# Patient Record
Sex: FEMALE | Race: WHITE | NOT HISPANIC OR LATINO | Employment: OTHER | URBAN - METROPOLITAN AREA
[De-identification: names, ages, dates, MRNs, and addresses within clinical notes are randomized per-mention and may not be internally consistent; named-entity substitution may affect disease eponyms.]

---

## 2017-08-01 ENCOUNTER — ALLSCRIPTS OFFICE VISIT (OUTPATIENT)
Dept: OTHER | Facility: OTHER | Age: 77
End: 2017-08-01

## 2017-08-01 DIAGNOSIS — I48.0 PAROXYSMAL ATRIAL FIBRILLATION (HCC): ICD-10-CM

## 2017-08-03 ENCOUNTER — HOSPITAL ENCOUNTER (OUTPATIENT)
Dept: NON INVASIVE DIAGNOSTICS | Facility: HOSPITAL | Age: 77
Discharge: HOME | End: 2017-08-03
Payer: COMMERCIAL

## 2017-08-03 DIAGNOSIS — I48.0 PAROXYSMAL ATRIAL FIBRILLATION (HCC): ICD-10-CM

## 2017-08-03 PROCEDURE — 93225 XTRNL ECG REC<48 HRS REC: CPT

## 2017-08-03 PROCEDURE — 93226 XTRNL ECG REC<48 HR SCAN A/R: CPT

## 2017-09-08 ENCOUNTER — HOSPITAL ENCOUNTER (OUTPATIENT)
Dept: RADIOLOGY | Age: 77
Discharge: HOME | End: 2017-09-08
Payer: COMMERCIAL

## 2017-09-08 DIAGNOSIS — Z12.31 ENCOUNTER FOR SCREENING MAMMOGRAM FOR MALIGNANT NEOPLASM OF BREAST: ICD-10-CM

## 2017-09-08 PROCEDURE — G0202 SCR MAMMO BI INCL CAD: HCPCS

## 2017-09-10 ENCOUNTER — GENERIC CONVERSION - ENCOUNTER (OUTPATIENT)
Dept: OTHER | Facility: OTHER | Age: 77
End: 2017-09-10

## 2017-10-24 ENCOUNTER — GENERIC CONVERSION - ENCOUNTER (OUTPATIENT)
Dept: OTHER | Facility: OTHER | Age: 77
End: 2017-10-24

## 2017-10-24 DIAGNOSIS — E03.9 HYPOTHYROIDISM: ICD-10-CM

## 2017-10-24 DIAGNOSIS — E87.6 HYPOKALEMIA: ICD-10-CM

## 2017-10-24 DIAGNOSIS — I10 ESSENTIAL (PRIMARY) HYPERTENSION: ICD-10-CM

## 2017-11-01 ENCOUNTER — LAB CONVERSION - ENCOUNTER (OUTPATIENT)
Dept: OTHER | Facility: OTHER | Age: 77
End: 2017-11-01

## 2017-11-01 LAB
A/G RATIO (HISTORICAL): 1.4 (CALC) (ref 1–2.5)
ALBUMIN SERPL BCP-MCNC: 4 G/DL (ref 3.6–5.1)
ALP SERPL-CCNC: 79 U/L (ref 33–130)
ALT SERPL W P-5'-P-CCNC: 13 U/L (ref 6–29)
AST SERPL W P-5'-P-CCNC: 19 U/L (ref 10–35)
BILIRUB SERPL-MCNC: 0.6 MG/DL (ref 0.2–1.2)
BUN SERPL-MCNC: 13 MG/DL (ref 7–25)
BUN/CREA RATIO (HISTORICAL): NORMAL (CALC) (ref 6–22)
CALCIUM SERPL-MCNC: 9.2 MG/DL (ref 8.6–10.4)
CHLORIDE SERPL-SCNC: 104 MMOL/L (ref 98–110)
CHOLEST SERPL-MCNC: 173 MG/DL
CHOLEST/HDLC SERPL: 2.7 (CALC)
CO2 SERPL-SCNC: 29 MMOL/L (ref 20–31)
CREAT SERPL-MCNC: 0.83 MG/DL (ref 0.6–0.93)
EGFR AFRICAN AMERICAN (HISTORICAL): 78 ML/MIN/1.73M2
EGFR-AMERICAN CALC (HISTORICAL): 67 ML/MIN/1.73M2
GAMMA GLOBULIN (HISTORICAL): 2.8 G/DL (CALC) (ref 1.9–3.7)
GLUCOSE (HISTORICAL): 97 MG/DL (ref 65–99)
HDLC SERPL-MCNC: 65 MG/DL
LDL CHOLESTEROL (HISTORICAL): 91 MG/DL (CALC)
NON-HDL-CHOL (CHOL-HDL) (HISTORICAL): 108 MG/DL (CALC)
POTASSIUM SERPL-SCNC: 4 MMOL/L (ref 3.5–5.3)
SODIUM SERPL-SCNC: 139 MMOL/L (ref 135–146)
TOTAL PROTEIN (HISTORICAL): 6.8 G/DL (ref 6.1–8.1)
TRIGL SERPL-MCNC: 80 MG/DL
TSH SERPL DL<=0.05 MIU/L-ACNC: 1.86 MIU/L (ref 0.4–4.5)

## 2017-11-02 ENCOUNTER — ALLSCRIPTS OFFICE VISIT (OUTPATIENT)
Dept: OTHER | Facility: OTHER | Age: 77
End: 2017-11-02

## 2017-12-08 ENCOUNTER — ALLSCRIPTS OFFICE VISIT (OUTPATIENT)
Dept: OTHER | Facility: OTHER | Age: 77
End: 2017-12-08

## 2017-12-15 NOTE — PROGRESS NOTES
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  It was explained that this will get better with time, but there are some options to try to speed up recovery  These include medrol dose pack vs neurontin  Side effects of both medications discussed  At this time, patient will just watch and wait as her symptoms have already improved  If she is interested in either of her medications, she can call our office and we'll send it to her pharmacy  Chief Complaint  1  Arm Pain    History of Present Illness  HPI: Patient is a pleasant 68-year-old female who presents here today with complaints of left forearm pain that radiates down into her hand  She states she was getting blood taken back in the beginning of November and during that time felt a sharp severe pain  Since then she continues to have discomfort primarily on the volar aspect of her forearm that radiates down into the palm and into the thumb  Patient denies numbness or tingling  She denies any weakness  The past medical history, surgical history, family history, social history, medications, allergies, and review of systems have been read and reviewed on the chart and have been updated  Review of Systems was recorded in the office today on a separate evaluation sheet and is listed below  Review of Systems   Constitutional: No fever, no chills, feels well, no tiredness, no recent weight gain or loss  Eyes: No complaints of eyesight problems, no red eyes  ENT: no loss of hearing, no nosebleeds, no sore throat  Cardiovascular: No complaints of chest pain, no palpitations, no leg claudication or lower extremity edema  Respiratory: no compliants of shortness of breath, no wheezing, no cough  Gastrointestinal: no complaints of abdominal pain, no constipation, no nausea or diarrhea, no vomiting, no bloody stools  Genitourinary: no complaints of dysuria, no incontinence  Musculoskeletal: as noted in HPI  Integumentary: no complaints of skin rash or lesion, no itching or dry skin, no skin wounds  Neurological: no complaints of headache, no confusion, no numbness or tingling, no dizziness  Endocrine: No complaints of muscle weakness, no feelings of weakness, no frequent urination, no excessive thirst   Psychiatric: No suicidal thoughts, no anxiety, no feelings of depression  Active Problems  1  Acute bronchitis (466 0) (J20 9)   2  Advance directive discussed with patient (V65 49) (Z71 89)   3  Ankle pain, unspecified laterality   4  Atrial fibrillation (427 31) (I48 91)   5  Benign essential hypertension (401 1) (I10)   6  Dizziness (780 4) (R42)   7  Dyspnea on exertion (786 09) (R06 09)   8  Encounter for routine pelvic examination (V72 31) (Z01 419)   9  Encounter for screening mammogram for malignant neoplasm of breast (V76 12) (Z12 31)   10  Ganglion (727 43) (M67 40)   11  Group B streptococcal infection (041 02) (A49 1)   12  History of food allergy (V15 05) (Z91 018)   13  Hypokalemia (276 8) (E87 6)   14  Hypothyroidism (244 9) (E03 9)   15  Jaw pain (784 92) (R68 84)   16  Medicare annual wellness visit, subsequent (V70 0) (Z00 00)   17  Meniere's disease (386 00) (H81 09)   18  Meralgia paresthetica (355 1) (G57 10)   19  Need for chickenpox vaccination (V05 4) (Z23)   20  Need for prophylactic vaccination and inoculation against influenza (V04 81) (Z23)   21  Need for vaccination with 13-polyvalent pneumococcal conjugate vaccine (V03 82) (Z23)   22  Paroxysmal atrial fibrillation (427 31) (I48 0)   23  Piriformis syndrome, unspecified laterality (355 0) (G57 00)   24   Preventive Medicine Estab Patient Checkup Adult Over 64 (V70 0)   25  Racing heart beat (785 0) (R00 0)   26  Screening for genitourinary condition (V81 6) (Z13 89)   27  Screening for neurological condition (V80 09) (Z13 89)   28  Screening for osteoporosis (V82 81) (Z13 820)   29  Thickened nails (703 8) (L60 2)   30  Vaginal Pap smear (V76 47) (Z12 72)   31  Vaginitis (616 10) (N76 0)    Past Medical History   · History of hypokalemia (V12 29) (Z86 39)   · Need for chickenpox vaccination (V05 4) (Z23)   · Need for prophylactic vaccination and inoculation against influenza (V04 81) (Z23)    Surgical History   · History of Hysterectomy   · Preventive Medicine Estab Patient Checkup Adult Over 59 (V70 0)    Family History   · Family history of Dementia   · Family history of Colon Cancer (V16 0)   · Family history of Family Health Status Of Father -    · Family history of Family Health Status Of Mother -     Social History   · Denied: History of Drug Use   · Former smoker (V15 82) (Q71 704)   · Never Drank Alcohol    Current Meds   1  Aspirin Low Dose 81 MG TABS; TAKE 1 TABLET DAILY; Therapy: 19XBK2528 to (Evaluate:05Arv4949)  Requested for: 50Qby2784 Recorded   2  Caltrate 600+D TABS; Take 1 tablet 3 x wk; Therapy: (Recorded:43Zud5224) to Recorded   3  Centrum Silver Ultra Womens Oral Tablet; TAKE 1/2 TABLET IN AM AND 1/2 IN PM; Therapy: (Recorded:85Ggs1307) to Recorded   4  Claritin 10 MG Oral Capsule; TAKE CAPSULE Daily; Therapy: (Recorded:01Nbp4602) to Recorded   5  Clobetasol Propionate 0 05 % External Cream; PRN; Therapy: 38JCD1742 to (Last Rx:2017)  Requested for: 2017 Ordered   6  Fish Oil CAPS; Take 1 cap 3 x wk; Therapy: (Recorded:16Ufe1532) to Recorded   7  Glucosamine TABS; Take 1 tablet 3 x wk; Therapy: (Recorded:57Aru2749) to Recorded   8  Levothyroxine Sodium 88 MCG Oral Tablet; take 1 tablet by mouth every day; Therapy: 97Zbc3071 to (Evaluate:72Ocw6669)  Requested for: 10Qms7200; Last Rx:53Zvi5544 Ordered   9  Metoprolol Tartrate 25 MG Oral Tablet; take 1 tablet once daily; Therapy: 03CAH4101 to (Evaluate:31Mar2018)  Requested for: 05Apr2017; Last Rx:05Apr2017 Ordered   10  Triamterene-HCTZ 37 5-25 MG Oral Capsule; TAKE CAPSULE  PRN; Therapy: (Ti Presley) to  Requested for: 78Udw8531 Recorded   11  Vitamin C 500 MG Oral Capsule; TAKE 1 CAPSULE 3 x wk; Therapy: (Recorded:52Lck0792) to Recorded   12  Vitamin D3 1000 UNIT Oral Capsule; TAKE CAPSULE 3 x wk; Therapy: (Recorded:33Tgy2879) to Recorded    Allergies  1  Advair Diskus AEPB   2  Penicillins   3  Latex Gloves MISC  4  House Dust   5  Mold   6  No Known Food Allergies    Vitals  Signs   Heart Rate: 69  Systolic: 822  Diastolic: 71  Height: 5 ft 5 in  Weight: 192 lb   BMI Calculated: 31 95  BSA Calculated: 1 94    Physical Exam     General: No acute distress, age-appropriate  Neck: Supple, trachea midline  HEENT: Normocephalic atraumatic, mucous membranes are moist, sclera are nonicteric  Cardiovascular: No discernable arrhythmia  Respiratory: Breathing is even and unlabored, no stridor or audible wheezing  Psychiatric: Awake alert and oriented x3, normal mood and affect  Abdomen: Without rebound or guarding  Left Basic: (Pt's FPL, APB and extensors intact, full strength  )  Left Carpal Tunnel: FDS Flexion Test and Normal APB Strength, but No AIN Weakness, Negative Pronator Stress Test, Negative Tinelâs, No Durkinâs Compression, Negative Phalenâs and No Thenar Atrophy  Carpal Tunnel Examination-  Left Cubital Tunnel: No Tinelâs at Elbow and No Tinelâs at wrist  Carpal Tunnel Examination-   Skin: was evaluated and demonstrated no masses, no abrasions, no erythema, no effusion, no edema, no fluctuance, no induration, no laceration, no ulceration, no lymphadenopathy  Left Upper Neurovascular: Median nerve has abnormal motor strength  Ulnar nerve has normal motor strength  Radial nerve has normal motor strength   Median nerve has abnormal sensation  Ulnar nerve had normal sensation  Radial nerve has normal sensation  Ulnar artery has a normal pulse  Radial artery has a normal pulse  Future Appointments    Date/Time Provider Specialty Site   11/09/2018 01:00 PM Jennifer Mazariegos DO Internal Medicine [facility]   03/14/2018 09:30 AM SHREE Lopez   Orthopedic Surgery [facility]     Signatures   Electronically signed by : Dionna Cuevas [facility]; Dec  8 2017 12:27PM EST                       (Author)    Electronically signed by : SHREE Calderón ; Dec 13 2017  2:05PM EST                       (Author)

## 2018-01-10 NOTE — RESULT NOTES
Message   Notify the patient normal TSH follow up as scheduled        Verified Results  (1) TSH [id number] 07:26AM Linda Number     Test Name Result Flag Reference   TSH 2 060 uIU/mL  0 358-3 740   - Patient Instructions: This is a fasting blood test  Water,black tea or black  coffee only after 9:00pm the night before test Drink 2 glasses of water the morning of test   Patients undergoing fluorescein dye angiography may retain small amounts of fluorescein in the body for 48-72 hours post procedure  Samples containing fluorescein can produce falsely depressed TSH values  If the patient had this procedure,a specimen should be resubmitted post fluorescein clearance            The recommended reference ranges for TSH during pregnancy are as follows:  First trimester 0 1 to 2 5 uIU/mL  Second trimester  0 2 to 3 0 uIU/mL  Third trimester 0 3 to 3 0 uIU/m       Signatures   Electronically signed by : Westley Jones DO; Dec 21 2016  9:42PM EST                       (Author)

## 2018-01-11 NOTE — RESULT NOTES
Message   Notify the patient the mammogram is negative, see OB/GYN for routine examination and f/u as  scheduled        Verified Results  * [facility] SCREENING BILATERAL W CAD [id number] 06:52AM Emmy Sosa Order Number: [id number]    - Patient Instructions: To schedule this appointment, please contact Central Scheduling at [phone]  Do not wear any perfume, powder, lotion or deodorant on breast or underarm area  Please bring your doctors order, referral (if needed) and insurance information with you on the day of the test  Failure to bring this information may result in this test being rescheduled  Arrive 15 minutes prior to your appointment time to register  On the day of your test, please bring any prior mammogram or breast studies with you that were not performed at a [facility]  Failure to bring prior exams may result in your test needing to be rescheduled   Order Number: [id number]    - Patient Instructions: To schedule this appointment, please contact Central Scheduling at [phone]  Do not wear any perfume, powder, lotion or deodorant on breast or underarm area  Please bring your doctors order, referral (if needed) and insurance information with you on the day of the test  Failure to bring this information may result in this test being rescheduled  Arrive 15 minutes prior to your appointment time to register  On the day of your test, please bring any prior mammogram or breast studies with you that were not performed at a [facility]  Failure to bring prior exams may result in your test needing to be rescheduled   Order Number: [id number]    - Patient Instructions: To schedule this appointment, please contact Central Scheduling at [phone]  Do not wear any perfume, powder, lotion or deodorant on breast or underarm area         Please bring your doctors order, referral (if needed) and insurance information with you on the day of the test  Failure to bring this information may result in this test being rescheduled  Arrive 15 minutes prior to your appointment time to register  On the day of your test, please bring any prior mammogram or breast studies with you that were not performed at a [facility]  Failure to bring prior exams may result in your test needing to be rescheduled  Test Name Result Flag Reference   MAMMO SCREENING BILATERAL W CAD (Report)     Patient History:   Family history of colorectal cancer in father at age 80  Patient is a former smoker, and smoked for 15 years  Patient's    BMI is 30 8  Reason for exam: screening (asymptomatic)  Mammo Screening Bilateral W CAD: September 2, 2016 - Check In #:    [de-identified]   Bilateral CC and MLO view(s) were taken  Technologist: Alinda Hamman, RT(R)(M)   Prior study comparison: August 28, 2015, digital bilateral    screening mammogram performed at [facility]  August 22, 2014, digital bilateral screening mammogram performed    at [address] [place] [address]  August 16, 2013, digital    bilateral screening mammogram performed at /[facility]  August 14, 2012, digital bilateral screening mammogram    performed at [address] [place] [address]  June 30, 2011, digital   bilateral screening mammogram performed at /[facility]  The breast tissue is almost entirely fat  No new dominant soft    tissue mass, architectural distortion or suspicious    calcifications are noted  The skin and nipple structures are    within normal limits  Benign appearing calcifications are noted  No mammographic evidence of malignancy  No    significant changes when compared with prior studies  ASSESSMENT: BiRad:2 - Benign     Recommendation:   Routine screening mammogram of both breasts in 1 year  A    reminder letter will be scheduled  Analyzed by CAD     8-10% of cancers will be missed on mammography   Management of a palpable abnormality must be based on clinical grounds  Patients   will be notified of their results via letter from our facility  Accredited by [employer] of [employer] and FDA       Transcription Location: TERRIE [address]: [id number]     Risk Value(s):   Tyrer-Cuzick 10 Year: 2 306%, Tyrer-Cuzick Lifetime: 2 306%,    Myriad Table: 1 5%, HANNAH 5 Year: 2 1%, NCI Lifetime: 3 8%   Signed by:   Patricia Davis MD   9/2/16       Signatures   Electronically signed by : Jamel Dill DO; Sep  4 2016 10:41AM EST                       (Author)

## 2018-01-12 NOTE — PROGRESS NOTES
Assessment    1  Encounter for preventive health examination (V70 0) (Z00 00)   2  Hypothyroidism (244 9) (E03 9)    Plan  Advance directive discussed with patient    · We recommend that you create an advance directive ; Status:Complete - Retrospective  By Protocol Authorization;   Done: [id number]  Health Maintenance    · (1) COMPREHENSIVE METABOLIC PANEL; Status:Active; Requested for:02Oct2018;    · (1) HEMOGLOBIN A1C; Status:Active; Requested WOO:[id number]; Health Maintenance, Hypothyroidism    · (1) LIPID PANEL, FASTING; Status:Active; Requested for:02Oct2018;   Hypothyroidism    · (Q) TSH, 3RD GENERATION; Status:Active; Requested FRT:[id number]; Assessment plan 1  Medicare annual wellness examination subsequent completed for patient overall the patient is clinically stable doing very well I have reviewed her laboratories, patient to get the flu shot in the near future patient to call me if she were to get a puncture wound for her tetanus booster  She will see colon rectal for a screening colonoscopy this year  I will be ordering the patient comprehensive metabolic panel, hemoglobin A1c, lipid panel in 1 year  Return to office 12   months  call if any problems     Chief Complaint  The patient presents today for annual Medicare wellness exam      Advance Directives  Advance Directive [facility]:   The patient is not in agreement to receive the Advance Care Planning service    NO - Patient does not have an advance health care directive  History of Present Illness  Welcome to Medicare and Wellness Visits: The patient is being seen for the subsequent annual wellness visit  Medicare Screening and Risk Factors   Hospitalizations: no previous hospitalizations  Once per lifetime medicare screening tests: ECG has not been done and AAA screening US has not yet been done  Medicare Screening Tests Risk Questions   Drug and Alcohol Use: Alcohol concern:   The patient has no concerns about alcohol abuse   She has never used illicit drugs  Diet and Physical Activity: Current diet includes well balanced meals, 2 servings of fruit per day, 3+ servings of vegetables per day, 1 servings of meat per day, 2 servings of whole grains per day, 2 servings of dairy products per day and 2 cups of coffee per day  She exercises 3 times per week  Exercise: strength training 45 minutes per week  Mood Disorder and Cognitive Impairment Screening:   Depression screening  no significant symptoms  She denies feeling down, depressed, or hopeless over the past two weeks  She denies feeling little interest or pleasure in doing things over the past two weeks  Cognitive impairment screening: denies difficulty learning/retaining new information, denies difficulty handling complex tasks, denies difficulty with reasoning, denies difficulty with spatial ability and orientation, denies difficulty with language and denies difficulty with behavior  Functional Ability/Level of Safety: Hearing is slightly decreased  She reports hearing difficulties  She uses a hearing aid  The patient is currently able to do activities of daily living without limitations, able to do instrumental activities of daily living without limitations, able to participate in social activities without limitations and able to drive without limitations  Injury History: no polypharmacy, no alcohol use, no mobility impairment, no antidepressant use, no deconditioning, no postural hypotension, no sedative use, visual impairment, urinary incontinence, no antihypertensive use, no cognitive impairment, up and go test was normal and no previous fall  Home safety risk factors:  no unfamiliar surroundings, no loose rugs, no poor household lighting, no uneven floors, no household clutter, grab bars in the bathroom and handrails on the stairs  Advance Directives: Advance directives: no living will, no durable power of  for health care directives and no advance directives  Co-Managers and Medical Equipment/Suppliers: See Patient Care Team      Patient Care Team    Care Team Member Role Specialty Office Number   Lexus Keenan HOEWLL  Cardiology , 1970 [facility]  Internal Medicine [phone]   Dr Kennedy Mayank optometrist      Christin Arroyoippo  Colon and Rectal Surgery [phone]     Review of Systems    Constitutional: negative  Cardiovascular: negative  Respiratory: negative  Gastrointestinal: negative  Genitourinary: negative, no dysuria, no urinary frequency, no urinary urgency and no flank pain  Active Problems    1  Acute bronchitis (466 0) (J20 9)   2  Ankle pain, unspecified laterality   3  Atrial fibrillation (427 31) (I48 91)   4  Benign essential hypertension (401 1) (I10)   5  Dizziness (780 4) (R42)   6  Dyspnea on exertion (786 09) (R06 09)   7  Encounter for routine pelvic examination (V72 31) (Z01 419)   8  Encounter for screening mammogram for malignant neoplasm of breast (V76 12)   (Z12 31)   9  Ganglion (727 43) (M67 40)   10  Group B streptococcal infection (041 02) (A49 1)   11  History of food allergy (V15 05) (Z91 018)   12  Hypokalemia (276 8) (E87 6)   13  Hypothyroidism (244 9) (E03 9)   14  Jaw pain (784 92) (R68 84)   15  Medicare annual wellness visit, subsequent (V70 0) (Z00 00)   16  Meniere's disease (386 00) (H81 09)   17  Meralgia paresthetica (355 1) (G57 10)   18  Need for chickenpox vaccination (V05 4) (Z23)   19  Need for prophylactic vaccination and inoculation against influenza (V04 81) (Z23)   20  Need for vaccination with 13-polyvalent pneumococcal conjugate vaccine (V03 82) (Z23)   21  Paroxysmal atrial fibrillation (427 31) (I48 0)   22  Piriformis syndrome, unspecified laterality (355 0) (G57 00)   23  Preventive Medicine Estab Patient Checkup Adult Over 64 (V70 0)   24  Racing heart beat (785 0) (R00 0)   25  Screening for genitourinary condition (V81 6) (Z13 89)   26  Screening for neurological condition (V80 09) (Z13 89)   27  Screening for osteoporosis (V82 81) (Z13 820)   28  Thickened nails (703 8) (L60 2)   29  Vaginal Pap smear (V76 47) (Z12 72)   30  Vaginitis (616 10) (N76 0)    Past Medical History    · History of hypokalemia (V12 29) (Z86 39)   · Need for chickenpox vaccination (V05 4) (Z23)   · Need for prophylactic vaccination and inoculation against influenza (V04 81) (Z23)    The active problems and past medical history were reviewed and updated today  Surgical History    · History of Hysterectomy   · Preventive Medicine Estab Patient Checkup Adult Over 59 (V70 0)    The surgical history was reviewed and updated today  Family History  Mother    · Family history of Dementia  Father    · Family history of Colon Cancer (V16 0)  Family History    · Family history of Family Health Status Of Father -    · Family history of Family Health Status Of Mother -     The family history was reviewed and updated today  Social History    · Denied: History of Drug Use   · Former smoker ( 80) (O01 317)   · Never Drank Alcohol  The social history was reviewed and updated today  The social history was reviewed and is unchanged  Current Meds   1  Aspirin Low Dose 81 MG TABS; TAKE 1 TABLET DAILY; Therapy: 93FOF8474 to (Evaluate:98Gqg0599)  Requested for: 2015 Recorded   2  Caltrate 600+D TABS; Take 1 tablet 3 x wk; Therapy: (Recorded:46Csx3346) to Recorded   3  Centrum Silver Ultra Womens Oral Tablet; TAKE 1/2 TABLET IN AM AND 1/2 IN PM;   Therapy: (Recorded:93Tfa3585) to Recorded   4  Claritin 10 MG Oral Capsule; TAKE CAPSULE Daily; Therapy: (Recorded:59Exo8276) to Recorded   5  Clobetasol Propionate 0 05 % External Cream; PRN; Therapy: 79CQG0439 to (Last Rx:2017)  Requested for: 2017 Ordered   6  Fish Oil CAPS; Take 1 cap 3 x wk; Therapy: (Recorded:26Nis5303) to Recorded   7   Glucosamine TABS; Take 1 tablet 3 x wk; Therapy: (Recorded:18May2015) to Recorded   8  Levothyroxine Sodium 88 MCG Oral Tablet; take 1 tablet by mouth every day; Therapy: 98Eyq5734 to (Evaluate:03Dfb0094)  Requested for: 02Aug2017; Last   Rx:02Aug2017 Ordered   9  Metoprolol Tartrate 25 MG Oral Tablet; take 1 tablet once daily; Therapy: 90CNZ4613 to (Evaluate:31Mar2018)  Requested for: 05Apr2017; Last   Rx:05Apr2017 Ordered   10  Triamterene-HCTZ 37 5-25 MG Oral Capsule; TAKE CAPSULE  PRN; Therapy: (Theo Money) to  Requested for: 02Aug2016 Recorded   11  Vitamin C 500 MG Oral Capsule; TAKE 1 CAPSULE 3 x wk; Therapy: (Recorded:18May2015) to Recorded   12  Vitamin D3 1000 UNIT Oral Capsule; TAKE CAPSULE 3 x wk; Therapy: (Recorded:18May2015) to Recorded    The medication list was reviewed and updated today  Allergies    1  Advair Diskus AEPB   2  Penicillins    3  House Dust   4  Mold   5  No Known Food Allergies    Immunizations   1 2 3    Influenza  26-Nov-2012 23-Sep-2015 23-Sep-2016    PCV  18-Aug-2015      PPSV  22-Jul-2003      Td/DT  22-Jul-2005      Zoster  25-Feb-2013       Vitals  Signs    Temperature: 98 F  Heart Rate: 63  Respiration: 16  Systolic: 235  Diastolic: 72  Height: 5 ft 5 in  O2 Saturation: 94    Physical Exam    Constitutional   General appearance: No acute distress, well appearing and well nourished  Head and Face   Head and face: Normal     Eyes   Conjunctiva and lids: No swelling, erythema or discharge  Pupils and irises: Equal, round, reactive to light  Ears, Nose, Mouth, and Throat   External inspection of ears and nose: Normal     Otoscopic examination: Tympanic membranes translucent with normal light reflex  Canals patent without erythema  Hearing: Normal     Nasal mucosa, septum, and turbinates: Normal without edema or erythema  Lips, teeth, and gums: Normal, good dentition  Oropharynx: Normal with no erythema, edema, exudate or lesions      Neck   Neck: Supple, symmetric, trachea midline, no masses  Pulmonary   Respiratory effort: No increased work of breathing or signs of respiratory distress  Auscultation of lungs: Clear to auscultation  Cardiovascular   Auscultation of heart: Normal rate and rhythm, normal S1 and S2, no murmurs  Pedal pulses: 2+ bilaterally  Examination of extremities for edema and/or varicosities: Normal     Abdomen   Abdomen: Non-tender, no masses  Liver and spleen: No hepatomegaly or splenomegaly  Lymphatic   Palpation of lymph nodes in neck: No lymphadenopathy  Psychiatric   Mood and affect: Normal        Results/Data  PHQ-9 Adult Depression Screening 81JLE4455 02:10PM User, MiTÃº     Test Name Result Flag Reference   PHQ-9 Adult Depression Score 3     Over the last two weeks, how often have you been bothered by any of the following problems? Little interest or pleasure in doing things: Not at all - 0  Feeling down, depressed, or hopeless: Not at all - 0  Trouble falling or staying asleep, or sleeping too much: Not at all - 0  Feeling tired or having little energy: Several days - 1  Poor appetite or over eating: More than half the days - 2  Feeling bad about yourself - or that you are a failure or have let yourself or your family down: Not at all - 0  Trouble concentrating on things, such as reading the newspaper or watching television: Not at all - 0  Moving or speaking so slowly that other people could have noticed  Or the opposite -  being so fidgety or restless that you have been moving around a lot more than usual: Not at all - 0  Thoughts that you would be better off dead, or of hurting yourself in some way: Not at all - 0   PHQ-9 Adult Depression Screening Negative     PHQ-9 Difficulty Level Not difficult at all     PHQ-9 Severity Minimal Depression         Health Management  Health Maintenance   Medicare Annual Wellness Visit; every 1 year; Last 29Sep2015; Next Due: 14BTS2926;   Overdue    Signatures   Electronically signed by : Belle Thornton DO; Nov 2 2017  3:05PM EST                       (Author)

## 2018-01-13 NOTE — RESULT NOTES
Message   notify the patient normal labs except a mild elevation of the blood sugar please have the pt  reduce carbohydrates and sweets in the diet follow up as scheduled        Verified Results  (1) COMPREHENSIVE METABOLIC PANEL [id number] 72:49CC [facility] [employer] Order Number: [id number]     Test Name Result Flag Reference   GLUCOSE,RANDM 101 mg/dL     If the patient is fasting, the ADA then defines impaired fasting glucose as > 100 mg/dL and diabetes as > or equal to 123 mg/dL  SODIUM 142 mmol/L  136-145   POTASSIUM 3 9 mmol/L  3 5-5 3   CHLORIDE 107 mmol/L  100-108   CARBON DIOXIDE 27 mmol/L  21-32   ANION GAP (CALC) 8 mmol/L  4-13   BLOOD UREA NITROGEN 13 mg/dL  5-25   CREATININE 0 70 mg/dL  0 60-1 30   Standardized to IDMS reference method   CALCIUM 9 0 mg/dL  8 3-10 1   BILI, TOTAL 0 53 mg/dL  0 20-1 00   ALK PHOSPHATAS 88 U/L     ALT (SGPT) 22 U/L  12-78   AST(SGOT) 18 U/L  5-45   ALBUMIN 3 3 g/dL L 3 5-5 0   TOTAL PROTEIN 7 1 g/dL  6 4-8 2   eGFR Non-African American      >60 0 ml/min/1 73sq m   - Patient Instructions: This is a fasting blood test  Water,black tea or black  coffee only after 9:00pm the night before test Drink 2 glasses of water the morning of test   National Kidney Disease Education Program recommendations are as follows:  GFR calculation is accurate only with a steady state creatinine  Chronic Kidney disease less than 60 ml/min/1 73 sq  meters  Kidney failure less than 15 ml/min/1 73 sq  meters  (1) LIPID PANEL, FASTING 20Lan5091 07:26AM [facility] Order Number: [id number]     Test Name Result Flag Reference   CHOLESTEROL 167 mg/dL     HDL,DIRECT 64 mg/dL H 40-60   Specimen collection should occur prior to Metamizole administration due to the potential for falsely depressed results  LDL CHOLESTEROL CALCULATED 84 mg/dL  0-100   - Patient Instructions:  This is a fasting blood test  Water,black tea or black  coffee only after 9:00pm the night before test   Drink 2 glasses of water the morning of test     - Patient Instructions: This is a fasting blood test  Water,black tea or black  coffee only after 9:00pm the night before test Drink 2 glasses of water the morning of test   Triglyceride:         Normal              <150 mg/dl       Borderline High    150-199 mg/dl       High               200-499 mg/dl       Very High          >499 mg/dl  Cholesterol:         Desirable        <200 mg/dl      Borderline High  200-239 mg/dl      High             >239 mg/dl  HDL Cholesterol:        High    >59 mg/dL      Low     <41 mg/dL  LDL CALCULATED:    This screening LDL is a calculated result  It does not have the accuracy of the Direct Measured LDL in the monitoring of patients with hyperlipidemia and/or statin therapy  Direct Measure LDL (JBN608) must be ordered separately in these patients  TRIGLYCERIDES 97 mg/dL  <=150   Specimen collection should occur prior to N-Acetylcysteine or Metamizole administration due to the potential for falsely depressed results         Signatures   Electronically signed by : Rajinder Ramires DO; Dec 20 2016  8:12PM EST                       (Author)

## 2018-01-14 VITALS
TEMPERATURE: 98 F | SYSTOLIC BLOOD PRESSURE: 108 MMHG | HEART RATE: 63 BPM | HEIGHT: 65 IN | OXYGEN SATURATION: 94 % | DIASTOLIC BLOOD PRESSURE: 72 MMHG | RESPIRATION RATE: 16 BRPM

## 2018-01-14 NOTE — RESULT NOTES
Message   Notify the patient mammogram no evidence of malignancy, see OB/GYN for routine examination and follow up as scheduled        Verified Results  * [facility] SCREENING BILATERAL W CAD [id number] 06:50AM Fabiana Rodriguez Order Number: [id number]    - Patient Instructions: To schedule this appointment, please contact Central Scheduling at [phone]  Do not wear any perfume, powder, lotion or deodorant on breast or underarm area  Please bring your doctors order, referral (if needed) and insurance information with you on the day of the test  Failure to bring this information may result in this test being rescheduled  Arrive 15 minutes prior to your appointment time to register  On the day of your test, please bring any prior mammogram or breast studies with you that were not performed at a [facility]  Failure to bring prior exams may result in your test needing to be rescheduled   Order Number: [id number]    - Patient Instructions: To schedule this appointment, please contact Central Scheduling at [phone]  Do not wear any perfume, powder, lotion or deodorant on breast or underarm area  Please bring your doctors order, referral (if needed) and insurance information with you on the day of the test  Failure to bring this information may result in this test being rescheduled  Arrive 15 minutes prior to your appointment time to register  On the day of your test, please bring any prior mammogram or breast studies with you that were not performed at a [facility]  Failure to bring prior exams may result in your test needing to be rescheduled   Order Number: [id number]    - Patient Instructions: To schedule this appointment, please contact Central Scheduling at [phone]  Do not wear any perfume, powder, lotion or deodorant on breast or underarm area         Please bring your doctors order, referral (if needed) and insurance information with you on the day of the test  Failure to bring this information may result in this test being rescheduled  Arrive 15 minutes prior to your appointment time to register  On the day of your test, please bring any prior mammogram or breast studies with you that were not performed at a [facility]  Failure to bring prior exams may result in your test needing to be rescheduled  Test Name Result Flag Reference   MAMMO SCREENING BILATERAL W CAD (Report)     Patient History:   Family history of colorectal cancer at age 80 in father  Took hormonal contraceptives for 1 month  Patient is a former smoker, and smoked for 15 years  Patient's    BMI is 30 8  Reason for exam: screening, asymptomatic  Mammo Screening Bilateral W CAD: September 8, 2017 - Check In #:    [de-identified]   Bilateral CC and MLO view(s) were taken  Technologist: RT Lisa(R)(M)   Prior study comparison: September 2, 2016, mammo screening    bilateral W CAD performed at [facility]  August 28, 2015, [facility]  August 22, 2014, digital bilateral    screening mammogram performed at [facility]  August 16, 2013, digital bilateral screening mammogram performed    at [facility]  August 14, 2012, digital    bilateral screening mammogram performed at [facility]  There are scattered fibroglandular densities  No dominant soft tissue mass, architectural distortion or    suspicious calcifications are noted in either breast   The skin    and nipple structures are within normal limits  Scattered benign   appearing calcifications are noted  No evidence of malignancy  No significant changes when compared with prior studies  ACR BI-RADSï¾® Assessments: BiRad:2 - Benign     Recommendation:   Routine screening mammogram of both breasts in 1 year     Analyzed by CAD     The patient is scheduled in a reminder system for screening    mammography  8-10% of cancers will be missed on mammography  Management of a    palpable abnormality must be based on clinical grounds  Patients   will be notified of their results via letter from our facility  Accredited by [employer] of Radiology and FDA       Transcription Location: TERRIE [address]: [id number]     Risk Value(s):   Tyrer-Cuzick 10 Year: 2 000%, Tyrer-Cuzick Lifetime: 2 000%,    Myriad Table: 1 5%, HANNAH 5 Year: 2 1%, NCI Lifetime: 3 4%   Signed by:   Ronny Adorno MD   9/8/17       Signatures   Electronically signed by : Yamilex Ascencio DO; Sep 10 2017  8:43AM EST                       (Author)

## 2018-01-15 VITALS
SYSTOLIC BLOOD PRESSURE: 122 MMHG | BODY MASS INDEX: 31.49 KG/M2 | WEIGHT: 189 LBS | DIASTOLIC BLOOD PRESSURE: 76 MMHG | HEART RATE: 66 BPM | HEIGHT: 65 IN

## 2018-01-18 ENCOUNTER — HOSPITAL ENCOUNTER (EMERGENCY)
Facility: HOSPITAL | Age: 78
Discharge: HOME | End: 2018-01-18
Attending: EMERGENCY MEDICINE | Admitting: EMERGENCY MEDICINE
Payer: COMMERCIAL

## 2018-01-18 ENCOUNTER — APPOINTMENT (EMERGENCY)
Dept: CT IMAGING | Facility: HOSPITAL | Age: 78
End: 2018-01-18
Payer: COMMERCIAL

## 2018-01-18 VITALS
OXYGEN SATURATION: 98 % | HEART RATE: 59 BPM | SYSTOLIC BLOOD PRESSURE: 150 MMHG | DIASTOLIC BLOOD PRESSURE: 69 MMHG | RESPIRATION RATE: 16 BRPM | TEMPERATURE: 98.2 F

## 2018-01-18 DIAGNOSIS — M51.36 DEGENERATIVE DISC DISEASE, LUMBAR: ICD-10-CM

## 2018-01-18 DIAGNOSIS — M54.50 LOW BACK PAIN: Primary | ICD-10-CM

## 2018-01-18 LAB
ALBUMIN SERPL BCP-MCNC: 4.1 G/DL (ref 3.5–5)
ALP SERPL-CCNC: 79 U/L (ref 46–116)
ALT SERPL W P-5'-P-CCNC: 30 U/L (ref 12–78)
ANION GAP SERPL CALCULATED.3IONS-SCNC: 8 MMOL/L (ref 4–13)
AST SERPL W P-5'-P-CCNC: 20 U/L (ref 5–45)
BACTERIA UR QL AUTO: ABNORMAL /HPF
BASOPHILS # BLD AUTO: 0.02 THOUSANDS/ΜL (ref 0–0.1)
BASOPHILS NFR BLD AUTO: 0 % (ref 0–1)
BILIRUB SERPL-MCNC: 0.4 MG/DL (ref 0.2–1)
BILIRUB UR QL STRIP: NEGATIVE
BUN SERPL-MCNC: 17 MG/DL (ref 5–25)
CALCIUM SERPL-MCNC: 9.6 MG/DL (ref 8.3–10.1)
CHLORIDE SERPL-SCNC: 104 MMOL/L (ref 100–108)
CLARITY UR: CLEAR
CO2 SERPL-SCNC: 29 MMOL/L (ref 21–32)
COLOR UR: YELLOW
CREAT SERPL-MCNC: 0.81 MG/DL (ref 0.6–1.3)
EOSINOPHIL # BLD AUTO: 0.02 THOUSAND/ΜL (ref 0–0.61)
EOSINOPHIL NFR BLD AUTO: 0 % (ref 0–6)
ERYTHROCYTE [DISTWIDTH] IN BLOOD BY AUTOMATED COUNT: 13.5 % (ref 11.6–15.1)
GFR SERPL CREATININE-BSD FRML MDRD: 69 ML/MIN/1.73SQ M
GLUCOSE SERPL-MCNC: 98 MG/DL (ref 65–140)
GLUCOSE UR STRIP-MCNC: NEGATIVE MG/DL
HCT VFR BLD AUTO: 40.6 % (ref 34.8–46.1)
HGB BLD-MCNC: 14.4 G/DL (ref 11.5–15.4)
HGB UR QL STRIP.AUTO: NEGATIVE
KETONES UR STRIP-MCNC: NEGATIVE MG/DL
LEUKOCYTE ESTERASE UR QL STRIP: ABNORMAL
LYMPHOCYTES # BLD AUTO: 2.15 THOUSANDS/ΜL (ref 0.6–4.47)
LYMPHOCYTES NFR BLD AUTO: 30 % (ref 14–44)
MCH RBC QN AUTO: 32.6 PG (ref 26.8–34.3)
MCHC RBC AUTO-ENTMCNC: 35.5 G/DL (ref 31.4–37.4)
MCV RBC AUTO: 92 FL (ref 82–98)
MONOCYTES # BLD AUTO: 0.58 THOUSAND/ΜL (ref 0.17–1.22)
MONOCYTES NFR BLD AUTO: 8 % (ref 4–12)
NEUTROPHILS # BLD AUTO: 4.42 THOUSANDS/ΜL (ref 1.85–7.62)
NEUTS SEG NFR BLD AUTO: 62 % (ref 43–75)
NITRITE UR QL STRIP: NEGATIVE
NON-SQ EPI CELLS URNS QL MICRO: ABNORMAL /HPF
PH UR STRIP.AUTO: 6.5 [PH] (ref 4.5–8)
PLATELET # BLD AUTO: 236 THOUSANDS/UL (ref 149–390)
PMV BLD AUTO: 9.9 FL (ref 8.9–12.7)
POTASSIUM SERPL-SCNC: 4.1 MMOL/L (ref 3.5–5.3)
PROT SERPL-MCNC: 7.6 G/DL (ref 6.4–8.2)
PROT UR STRIP-MCNC: NEGATIVE MG/DL
RBC # BLD AUTO: 4.42 MILLION/UL (ref 3.81–5.12)
RBC #/AREA URNS AUTO: ABNORMAL /HPF
SODIUM SERPL-SCNC: 141 MMOL/L (ref 136–145)
SP GR UR STRIP.AUTO: <=1.005 (ref 1–1.03)
UROBILINOGEN UR QL STRIP.AUTO: 0.2 E.U./DL
WBC # BLD AUTO: 7.19 THOUSAND/UL (ref 4.31–10.16)
WBC #/AREA URNS AUTO: ABNORMAL /HPF

## 2018-01-18 PROCEDURE — 96374 THER/PROPH/DIAG INJ IV PUSH: CPT

## 2018-01-18 PROCEDURE — 96376 TX/PRO/DX INJ SAME DRUG ADON: CPT

## 2018-01-18 PROCEDURE — 96375 TX/PRO/DX INJ NEW DRUG ADDON: CPT

## 2018-01-18 PROCEDURE — 36415 COLL VENOUS BLD VENIPUNCTURE: CPT | Performed by: PHYSICIAN ASSISTANT

## 2018-01-18 PROCEDURE — 99284 EMERGENCY DEPT VISIT MOD MDM: CPT

## 2018-01-18 PROCEDURE — 74177 CT ABD & PELVIS W/CONTRAST: CPT

## 2018-01-18 PROCEDURE — 85025 COMPLETE CBC W/AUTO DIFF WBC: CPT | Performed by: PHYSICIAN ASSISTANT

## 2018-01-18 PROCEDURE — 80053 COMPREHEN METABOLIC PANEL: CPT | Performed by: PHYSICIAN ASSISTANT

## 2018-01-18 PROCEDURE — 81001 URINALYSIS AUTO W/SCOPE: CPT | Performed by: PHYSICIAN ASSISTANT

## 2018-01-18 RX ORDER — ACETAMINOPHEN 325 MG/1
975 TABLET ORAL ONCE
Status: COMPLETED | OUTPATIENT
Start: 2018-01-18 | End: 2018-01-18

## 2018-01-18 RX ORDER — MORPHINE SULFATE 15 MG/1
15 TABLET ORAL EVERY 6 HOURS PRN
Qty: 15 TABLET | Refills: 0 | Status: SHIPPED | OUTPATIENT
Start: 2018-01-18 | End: 2018-08-08 | Stop reason: ALTCHOICE

## 2018-01-18 RX ORDER — MORPHINE SULFATE 2 MG/ML
2 INJECTION, SOLUTION INTRAMUSCULAR; INTRAVENOUS ONCE
Status: COMPLETED | OUTPATIENT
Start: 2018-01-18 | End: 2018-01-18

## 2018-01-18 RX ORDER — LIDOCAINE 50 MG/G
1 PATCH TOPICAL ONCE
Status: DISCONTINUED | OUTPATIENT
Start: 2018-01-18 | End: 2018-01-18 | Stop reason: HOSPADM

## 2018-01-18 RX ORDER — DIAZEPAM 5 MG/ML
2.5 INJECTION, SOLUTION INTRAMUSCULAR; INTRAVENOUS ONCE
Status: COMPLETED | OUTPATIENT
Start: 2018-01-18 | End: 2018-01-18

## 2018-01-18 RX ORDER — ONDANSETRON 2 MG/ML
4 INJECTION INTRAMUSCULAR; INTRAVENOUS ONCE
Status: COMPLETED | OUTPATIENT
Start: 2018-01-18 | End: 2018-01-18

## 2018-01-18 RX ADMIN — IOHEXOL 100 ML: 350 INJECTION, SOLUTION INTRAVENOUS at 13:30

## 2018-01-18 RX ADMIN — MORPHINE SULFATE 2 MG: 2 INJECTION, SOLUTION INTRAMUSCULAR; INTRAVENOUS at 14:09

## 2018-01-18 RX ADMIN — LIDOCAINE 1 PATCH: 50 PATCH TOPICAL at 14:41

## 2018-01-18 RX ADMIN — MORPHINE SULFATE 2 MG: 2 INJECTION, SOLUTION INTRAMUSCULAR; INTRAVENOUS at 12:34

## 2018-01-18 RX ADMIN — ACETAMINOPHEN 975 MG: 325 TABLET, FILM COATED ORAL at 14:47

## 2018-01-18 RX ADMIN — DIAZEPAM 2.5 MG: 5 INJECTION, SOLUTION INTRAMUSCULAR; INTRAVENOUS at 12:37

## 2018-01-18 RX ADMIN — ONDANSETRON 4 MG: 2 INJECTION INTRAMUSCULAR; INTRAVENOUS at 12:31

## 2018-01-18 NOTE — DISCHARGE INSTRUCTIONS
Back Pain   WHAT YOU NEED TO KNOW:   Back pain is common  It can be caused by many conditions, such as arthritis or the breakdown of spinal discs  Your risk for back pain is increased by injuries, lack of activity, or repeated bending and twisting  You may feel sore or stiff on one or both sides of your back  The pain may spread to your buttocks or thighs  DISCHARGE INSTRUCTIONS:   Medicines:   · NSAIDs  help decrease swelling and pain  This medicine is available with or without a doctor's order  NSAIDs can cause stomach bleeding or kidney problems in certain people  If you take blood thinner medicine, always ask your healthcare provider if NSAIDs are safe for you  Always read the medicine label and follow directions  · Acetaminophen  decreases pain  It is available without a doctor's order  Ask how much to take and how often to take it  Follow directions  Acetaminophen can cause liver damage if not taken correctly  · Prescription pain medicine  may be given  Ask your healthcare provider how to take this medicine safely  · Take your medicine as directed  Contact your healthcare provider if you think your medicine is not helping or if you have side effects  Tell him or her if you are allergic to any medicine  Keep a list of the medicines, vitamins, and herbs you take  Include the amounts, and when and why you take them  Bring the list or the pill bottles to follow-up visits  Carry your medicine list with you in case of an emergency  Follow up with your healthcare provider in 2 weeks, or as directed:  Write down your questions so you remember to ask them during your visits  How to manage your back pain:   · Apply ice  on your back or affected area for 15 to 20 minutes every hour or as directed  Use an ice pack, or put crushed ice in a plastic bag  Cover it with a towel  Ice helps prevent tissue damage and decreases pain      · Apply heat  on your back or affected area for 20 to 30 minutes every 2 hours for as many days as directed  Heat helps decrease pain and muscle spasms  · Stay active  as much as you can without causing more pain  Bed rest could make your back pain worse  Avoid heavy lifting until your pain is gone  Return to the emergency department if:   · You have pain, numbness, or weakness in one or both legs  · Your pain becomes so severe that you cannot walk  · You cannot control your urine or bowel movements  · You have severe back pain with chest pain  · You have severe back pain, nausea, and vomiting  · You have severe back pain that spreads to your side or genital area  Contact your healthcare provider if:   · You have back pain that does not get better with rest and pain medicine  · You have a fever  · You have pain that worsens when you are on your back or when you rest     · You have pain that worsens when you cough or sneeze  · You lose weight without trying  · You have questions or concerns about your condition or care  © 2017 [facility] Information is for End User's use only and may not be sold, redistributed or otherwise used for commercial purposes  All illustrations and images included in CareNotes® are the copyrighted property of [employer]  or Dinesh Meneses  The above information is an  only  It is not intended as medical advice for individual conditions or treatments  Talk to your doctor, nurse or pharmacist before following any medical regimen to see if it is safe and effective for you

## 2018-01-18 NOTE — ED PROVIDER NOTES
History  Chief Complaint   Patient presents with    Back Pain     c/o bilateral lower back pain/spasms x 1 5 weeks  Pt denies recent trauma, urinary symptoms, or N/V/D at present time  69yo female who presents to ER for evaluation of lower back pain  States over the past week she has had mild pain come and go however last night it significantly worsened  She describes pain as a muscle spasm  Located more on the left side of her back  Denies urinary symptoms  Denies bowel or bladder dysfunction or saddle anesthesia  Pain does not radiate down the legs  She denies chest pain or shortness of breath  Sometimes the spasm will radiate into her abdomen  Patient having great difficulty sleeping last night secondary to pain  Able to walk with difficulty  Pain is worse with any movement at all  Improved with lying still  Patient has heating pas applied with minimal relief  This is the first time she is having this evaluated at the doctor  History provided by:  Patient  Back Pain   Associated symptoms: no chest pain and no fever        None       History reviewed  No pertinent past medical history  Past Surgical History:   Procedure Laterality Date    HYSTERECTOMY         History reviewed  No pertinent family history  I have reviewed and agree with the history as documented  Social History   Substance Use Topics    Smoking status: Never Smoker    Smokeless tobacco: Never Used    Alcohol use No        Review of Systems   Constitutional: Negative for chills and fever  HENT: Negative for congestion  Respiratory: Negative for cough and shortness of breath  Cardiovascular: Negative for chest pain  Gastrointestinal: Negative for diarrhea and vomiting  Musculoskeletal: Positive for back pain  Negative for joint swelling  Skin: Negative for rash and wound  Neurological: Negative for syncope         Physical Exam  ED Triage Vitals [01/18/18 1125]   Temperature Pulse Respirations Blood Pressure SpO2   98 2 °F (36 8 °C) 81 18 (!) 174/80 97 %      Temp Source Heart Rate Source Patient Position - Orthostatic VS BP Location FiO2 (%)   Oral Monitor Standing Left arm --      Pain Score       Worst Possible Pain           Orthostatic Vital Signs  Vitals:    01/18/18 1125 01/18/18 1245 01/18/18 1345   BP: (!) 174/80  146/62   Pulse: 81 (!) 52 (!) 54   Patient Position - Orthostatic VS: Standing  Lying       Physical Exam   Constitutional: She appears well-developed and well-nourished  Eyes: Conjunctivae are normal    Neck: Neck supple  Cardiovascular: Normal rate, regular rhythm and normal heart sounds  Pulmonary/Chest: Effort normal and breath sounds normal    Abdominal: Soft  Bowel sounds are normal  She exhibits no distension  There is no tenderness  There is no CVA tenderness  Musculoskeletal: She exhibits no edema  Cervical back: Normal         Thoracic back: Normal         Lumbar back: She exhibits pain  She exhibits no bony tenderness, no swelling and no deformity  Back:    Negative straight leg raise  No foot drop  Neurological: She is alert  Skin: Skin is warm and dry  No rash noted  Psychiatric: She has a normal mood and affect  Nursing note and vitals reviewed        ED Medications  Medications   lidocaine (LIDODERM) 5 % patch 1 patch (1 patch Transdermal Medication Applied 1/18/18 1441)   morphine injection 2 mg (2 mg Intravenous Given 1/18/18 1234)   ondansetron (ZOFRAN) injection 4 mg (4 mg Intravenous Given 1/18/18 1231)   diazepam (VALIUM) injection 2 5 mg (2 5 mg Intravenous Given 1/18/18 1237)   iohexol (OMNIPAQUE) 350 MG/ML injection (MULTI-DOSE) 100 mL (100 mL Intravenous Given 1/18/18 1330)   morphine injection 2 mg (2 mg Intravenous Given 1/18/18 1409)   acetaminophen (TYLENOL) tablet 975 mg (975 mg Oral Given 1/18/18 1447)       Diagnostic Studies  Results Reviewed     Procedure Component Value Units Date/Time    Urine Microscopic [[id number]] (Abnormal) Collected:  01/18/18 1446    Lab Status:  Final result Specimen:  Urine from Urine, Clean Catch Updated:  01/18/18 1541     RBC, UA None Seen /hpf      WBC, UA 0-1 (A) /hpf      Epithelial Cells None Seen /hpf      Bacteria, UA None Seen /hpf     UA w Reflex to Microscopic w Reflex to Culture [[id number]]  (Abnormal) Collected:  01/18/18 1446    Lab Status:  Final result Specimen:  Urine from Urine, Clean Catch Updated:  01/18/18 1455     Color, UA Yellow     Clarity, UA Clear     Specific Gravity, UA <=1 005     pH, UA 6 5     Leukocytes, UA Trace (A)     Nitrite, UA Negative     Protein, UA Negative mg/dl      Glucose, UA Negative mg/dl      Ketones, UA Negative mg/dl      Urobilinogen, UA 0 2 E U /dl      Bilirubin, UA Negative     Blood, UA Negative    Comprehensive metabolic panel [[id number]] Collected:  01/18/18 1230    Lab Status:  Final result Specimen:  Blood from Arm, Right Updated:  01/18/18 1303     Sodium 141 mmol/L      Potassium 4 1 mmol/L      Chloride 104 mmol/L      CO2 29 mmol/L      Anion Gap 8 mmol/L      BUN 17 mg/dL      Creatinine 0 81 mg/dL      Glucose 98 mg/dL      Calcium 9 6 mg/dL      AST 20 U/L      ALT 30 U/L      Alkaline Phosphatase 79 U/L      Total Protein 7 6 g/dL      Albumin 4 1 g/dL      Total Bilirubin 0 40 mg/dL      eGFR 69 ml/min/1 73sq m     Narrative:         National Kidney Disease Education Program recommendations are as follows:  GFR calculation is accurate only with a steady state creatinine  Chronic Kidney disease less than 60 ml/min/1 73 sq  meters  Kidney failure less than 15 ml/min/1 73 sq  meters      CBC and differential [[id number]]  (Normal) Collected:  01/18/18 1230    Lab Status:  Final result Specimen:  Blood from Arm, Right Updated:  01/18/18 1245     WBC 7 19 Thousand/uL      RBC 4 42 Million/uL      Hemoglobin 14 4 g/dL      Hematocrit 40 6 %      MCV 92 fL      MCH 32 6 pg      MCHC 35 5 g/dL      RDW 13 5 %      MPV 9 9 fL      Platelets 313 Thousands/uL      Neutrophils Relative 62 %      Lymphocytes Relative 30 %      Monocytes Relative 8 %      Eosinophils Relative 0 %      Basophils Relative 0 %      Neutrophils Absolute 4 42 Thousands/µL      Lymphocytes Absolute 2 15 Thousands/µL      Monocytes Absolute 0 58 Thousand/µL      Eosinophils Absolute 0 02 Thousand/µL      Basophils Absolute 0 02 Thousands/µL                  CT recon only lumbar spine   Final Result by Bill Flynn MD (01/18 1411)      Degenerative changes as described  Workstation performed: [id number]         CT abdomen pelvis with contrast   Final Result by Bill Flynn MD (01/18 1405)      No acute intra-abdominal abnormality  Workstation performed: [id number]                    Procedures  Procedures       Phone Contacts  ED Phone Contact    ED Course  ED Course as of Jan 18 1602   Thu Jan 18, 2018   1351 Pain not improved much  MDM  Number of Diagnoses or Management Options  Degenerative disc disease, lumbar:   Low back pain: new and requires workup     Amount and/or Complexity of Data Reviewed  Clinical lab tests: ordered and reviewed  Tests in the radiology section of CPT®: ordered and reviewed    Risk of Complications, Morbidity, and/or Mortality  General comments: Differential diagnosis includes but is not limited to: muslce spasm, DDD, compression fracture, AAA, pyleo, uti, shingles    Patient Progress  Patient progress: improved (Patient able to stand without assistance,  Ambulates slowly and steady      Discussed continuation of supportive therapy at home with tylenol, heating pad and or salonpas patches)    CritCare Time    Disposition  Final diagnoses:   Low back pain   Degenerative disc disease, lumbar     Time reflects when diagnosis was documented in both MDM as applicable and the Disposition within this note     Time User Action Codes Description Comment    1/18/2018  3:59 PM Britany ROSADO Add [M54 5] Low back pain 1/18/2018  4:02 PM Zuhair Liesarah Add [M51 36] Degenerative disc disease, lumbar       ED Disposition     ED Disposition Condition Comment    Discharge  José Roman discharge to home/self care  Condition at discharge: Good        Follow-up Information     Follow up With Specialties Details Why Contact Info Additional [address] Emergency Department Emergency Medicine  If symptoms worsen [address]  [phone] [facility] ED, [address], [place], [place], [address] [facility] and [address] [facility] Radiology In 3 days  [place] [address] [facility]  [phone]  AN [address] [facility], [facility] [address], [place], [place], [address] Please report to Outpatient registration on the 1st floor of the Medical Office building (middle building) to register  Patient's Medications   Discharge Prescriptions    MORPHINE (MSIR) 15 MG TABLET    Take 1 tablet by mouth every 6 (six) hours as needed for severe pain for up to 15 doses Max Daily Amount: 60 mg       Start Date: 1/18/2018 End Date: --       Order Dose: 15 mg       Quantity: 15 tablet    Refills: 0     No discharge procedures on file      ED Provider  Electronically Signed by           Faraz Wilson PA-C  01/18/18 6119

## 2018-01-22 VITALS
WEIGHT: 192 LBS | HEART RATE: 69 BPM | DIASTOLIC BLOOD PRESSURE: 71 MMHG | HEIGHT: 65 IN | BODY MASS INDEX: 31.99 KG/M2 | SYSTOLIC BLOOD PRESSURE: 131 MMHG

## 2018-01-23 NOTE — CONSULTS
Chief Complaint    1  Arm Pain    History of Present Illness  HPI: Patient is a pleasant 66-year-old female who presents here today with complaints of left forearm pain that radiates down into her hand  She states she was getting blood taken back in the beginning of November and during that time felt a sharp severe pain  Since then she continues to have discomfort primarily on the volar aspect of her forearm that radiates down into the palm and into the thumb  Patient denies numbness or tingling  She denies any weakness  The past medical history, surgical history, family history, social history, medications, allergies, and review of systems have been read and reviewed on the chart and have been updated  Review of Systems was recorded in the office today on a separate evaluation sheet and is listed below  Review of Systems  Focused-Female Orthopedics:   Constitutional: No fever, no chills, feels well, no tiredness, no recent weight gain or loss  Eyes: No complaints of eyesight problems, no red eyes  ENT: no loss of hearing, no nosebleeds, no sore throat  Cardiovascular: No complaints of chest pain, no palpitations, no leg claudication or lower extremity edema  Respiratory: no compliants of shortness of breath, no wheezing, no cough  Gastrointestinal: no complaints of abdominal pain, no constipation, no nausea or diarrhea, no vomiting, no bloody stools  Genitourinary: no complaints of dysuria, no incontinence  Musculoskeletal: as noted in HPI  Integumentary: no complaints of skin rash or lesion, no itching or dry skin, no skin wounds  Neurological: no complaints of headache, no confusion, no numbness or tingling, no dizziness  Endocrine: No complaints of muscle weakness, no feelings of weakness, no frequent urination, no excessive thirst    Psychiatric: No suicidal thoughts, no anxiety, no feelings of depression  Active Problems    1  Acute bronchitis (466 0) (J20 9)   2   Advance directive discussed with patient (V65 49) (Z71 89)   3  Ankle pain, unspecified laterality   4  Atrial fibrillation (427 31) (I48 91)   5  Benign essential hypertension (401 1) (I10)   6  Dizziness (780 4) (R42)   7  Dyspnea on exertion (786 09) (R06 09)   8  Encounter for routine pelvic examination (V72 31) (Z01 419)   9  Encounter for screening mammogram for malignant neoplasm of breast (V76 12)   (Z12 31)   10  Ganglion (727 43) (M67 40)   11  Group B streptococcal infection (041 02) (A49 1)   12  History of food allergy (V15 05) (Z91 018)   13  Hypokalemia (276 8) (E87 6)   14  Hypothyroidism (244 9) (E03 9)   15  Jaw pain (784 92) (R68 84)   16  Medicare annual wellness visit, subsequent (V70 0) (Z00 00)   17  Meniere's disease (386 00) (H81 09)   18  Meralgia paresthetica (355 1) (G57 10)   19  Need for chickenpox vaccination (V05 4) (Z23)   20  Need for prophylactic vaccination and inoculation against influenza (V04 81) (Z23)   21  Need for vaccination with 13-polyvalent pneumococcal conjugate vaccine (V03 82) (Z23)   22  Paroxysmal atrial fibrillation (427 31) (I48 0)   23  Piriformis syndrome, unspecified laterality (355 0) (G57 00)   24  Preventive Medicine Estab Patient Checkup Adult Over 64 (V70 0)   25  Racing heart beat (785 0) (R00 0)   26  Screening for genitourinary condition (V81 6) (Z13 89)   27  Screening for neurological condition (V80 09) (Z13 89)   28  Screening for osteoporosis (V82 81) (Z13 820)   29  Thickened nails (703 8) (L60 2)   30  Vaginal Pap smear (V76 47) (Z12 72)   31  Vaginitis (616 10) (N76 0)    Past Medical History    1  History of hypokalemia (V12 29) (Z86 39)   2  Need for chickenpox vaccination (V05 4) (Z23)   3  Need for prophylactic vaccination and inoculation against influenza (V04 81) (Z23)    Surgical History    1  History of Hysterectomy   2  Preventive Medicine Estab Patient Checkup Adult Over 59 (V70 0)    Family History    1  Family history of Dementia    2   Family history of Colon Cancer (V16 0)    3  Family history of Family Health Status Of Father -    4  Family history of Family Health Status Of Mother -     Social History    · Denied: History of Drug Use   · Former smoker (F89 35) (Z54 847)   · Never Drank Alcohol    Current Meds   1  Aspirin Low Dose 81 MG TABS; TAKE 1 TABLET DAILY; Therapy: 02NWG5862 to (Evaluate:60Ury3031)  Requested for: 61Zhw6425 Recorded   2  Caltrate 600+D TABS; Take 1 tablet 3 x wk; Therapy: (Recorded:57Dho4361) to Recorded   3  Centrum Silver Ultra Womens Oral Tablet; TAKE 1/2 TABLET IN AM AND 1/2 IN PM;   Therapy: (Recorded:59Qsh8767) to Recorded   4  Claritin 10 MG Oral Capsule; TAKE CAPSULE Daily; Therapy: (Recorded:86Jsr5697) to Recorded   5  Clobetasol Propionate 0 05 % External Cream; PRN; Therapy: 83ZQO3288 to (Last Rx:2017)  Requested for: 19Rjo3940 Ordered   6  Fish Oil CAPS; Take 1 cap 3 x wk; Therapy: (Recorded:02Afs4776) to Recorded   7  Glucosamine TABS; Take 1 tablet 3 x wk; Therapy: (Recorded:43Bxq0465) to Recorded   8  Levothyroxine Sodium 88 MCG Oral Tablet; take 1 tablet by mouth every day; Therapy: 75Csh1022 to (Evaluate:34Frx1878)  Requested for: 05Smc0732; Last   Rx:10Nvb6251 Ordered   9  Metoprolol Tartrate 25 MG Oral Tablet; take 1 tablet once daily; Therapy: 78QEG3160 to (Evaluate:2018)  Requested for: 2017; Last   Rx:2017 Ordered   10  Triamterene-HCTZ 37 5-25 MG Oral Capsule; TAKE CAPSULE  PRN; Therapy: (Mikkiita Mustlobo) to  Requested for: 17Fkt6445 Recorded   11  Vitamin C 500 MG Oral Capsule; TAKE 1 CAPSULE 3 x wk; Therapy: (Recorded:34Ded6005) to Recorded   12  Vitamin D3 1000 UNIT Oral Capsule; TAKE CAPSULE 3 x wk; Therapy: (Recorded:06Jsf7566) to Recorded    Allergies    1  Advair Diskus AEPB   2  Penicillins   3  Latex Gloves MISC    4  House Dust   5  Mold   6   No Known Food Allergies    Vitals  Signs   Recorded: 07APU0770 11:57AM   Heart Rate: 69  Systolic: 707  Diastolic: 71  Height: 5 ft 5 in  Weight: 192 lb   BMI Calculated: 31 95  BSA Calculated: 1 94    Physical Exam      General: No acute distress, age-appropriate  Neck: Supple, trachea midline  HEENT: Normocephalic atraumatic, mucous membranes are moist, sclera are nonicteric  Cardiovascular: No discernable arrhythmia  Respiratory: Breathing is even and unlabored, no stridor or audible wheezing  Psychiatric: Awake alert and oriented x3, normal mood and affect  Abdomen: Without rebound or guarding  Left Basic: (Pt's FPL, APB and extensors intact, full strength  )   Left Carpal Tunnel: FDS Flexion Test and Normal APB Strength, but No AIN Weakness, Negative Pronator Stress Test, Negative Tinel's, No Chantelle's Compression, Negative Phalen's and No Thenar Atrophy   Carpal Tunnel Examination-   Left Cubital Tunnel: No Tinel's at Elbow and No Tinel's at wrist   Carpal Tunnel Examination-    Skin: was evaluated and demonstrated no masses, no abrasions, no erythema, no effusion, no edema, no fluctuance, no induration, no laceration, no ulceration, no lymphadenopathy  Left Upper Neurovascular: Median nerve has abnormal motor strength  Ulnar nerve has normal motor strength  Radial nerve has normal motor strength  Median nerve has abnormal sensation  Ulnar nerve had normal sensation  Radial nerve has normal sensation  Ulnar artery has a normal pulse  Radial artery has a normal pulse  Assessment    1  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)    Plan  Left median nerve neuropathy    1  Continue with our present treatment plan ; Status:Complete;   Done: 30YTL4788   2  Follow-up visit in 3 months Evaluation and Treatment  Follow-up  Status: Hold For -   Scheduling  Requested for: 91HPM7258    Discussion/Summary  Discussion Summary:   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   It was explained that this will get better with time, but there are some options to try to speed up recovery  These include medrol dose pack vs neurontin  Side effects of both medications discussed  At this time, patient will just watch and wait as her symptoms have already improved  If she is interested in either of her medications, she can call our office and we'll send it to her pharmacy        Future Appointments    Signatures   Electronically signed by : Ambar Suarez, [facility]; Dec  8 2017 12:27PM EST                       (Author)    Electronically signed by : SHREE Quigley ; Dec 13 2017  2:05PM EST                       (Author)

## 2018-01-23 NOTE — CONSULTS
Chief Complaint    1  Arm Pain    History of Present Illness  HPI: Patient is a pleasant 26-year-old female who presents here today with complaints of left forearm pain that radiates down into her hand  She states she was getting blood taken back in the beginning of November and during that time felt a sharp severe pain  Since then she continues to have discomfort primarily on the volar aspect of her forearm that radiates down into the palm and into the thumb  Patient denies numbness or tingling  She denies any weakness  The past medical history, surgical history, family history, social history, medications, allergies, and review of systems have been read and reviewed on the chart and have been updated  Review of Systems was recorded in the office today on a separate evaluation sheet and is listed below  Review of Systems    Constitutional: No fever, no chills, feels well, no tiredness, no recent weight gain or loss  Eyes: No complaints of eyesight problems, no red eyes  ENT: no loss of hearing, no nosebleeds, no sore throat  Cardiovascular: No complaints of chest pain, no palpitations, no leg claudication or lower extremity edema  Respiratory: no compliants of shortness of breath, no wheezing, no cough  Gastrointestinal: no complaints of abdominal pain, no constipation, no nausea or diarrhea, no vomiting, no bloody stools  Genitourinary: no complaints of dysuria, no incontinence  Musculoskeletal: as noted in HPI  Integumentary: no complaints of skin rash or lesion, no itching or dry skin, no skin wounds  Neurological: no complaints of headache, no confusion, no numbness or tingling, no dizziness  Endocrine: No complaints of muscle weakness, no feelings of weakness, no frequent urination, no excessive thirst    Psychiatric: No suicidal thoughts, no anxiety, no feelings of depression  Active Problems    1  Acute bronchitis (466 0) (J20 9)   2   Advance directive discussed with patient (V65 49) (Z71 89)   3  Ankle pain, unspecified laterality   4  Atrial fibrillation (427 31) (I48 91)   5  Benign essential hypertension (401 1) (I10)   6  Dizziness (780 4) (R42)   7  Dyspnea on exertion (786 09) (R06 09)   8  Encounter for routine pelvic examination (V72 31) (Z01 419)   9  Encounter for screening mammogram for malignant neoplasm of breast (V76 12)   (Z12 31)   10  Ganglion (727 43) (M67 40)   11  Group B streptococcal infection (041 02) (A49 1)   12  History of food allergy (V15 05) (Z91 018)   13  Hypokalemia (276 8) (E87 6)   14  Hypothyroidism (244 9) (E03 9)   15  Jaw pain (784 92) (R68 84)   16  Medicare annual wellness visit, subsequent (V70 0) (Z00 00)   17  Meniere's disease (386 00) (H81 09)   18  Meralgia paresthetica (355 1) (G57 10)   19  Need for chickenpox vaccination (V05 4) (Z23)   20  Need for prophylactic vaccination and inoculation against influenza (V04 81) (Z23)   21  Need for vaccination with 13-polyvalent pneumococcal conjugate vaccine (V03 82) (Z23)   22  Paroxysmal atrial fibrillation (427 31) (I48 0)   23  Piriformis syndrome, unspecified laterality (355 0) (G57 00)   24  Preventive Medicine Estab Patient Checkup Adult Over 64 (V70 0)   25  Racing heart beat (785 0) (R00 0)   26  Screening for genitourinary condition (V81 6) (Z13 89)   27  Screening for neurological condition (V80 09) (Z13 89)   28  Screening for osteoporosis (V82 81) (Z13 820)   29  Thickened nails (703 8) (L60 2)   30  Vaginal Pap smear (V76 47) (Z12 72)   31   Vaginitis (616 10) (N76 0)    Past Medical History    · History of hypokalemia (V12 29) (Z86 39)   · Need for chickenpox vaccination (V05 4) (Z23)   · Need for prophylactic vaccination and inoculation against influenza (V04 81) (Z23)    Surgical History    · History of Hysterectomy   · Preventive Medicine Estab Patient Checkup Adult Over 59 (V70 0)    Family History    · Family history of Dementia    · Family history of Colon Cancer (V16 0)    · Family history of Family Health Status Of Father -    · Family history of Family Health Status Of Mother -     Social History    · Denied: History of Drug Use   · Former smoker (V08 10) (L70 372)   · Never Drank Alcohol    Current Meds   1  Aspirin Low Dose 81 MG TABS; TAKE 1 TABLET DAILY; Therapy: 39CDB8452 to (Evaluate:94Ysh6273)  Requested for: 42Tws6668 Recorded   2  Caltrate 600+D TABS; Take 1 tablet 3 x wk; Therapy: (Recorded:31Ijq7675) to Recorded   3  Centrum Silver Ultra Womens Oral Tablet; TAKE 1/2 TABLET IN AM AND 1/2 IN PM;   Therapy: (Recorded:93Xwn7711) to Recorded   4  Claritin 10 MG Oral Capsule; TAKE CAPSULE Daily; Therapy: (Recorded:90Uhn0942) to Recorded   5  Clobetasol Propionate 0 05 % External Cream; PRN; Therapy: 60EYX8499 to (Last Rx:2017)  Requested for: 07Dav7415 Ordered   6  Fish Oil CAPS; Take 1 cap 3 x wk; Therapy: (Recorded:68Ejf8528) to Recorded   7  Glucosamine TABS; Take 1 tablet 3 x wk; Therapy: (Recorded:15Ccz2042) to Recorded   8  Levothyroxine Sodium 88 MCG Oral Tablet; take 1 tablet by mouth every day; Therapy: 68Mzu7854 to (Evaluate:16Mep6931)  Requested for: 97Xdu5613; Last   Rx:75Pbe2837 Ordered   9  Metoprolol Tartrate 25 MG Oral Tablet; take 1 tablet once daily; Therapy: 56QSF5370 to (Evaluate:2018)  Requested for: 2017; Last   Rx:47Ilf0569 Ordered   10  Triamterene-HCTZ 37 5-25 MG Oral Capsule; TAKE CAPSULE  PRN; Therapy: (Jani Rojas) to  Requested for: 95Wxw9565 Recorded   11  Vitamin C 500 MG Oral Capsule; TAKE 1 CAPSULE 3 x wk; Therapy: (Recorded:25Gjm6940) to Recorded   12  Vitamin D3 1000 UNIT Oral Capsule; TAKE CAPSULE 3 x wk; Therapy: (Recorded:79Faw4220) to Recorded    Allergies    1  Advair Diskus AEPB   2  Penicillins   3  Latex Gloves MISC    4  House Dust   5  Mold   6   No Known Food Allergies    Vitals  Signs    Heart Rate: 48KTBYKNGE: 101YLGGQEMGC: 60SXEEYA: 5 ft 5 inWeight: 192 lb BMI Calculated: 31 95BSA Calculated: 1 94    Physical Exam      General: No acute distress, age-appropriate  Neck: Supple, trachea midline  HEENT: Normocephalic atraumatic, mucous membranes are moist, sclera are nonicteric  Cardiovascular: No discernable arrhythmia  Respiratory: Breathing is even and unlabored, no stridor or audible wheezing  Psychiatric: Awake alert and oriented x3, normal mood and affect  Abdomen: Without rebound or guarding  Left Basic: (Pt's FPL, APB and extensors intact, full strength  )   Left Carpal Tunnel: FDS Flexion Test and Normal APB Strength, but No AIN Weakness, Negative Pronator Stress Test, Negative Tinel's, No Chantelle's Compression, Negative Phalen's and No Thenar Atrophy   Carpal Tunnel Examination-   Left Cubital Tunnel: No Tinel's at Elbow and No Tinel's at wrist   Carpal Tunnel Examination-    Skin: was evaluated and demonstrated no masses, no abrasions, no erythema, no effusion, no edema, no fluctuance, no induration, no laceration, no ulceration, no lymphadenopathy  Left Upper Neurovascular: Median nerve has abnormal motor strength  Ulnar nerve has normal motor strength  Radial nerve has normal motor strength  Median nerve has abnormal sensation  Ulnar nerve had normal sensation  Radial nerve has normal sensation  Ulnar artery has a normal pulse  Radial artery has a normal pulse  Assessment    1  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)    Plan     Left median nerve neuropathy    · Continue with our present treatment plan ; Status:Complete;   Done: 99XQL2565   · Follow-up visit in 3 months Evaluation and Treatment  Follow-up  Status: Hold For -  Scheduling  Requested for: 36MKD5239    Discussion/Summary    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  It was explained that this will get better with time, but there are some options to try to speed up recovery  These include medrol dose pack vs neurontin   Side effects of both medications discussed  At this time, patient will just watch and wait as her symptoms have already improved  If she is interested in either of her medications, she can call our office and we'll send it to her pharmacy        Signatures   Electronically signed by : Angeles Goodman, [facility]; Dec  8 2017 12:27PM EST                       (Author)    Electronically signed by : SHREE Anne ; Dec 13 2017  2:05PM EST                       (Author)

## 2018-01-30 ENCOUNTER — OFFICE VISIT (OUTPATIENT)
Dept: INTERNAL MEDICINE CLINIC | Facility: CLINIC | Age: 78
End: 2018-01-30
Payer: COMMERCIAL

## 2018-01-30 VITALS
HEIGHT: 65 IN | HEART RATE: 62 BPM | RESPIRATION RATE: 16 BRPM | OXYGEN SATURATION: 98 % | SYSTOLIC BLOOD PRESSURE: 124 MMHG | BODY MASS INDEX: 31.39 KG/M2 | WEIGHT: 188.4 LBS | DIASTOLIC BLOOD PRESSURE: 74 MMHG

## 2018-01-30 DIAGNOSIS — M54.50 LOW BACK PAIN WITHOUT SCIATICA, UNSPECIFIED BACK PAIN LATERALITY, UNSPECIFIED CHRONICITY: Primary | ICD-10-CM

## 2018-01-30 PROCEDURE — 99213 OFFICE O/P EST LOW 20 MIN: CPT | Performed by: INTERNAL MEDICINE

## 2018-01-30 RX ORDER — CYCLOBENZAPRINE HCL 5 MG
10 TABLET ORAL 3 TIMES DAILY PRN
Qty: 30 TABLET | Refills: 0 | Status: SHIPPED | OUTPATIENT
Start: 2018-01-30 | End: 2018-01-30 | Stop reason: SDUPTHER

## 2018-01-30 RX ORDER — MULTIVIT-MIN/IRON/FOLIC ACID/K 18-600-40
CAPSULE ORAL
COMMUNITY

## 2018-01-30 RX ORDER — LEVOTHYROXINE SODIUM 88 UG/1
1 TABLET ORAL DAILY
COMMUNITY
Start: 2015-04-25 | End: 2018-08-08 | Stop reason: SDUPTHER

## 2018-01-30 RX ORDER — METHYLPREDNISOLONE 4 MG/1
TABLET ORAL
COMMUNITY
Start: 2018-01-15 | End: 2018-08-08 | Stop reason: ALTCHOICE

## 2018-01-30 RX ORDER — CYCLOBENZAPRINE HCL 5 MG
5 TABLET ORAL
Qty: 5 TABLET | Refills: 0 | Status: SHIPPED | OUTPATIENT
Start: 2018-01-30 | End: 2020-10-07

## 2018-01-30 RX ORDER — UBIQUINOL 100 MG
CAPSULE ORAL
COMMUNITY

## 2018-01-30 RX ORDER — LORATADINE 10 MG/1
CAPSULE, LIQUID FILLED ORAL DAILY
COMMUNITY

## 2018-01-30 RX ORDER — CHOLECALCIFEROL (VITAMIN D3) 25 MCG
CAPSULE ORAL
COMMUNITY

## 2018-01-30 RX ORDER — MULTIVIT,IRON,MINERALS/LUTEIN
TABLET ORAL
COMMUNITY

## 2018-01-30 RX ORDER — CLOBETASOL PROPIONATE 0.5 MG/G
CREAM TOPICAL
COMMUNITY
Start: 2014-08-19 | End: 2019-04-03 | Stop reason: ALTCHOICE

## 2018-01-30 RX ORDER — TRIAMTERENE AND HYDROCHLOROTHIAZIDE 37.5; 25 MG/1; MG/1
CAPSULE ORAL AS NEEDED
COMMUNITY
End: 2018-08-02 | Stop reason: SDUPTHER

## 2018-01-30 NOTE — PROGRESS NOTES
Assessment/Plan:    No problem-specific Assessment & Plan notes found for this encounter  Problem List Items Addressed This Visit     None      Visit Diagnoses     Low back pain without sciatica, unspecified back pain laterality, unspecified chronicity    -  Primary    Relevant Medications    cyclobenzaprine (FLEXERIL) 5 mg tablet    Other Relevant Orders    Ambulatory referral to Pain Management    Ambulatory referral to Physical Therapy          Assessment and plan 1  Acute lower back pain I have reviewed the CT scan completed in the ER and the ER report; it does show multiple levels of degenerative disease, her symptoms are compatible muscle spasm I will start physical therapy, I have referred the patient to pain management the patient did request a muscle relaxant Flexeril 5 mg 1 p o  q h s  p r n  we have reviewed the risks benefits and side effects of the medication no on no driving after taking medication patient should check with her cardiologist prior to starting this medication (flexeril) (the patient will check with Cardiology if she can try Aleve as directed p r n  if she is not allowed to try the leave then she will try the Flexeril if okay with Cardiology) I did explain the warning with regards to geriatric patients to the patient the patient would like to proceed with the medication the risk outweigh the benefits  , warm compress, continue with Tylenol as directed p r n  return to office 1  month  call if any problems  Subjective:      Patient ID: Dashawn Bates is a 76 y o  female  3 weeks ago the lower back went into spasm , went to the er no injury      Back Pain   This is a new problem  The current episode started 1 to 4 weeks ago  The problem occurs constantly  The problem has been gradually improving since onset  The pain is present in the lumbar spine and thoracic spine  The quality of the pain is described as burning and cramping  The pain does not radiate   The pain is at a severity of 5/10  The pain is moderate  The pain is worse during the day  The symptoms are aggravated by sitting  Stiffness is present in the morning  Pertinent negatives include no abdominal pain, bladder incontinence, bowel incontinence, chest pain, dysuria, fever, headaches, leg pain, numbness, paresis, paresthesias, pelvic pain, perianal numbness, tingling, weakness or weight loss  Risk factors include obesity  She has tried analgesics for the symptoms  The treatment provided mild relief  The following portions of the patient's history were reviewed and updated as appropriate: allergies, past family history, past medical history, past social history, past surgical history and problem list     Review of Systems   Constitutional: Negative for fever and weight loss  Cardiovascular: Negative for chest pain  Gastrointestinal: Negative for abdominal pain and bowel incontinence  Genitourinary: Negative for bladder incontinence, dysuria and pelvic pain  Musculoskeletal: Positive for back pain  Neurological: Negative for tingling, weakness, numbness, headaches and paresthesias  Objective:                  No Follow-up on file  Allergies   Allergen Reactions    Dust Mite Extract     Fluticasone-Salmeterol Hives     Category: Allergy;     Latex     Molds & Smuts     Penicillins Hives     Category: Allergy; Past Medical History:   Diagnosis Date    Hypokalemia     last assessed: 4/25/2015     Past Surgical History:   Procedure Laterality Date    HYSTERECTOMY       Current Outpatient Prescriptions on File Prior to Visit   Medication Sig Dispense Refill    morphine (MSIR) 15 mg tablet Take 1 tablet by mouth every 6 (six) hours as needed for severe pain for up to 15 doses Max Daily Amount: 60 mg 15 tablet 0     No current facility-administered medications on file prior to visit        Family History   Problem Relation Age of Onset    Dementia Mother     Colon cancer Father      Social History     Social History    Marital status: /Civil Union     Spouse name: N/A    Number of children: N/A    Years of education: N/A     Occupational History    Not on file       Social History Main Topics    Smoking status: Never Smoker    Smokeless tobacco: Never Used      Comment: former smoker    Alcohol use No      Comment: never drank alcohol    Drug use: No    Sexual activity: Not on file     Other Topics Concern    Not on file     Social History Narrative    No narrative on file     Vitals:    01/30/18 1549   BP: 124/74   Pulse: 62   Resp: 16   SpO2: 98%     Results for orders placed or performed during the hospital encounter of 01/18/18   CBC and differential   Result Value Ref Range    WBC 7 19 4 31 - 10 16 Thousand/uL    RBC 4 42 3 81 - 5 12 Million/uL    Hemoglobin 14 4 11 5 - 15 4 g/dL    Hematocrit 40 6 34 8 - 46 1 %    MCV 92 82 - 98 fL    MCH 32 6 26 8 - 34 3 pg    MCHC 35 5 31 4 - 37 4 g/dL    RDW 13 5 11 6 - 15 1 %    MPV 9 9 8 9 - 12 7 fL    Platelets 783 420 - 083 Thousands/uL    Neutrophils Relative 62 43 - 75 %    Lymphocytes Relative 30 14 - 44 %    Monocytes Relative 8 4 - 12 %    Eosinophils Relative 0 0 - 6 %    Basophils Relative 0 0 - 1 %    Neutrophils Absolute 4 42 1 85 - 7 62 Thousands/µL    Lymphocytes Absolute 2 15 0 60 - 4 47 Thousands/µL    Monocytes Absolute 0 58 0 17 - 1 22 Thousand/µL    Eosinophils Absolute 0 02 0 00 - 0 61 Thousand/µL    Basophils Absolute 0 02 0 00 - 0 10 Thousands/µL   Comprehensive metabolic panel   Result Value Ref Range    Sodium 141 136 - 145 mmol/L    Potassium 4 1 3 5 - 5 3 mmol/L    Chloride 104 100 - 108 mmol/L    CO2 29 21 - 32 mmol/L    Anion Gap 8 4 - 13 mmol/L    BUN 17 5 - 25 mg/dL    Creatinine 0 81 0 60 - 1 30 mg/dL    Glucose 98 65 - 140 mg/dL    Calcium 9 6 8 3 - 10 1 mg/dL    AST 20 5 - 45 U/L    ALT 30 12 - 78 U/L    Alkaline Phosphatase 79 46 - 116 U/L    Total Protein 7 6 6 4 - 8 2 g/dL    Albumin 4 1 3 5 - 5 0 g/dL Total Bilirubin 0 40 0 20 - 1 00 mg/dL    eGFR 69 ml/min/1 73sq m   UA w Reflex to Microscopic w Reflex to Culture   Result Value Ref Range    Color, UA Yellow     Clarity, UA Clear     Specific Gravity, UA <=1 005 1 003 - 1 030    pH, UA 6 5 4 5 - 8 0    Leukocytes, UA Trace (A) Negative    Nitrite, UA Negative Negative    Protein, UA Negative Negative mg/dl    Glucose, UA Negative Negative mg/dl    Ketones, UA Negative Negative mg/dl    Urobilinogen, UA 0 2 0 2, 1 0 E U /dl E U /dl    Bilirubin, UA Negative Negative    Blood, UA Negative Negative   Urine Microscopic   Result Value Ref Range    RBC, UA None Seen None Seen, 0-5 /hpf    WBC, UA 0-1 (A) None Seen, 0-5, 5-55, 5-65 /hpf    Epithelial Cells None Seen None Seen, Occasional /hpf    Bacteria, UA None Seen None Seen, Occasional /hpf     Weight (last 2 days)     Date/Time   Weight    01/30/18 1549  85 5 (188 4)           Back examination there is hypertonicity of the paravertebral musculature of the lumbar spine L2 through L3 region there is worsening of her pain with rotation and side bending, muscle strength 5/5 bilateral lower extremity, DTR 2/4 bilateral lower extremity, sensation intact, straight leg raising approximately 70° bilateral lower extremity  Body mass index is 31 35 kg/m²  Physical Exam   Constitutional: She appears well-developed and well-nourished  HENT:   Head: Normocephalic  Mouth/Throat: Oropharynx is clear and moist    Eyes: Conjunctivae are normal  Pupils are equal, round, and reactive to light  Neck: Neck supple  Cardiovascular: Normal rate, regular rhythm, normal heart sounds and intact distal pulses  Exam reveals no gallop and no friction rub  No murmur heard  Pulmonary/Chest: Breath sounds normal  No respiratory distress  She has no wheezes  She has no rales  Musculoskeletal: She exhibits no edema  Neurological: She is alert

## 2018-01-31 ENCOUNTER — TELEPHONE (OUTPATIENT)
Dept: CARDIOLOGY CLINIC | Facility: CLINIC | Age: 78
End: 2018-01-31

## 2018-03-02 ENCOUNTER — CONSULT (OUTPATIENT)
Dept: PAIN MEDICINE | Facility: CLINIC | Age: 78
End: 2018-03-02
Payer: COMMERCIAL

## 2018-03-02 VITALS
WEIGHT: 192 LBS | DIASTOLIC BLOOD PRESSURE: 80 MMHG | SYSTOLIC BLOOD PRESSURE: 132 MMHG | BODY MASS INDEX: 31.99 KG/M2 | TEMPERATURE: 97.7 F | HEIGHT: 65 IN | HEART RATE: 66 BPM

## 2018-03-02 DIAGNOSIS — M47.816 FACET DEGENERATION OF LUMBAR REGION: ICD-10-CM

## 2018-03-02 DIAGNOSIS — M48.062 LUMBAR STENOSIS WITH NEUROGENIC CLAUDICATION: Primary | ICD-10-CM

## 2018-03-02 PROCEDURE — 99204 OFFICE O/P NEW MOD 45 MIN: CPT | Performed by: ANESTHESIOLOGY

## 2018-03-02 NOTE — PATIENT INSTRUCTIONS

## 2018-03-02 NOTE — PROGRESS NOTES
Assessment:  1  Lumbar stenosis with neurogenic claudication    2  Facet degeneration of lumbar region        Plan:  The patient's symptoms, history/physical are consistent with pain that is multifactorial in origin  She has evidence of spinal stenosis as well as lumbar spondylosis  Likely she experienced severe spasm in mid January that was causing her severe pain symptoms that are much better now and at this time I recommended a course of physical therapy for back and core strengthening which she was amenable to  She will follow back up with me in 2 months for re-evaluation but was advised to contact our office should symptoms worsen  My impressions and treatment recommendations were discussed in detail with the patient who verbalized understanding and had no further questions  Discharge instructions were provided  I personally saw and examined the patient and I agree with the above discussed plan of care  Orders Placed This Encounter   Procedures    Ambulatory referral to Physical Therapy     Standing Status:   Future     Standing Expiration Date:   9/2/2018     Referral Priority:   Routine     Referral Type:   Physical Therapy     Referral Reason:   Specialty Services Required     Requested Specialty:   Physical Therapy     Number of Visits Requested:   1     Expiration Date:   3/2/2019     No orders of the defined types were placed in this encounter  History of Present Illness: Edgar Pradhan is a 76 y o  female who presents for consultation in regards to mid to lower back pain and spasms  Symptoms have been present for approximately 2 months without any precipitating injury or trauma  She had a severe episode in mid January when she was sitting on the toilet and had difficulty walking to the bed so went to the emergency room  CT scan of the lumbar spine and CT scan of the abdomen pelvis were performed  The CT scan lumbar spine showed spinal stenosis    She subsequently followed back up with Dr Corine Gaming who was referred here for further treatment  Symptoms are moderate to severe rated 4-7/10 on a numeric rating scale and felt nearly constantly  Pain is located in the mid to lower back described to be dull/aching, throbbing  She denies any weakness of the legs, but does get some numbness going down the left leg     Symptoms are aggravated with bending, standing, walking  Symptoms are improved with lying down and sitting  There is no change with coughing, sneezing or bowel movements  Treatment history has included remote history of physical therapy which had provided moderate relief  Exercise on her own provides moderate relief  She takes Tylenol which provides mild relief  She was given a prescription for Flexeril 5 mg but has not taken it  I have personally reviewed and/or updated the patient's past medical history, past surgical history, family history, social history, current medications, allergies, and vital signs today  Review of Systems:    Review of Systems   Constitutional: Negative for fever and unexpected weight change  HENT: Negative for trouble swallowing  Eyes: Negative for visual disturbance  Respiratory: Negative for shortness of breath and wheezing  Cardiovascular: Negative for chest pain and palpitations  Gastrointestinal: Negative for constipation, diarrhea, nausea and vomiting  Endocrine: Negative for cold intolerance, heat intolerance and polydipsia  Genitourinary: Negative for difficulty urinating and frequency  Musculoskeletal: Positive for joint swelling  Negative for arthralgias, gait problem and myalgias  Skin: Negative for rash  Neurological: Positive for weakness  Negative for seizures, syncope and headaches  Hematological: Does not bruise/bleed easily  Psychiatric/Behavioral: Negative for dysphoric mood  All other systems reviewed and are negative        Patient Active Problem List   Diagnosis    Atrial fibrillation ([facility] [address] )    Benign essential hypertension    Hypothyroidism       Past Medical History:   Diagnosis Date    Arthritis     Atrial fibrillation (HCC)     Hypokalemia     last assessed: 4/25/2015       Past Surgical History:   Procedure Laterality Date    HYSTERECTOMY         Family History   Problem Relation Age of Onset    Dementia Mother     Colon cancer Father        Social History     Occupational History    Not on file  Social History Main Topics    Smoking status: Former Smoker     Quit date: 1973    Smokeless tobacco: Never Used      Comment: former smoker    Alcohol use No      Comment: never drank alcohol    Drug use: No    Sexual activity: Not on file       Current Outpatient Prescriptions on File Prior to Visit   Medication Sig    Ascorbic Acid (VITAMIN C) 500 MG CAPS Take by mouth    aspirin 81 MG tablet Take 1 tablet by mouth daily    Calcium Carb-Cholecalciferol (CALTRATE 600+D) 600-800 MG-UNIT TABS Take by mouth    Cholecalciferol (VITAMIN D-3) 1000 units CAPS Take by mouth    clobetasol (TEMOVATE) 0 05 % cream Apply topically    Glucosamine 750 MG TABS Take by mouth    levothyroxine 88 mcg tablet Take 1 tablet by mouth daily    Loratadine (CLARITIN) 10 MG CAPS Take by mouth daily    metoprolol tartrate (LOPRESSOR) 25 mg tablet Take 1 tablet by mouth daily    Multiple Vitamins-Minerals (CENTRUM SILVER ULTRA WOMENS) TABS Take by mouth    Omega-3 Fatty Acids (FISH OIL) 645 MG CAPS Take by mouth    cyclobenzaprine (FLEXERIL) 5 mg tablet Take 1 tablet (5 mg total) by mouth daily at bedtime for 5 days    Methylprednisolone 4 MG TBPK Take by mouth    morphine (MSIR) 15 mg tablet Take 1 tablet by mouth every 6 (six) hours as needed for severe pain for up to 15 doses Max Daily Amount: 60 mg    triamterene-hydrochlorothiazide (DYAZIDE) 37 5-25 mg per capsule Take by mouth     No current facility-administered medications on file prior to visit          Allergies   Allergen Reactions    Dust Mite Extract     Fluticasone-Salmeterol Hives     Category: Allergy;     Latex     Molds & Smuts     Penicillins Hives     Category: Allergy; Physical Exam:    /80   Pulse 66   Temp 97 7 °F (36 5 °C) (Oral)   Ht 5' 5" (1 651 m)   Wt 87 1 kg (192 lb)   BMI 31 95 kg/m²     Constitutional: normal, well developed, well nourished, alert, in no distress and non-toxic and no overt pain behavior  and obese  Eyes: anicteric  HEENT: grossly intact  Neck: supple, symmetric, trachea midline and no masses   Pulmonary:even and unlabored  Cardiovascular:No edema or pitting edema present  Skin:Normal without rashes or lesions and well hydrated  Psychiatric:Mood and affect appropriate  Neurologic:Cranial Nerves II-XII grossly intact  Musculoskeletal:antalgic     Lumbar Spine Exam  Appearance:  Normal lordosis  Palpation/Tenderness:  Bilateral lumbar facet tenderness at L4-5, L5-S1 with positive facet loading  Sensory:  no sensory deficits noted  Range of Motion:  Full range of motion with no pain or limitations in flexion, extension, lateral flexion and rotation  Motor Strength:  Left hip flexion:  5/5  Left hip extension:  5/5  Right hip flexion:  5/5  Right hip extension:  5/5  Left knee flexion:  5/5  Left knee extension:  5/5  Right knee flexion:  5/5  Right knee extension:  5/5  Left foot dorsiflexion:  5/5  Left foot plantar flexion:  5/5  Right foot dorsiflexion:  5/5  Right foot plantar flexion:  5/5  Reflexes:  Left Patellar:  2+   Right Patellar:  2+   Left Achilles:  2+   Right Achilles:  2+     Imaging    CT LUMBAR SPINE (1/18/18)     INDICATION:  Back pain      COMPARISON:  5/21/2007  4     TECHNIQUE: Axial CT examination of the lumbar spine was obtained utilizing reconstructed images from CT of the chest, abdomen and pelvis performed the same day    Images were reformatted in the sagittal and coronal planes      This examination, like all CT scans performed in the [facility], was performed utilizing techniques to minimize radiation dose exposure, including the use of iterative reconstruction and automated exposure control        FINDINGS:     ALIGNMENT: Minimal grade 1 anterolisthesis of L4 and L5 is noted      VERTEBRAL BODIES: No fracture  No acute osseous abnormality      DEGENERATIVE CHANGES:      L1-2: Degenerative disc disease with vacuum disc phenomenon is noted  Posterior disc osteophyte complex results in mild bilateral foraminal stenosis      L2-3: Degenerative disc disease is present with vacuum disc, not present  Posterior disc osteophyte complex and bilateral facet arthropathy results in mild bilateral foraminal stenosis      L3-4: Degenerative disc disease and vacuum disc phenomenon present  Posterior disc osteophyte complex and right greater than left facet arthropathy results in mild to moderate right foraminal stenosis and mild left foraminal stenosis      L4-5: Vacuum disc dominant mass present  Diffuse disc bulge and advanced bilateral facet arthropathy results in moderate to severe bilateral foraminal stenosis  Moderate central canal stenosis is present      L5-S1: Diffuse disc bulge and bilateral facet arthropathy results in moderate right and mild left foraminal stenosis      PREVERTEBRAL AND PARASPINAL SOFT TISSUES: Normal   No hematoma      IMPRESSION:     Degenerative changes as described

## 2018-03-22 ENCOUNTER — EVALUATION (OUTPATIENT)
Dept: PHYSICAL THERAPY | Facility: CLINIC | Age: 78
End: 2018-03-22
Payer: COMMERCIAL

## 2018-03-22 DIAGNOSIS — M47.816 FACET DEGENERATION OF LUMBAR REGION: ICD-10-CM

## 2018-03-22 DIAGNOSIS — M48.062 LUMBAR STENOSIS WITH NEUROGENIC CLAUDICATION: ICD-10-CM

## 2018-03-22 PROCEDURE — G8978 MOBILITY CURRENT STATUS: HCPCS | Performed by: PHYSICAL THERAPIST

## 2018-03-22 PROCEDURE — 97110 THERAPEUTIC EXERCISES: CPT | Performed by: PHYSICAL THERAPIST

## 2018-03-22 PROCEDURE — G8979 MOBILITY GOAL STATUS: HCPCS | Performed by: PHYSICAL THERAPIST

## 2018-03-22 PROCEDURE — 97162 PT EVAL MOD COMPLEX 30 MIN: CPT | Performed by: PHYSICAL THERAPIST

## 2018-03-22 RX ORDER — ACETAMINOPHEN 160 MG/5ML
650 SOLUTION ORAL EVERY 4 HOURS PRN
COMMUNITY

## 2018-03-22 NOTE — PROGRESS NOTES
PT Evaluation     Today's date: 3/22/2018  Patient name: Stan Valentin  : 1940  MRN: [id number]  Referring provider: Neal Shipley MD  Dx:   Encounter Diagnosis     ICD-10-CM    1  Lumbar stenosis with neurogenic claudication M48 062 Ambulatory referral to Physical Therapy   2  Facet degeneration of lumbar region M47 816 Ambulatory referral to Physical Therapy                  Assessment    Assessment details: Stan Valentin is a pleasant 76 y o  presenting to physical therapy with MD referral for Lumbar stenosis with neurogenic claudication  Facet degeneration of lumbar region  Problem list:  Limited lumbar ROM, decreased hip/core strength, limited lower extremity flexibility, poor balance, and abnormal gait  Treatment to include: Manual therapy techniques, lower extremity/core strengthening, neuromuscular control exercises, balance/proprioception training, instruction in a comprehensive HEP, and modalities as needed  This pt would benefit from skilled PT services to address their impairments and functional limitations to maximize functional outcome  Barriers to therapy: BMI > 30, age, stenosis, meniere's disease  Understanding of Dx/Px/POC: good   Prognosis: good    Goals  ST  Pt will improve hip flexor flexibility to no more than mild restriction in 3 weeks  2  Pt will improve SLS to at least 5 seconds bilaterally in 3 weeks  LT  Pt will be able to negotiate stairs with a reciprocal pattern with no more than mild pain in 6 weeks  2  Pt will be independent in a comprehensive HEP in 6 weeks  Subjective Evaluation    History of Present Illness  Mechanism of injury: Patient reports upper back pain since she was a teenager; however, onset of lower back pain since age 34  Pt does not recall one specific event which caused her lower back problem  Pt reports lower back pain has waxed and wanted over the past 39 yrs   Pt states since 2018, she began to notice an increase in her lower back pain  Pt attributed this to lack of exercise  Pt states she was hospitalized on 18, due to severe pain in her lower back  Pt states the night before, she experienced severe pain inher lower back while sitting on the toilet and was unable to get off the toilet  Pt states she crawled into her room and went to the hospital the next day  Pt states they performed a CAT scan, blood work, and urine sample  Per pt, the CAT scan revealed stenosis and arthritis in her lower back  Pt denies any pain traveling down her legs; however, reports her left leg goes numb from her hips to her knee with prolonged standing and walking for the past 6-8 years  Pt had EMG performed which revealed a pinched nerve  Premorbid status:  - ADLs: Indepenent with mild difficulty  - Work: Not a working individual  - Recreation:[employer] 4 x week (yoga, line dancing, aerobics and stretching)    Current status:   - ADLs/Functional activities:    - Stairs Step to pattern with Pain Levels: mild pain   - Sit to stand with use of BUEs with no pain   - Walking 1/4 mile with mild pain, moderate fatigue in BLEs   - Standing 60 minutes with mild pain   - Sitting 60 minutes prior to onset of stiffness    - Sleeping with 0  nightly sleep disturbances due to pain   - Turning in bed with moderate pain   - Bending forward to don/doff socks and shoes with no pain   - Lifting > 5# with increased pain everywhere  - Work: Not a working individual  - Recreation: [employer] 2 x per week  Pain  Current pain ratin  At best pain ratin  At worst pain ratin  Location: across lower back  Quality: dull ache  Relieving factors: change in position  Aggravating factors: walking, standing and stair climbing  Progression: improved      Diagnostic Tests  CT scan: abnormal  Treatments  Previous treatment: physical therapy        Objective     Palpation     Additional Palpation Details  No tenderness to palpation of bilateral lumbar PVMs         Active Range of Motion     Lumbar   Flexion: 80 degrees   Extension: 20 degrees   Left lateral flexion: 9 degrees   Right lateral flexion: 11 degrees     Strength/Myotome Testing     Left Hip   Planes of Motion   Flexion: 4  Abduction: 4-  Adduction: 4  External rotation: 4    Right Hip   Planes of Motion   Flexion: 4+  Abduction: 4-  Adduction: 4  External rotation: 4    Left Knee   Flexion: 5  Extension: 5    Right Knee   Flexion: 5  Extension: 5    Left Ankle/Foot   Dorsiflexion: 5  Plantar flexion: 5    Right Ankle/Foot   Dorsiflexion: 5  Plantar flexion: 5    Additional Strength Details  SLS L: 3 seconds - contralateral hip drop  SLS R: 9 seconds- contralateral hip drop    Flexibility:  - Hs: no restriction B  - hip Ir: mild restriction B  - hip Er: no restriction  - hip flexors: mod restriction B    Gait: Pt ambulates over level surface with compensated trendelenburg deviation with no assistive device  Precautions: Meniere's Disease, A-fib    Daily Treatment Diary     Manual  3-22 (IE)                                                                                 Exercise Diary  3-22 (IE)            TM 5 mins NV            Seated Pball QL str 4 x 30" ea NV                         Stading:             - hip abduction 2 x10 ea NV            - hip extension 2 x 10 ea NV            - lateral band walks 2 laps red NV            - SLS 15" x 3 ea NV            - Rockerboard A/P, M/L 20 taps, 30" balance NV                         Lateral step up and overs NV            Front step ups NV                                      Laying:             - SL clams NV            - bridges NV                                                       Modalities  3-22 (IE)                                                   * Educated pt on anatomy, pathology, and exercise rationale  Provided pt with HEP and ensured proper exercise performance   Pt will be out of town for the next 1-2 weeks; therefore, initial HEP was longer than typical       Access Code: [email or web address]   URL: [email or web address]   Date: 03/22/2018   Prepared by: Kimberly Wade      Exercises  0 Supine Posterior Pelvic Tilt - 10 reps - 10 hold - 2x daily - 7x weekly  0 Supine Lower Trunk Rotation - 10 reps - 2 sets - 2 hold - 2x daily - 7x weekly  0 Hip Flexion Stretch - 4 sets - 30 hold - 2x daily - 7x weekly  0 Supine Piriformis Stretch - 4 sets - 30 hold - 2x daily - 7x weekly  0 Standing Hip Flexor Stretch - 4 reps - 30 hold - 2x daily - 7x weekly  0 Standing Hip Abduction - 10 reps - 2 sets - 1x daily - 7x weekly  0 Standing Hip Extension - 10 reps - 2 sets - 1x daily - 7x weekly

## 2018-04-12 ENCOUNTER — OFFICE VISIT (OUTPATIENT)
Dept: PHYSICAL THERAPY | Facility: CLINIC | Age: 78
End: 2018-04-12
Payer: COMMERCIAL

## 2018-04-12 DIAGNOSIS — M47.816 FACET DEGENERATION OF LUMBAR REGION: ICD-10-CM

## 2018-04-12 DIAGNOSIS — M48.062 LUMBAR STENOSIS WITH NEUROGENIC CLAUDICATION: Primary | ICD-10-CM

## 2018-04-12 PROCEDURE — 97110 THERAPEUTIC EXERCISES: CPT | Performed by: PHYSICAL THERAPIST

## 2018-04-12 PROCEDURE — 97112 NEUROMUSCULAR REEDUCATION: CPT | Performed by: PHYSICAL THERAPIST

## 2018-04-12 NOTE — PROGRESS NOTES
Daily Note     Today's date: 2018  Patient name: Ingrid Connor  : 1940  MRN: [id number]  Referring provider: Hany Miguel MD  Dx:   Encounter Diagnosis     ICD-10-CM    1  Lumbar stenosis with neurogenic claudication M48 062    2  Facet degeneration of lumbar region M47 816                   Subjective: Patient reports she was able to walk about 1/4 mile prior to increase in pain  Pt reports she has been sporadic with HEP compliance  Objective: See treatment diary below      Assessment: Tolerated treatment well  Patient exhibited good technique with therapeutic exercises and would benefit from continued PT  Pt was fatigued at conclusion of session; however, reported minimal increase in lower back discomfort  Plan: Progress treatment as tolerated        Precautions: Meniere's Disease, A-fib     Daily Treatment Diary      Manual  3- (IE)                                                                                                                                                   Exercise Diary  3- (IE)  4-12                   TM 5 mins NV  5mins 3 0 mph                   Seated Pball QL str 4 x 30" ea NV  4 x 30" ea                                           Stading:                       - hip abduction 2 x10 ea NV  3 x 10 ea                   - hip extension 2 x 10 ea NV  3 x 10 3#                   - lateral band walks 2 laps red NV  1 min s, red                    - SLS 15" x 3 ea NV  15" x 3 ea                   - Rockerboard A/P, M/L 20 taps, 30" balance NV  A/P, M/L 20 taps, 30" balance                                           Lateral step up and overs NV 10 x 6"                   Front step ups NV  10 x 6"                                                                   Laying:                       - SL clams NV  2 x 10 ea                   - bridges NV  2 x 10 red TB                                                                                                 Modalities 3-22 (IE)

## 2018-04-19 ENCOUNTER — OFFICE VISIT (OUTPATIENT)
Dept: PHYSICAL THERAPY | Facility: CLINIC | Age: 78
End: 2018-04-19
Payer: COMMERCIAL

## 2018-04-19 DIAGNOSIS — M48.062 LUMBAR STENOSIS WITH NEUROGENIC CLAUDICATION: Primary | ICD-10-CM

## 2018-04-19 DIAGNOSIS — M47.816 FACET DEGENERATION OF LUMBAR REGION: ICD-10-CM

## 2018-04-19 PROCEDURE — 97110 THERAPEUTIC EXERCISES: CPT | Performed by: PHYSICAL THERAPIST

## 2018-04-19 PROCEDURE — 97112 NEUROMUSCULAR REEDUCATION: CPT | Performed by: PHYSICAL THERAPIST

## 2018-04-19 NOTE — PROGRESS NOTES
Daily Note     Today's date: 2018  Patient name: Doris Schwarz  : 1940  MRN: [id number]  Referring provider: Su Reddy MD  Dx:   Encounter Diagnosis     ICD-10-CM    1  Lumbar stenosis with neurogenic claudication M48 062    2  Facet degeneration of lumbar region M47 816                   Subjective: Patient reports increase in right knee pain following previous session; however, no increase in lower back discomfort  Objective: See treatment diary below      Assessment: Tolerated treatment well  Patient demonstrated fatigue post treatment, exhibited good technique with therapeutic exercises and would benefit from continued PT      Plan: Progress treatment as tolerated            Precautions: Meniere's Disease, A-fib     Daily Treatment Diary      Manual  3-22 (IE)                                                                                                                                                   Exercise Diary  3- (IE)  -  4-19                 TM 5 mins NV  5mins 3 0 mph  5mins 3 0 mph                 Seated Pball QL str 4 x 30" ea NV  4 x 30" ea  4 x 30" ea                                         Stading:                       - hip abduction 2 x10 ea NV  3 x 10 ea  3 x 10 ea                 - hip extension 2 x 10 ea NV  3 x 10 3#  3 x 10 3#                 - lateral band walks 2 laps red NV  1 mins, red   1 min x 2  Red                 - SLS 15" x 3 ea NV  15" x 3 ea  15" x 3 ea                 - Rockerboard A/P, M/L 20 taps, 30" balance NV  A/P, M/L 20 taps, 30" balance  A/P, M/L 20 taps, 30" balance                                         Lateral step up and overs NV 10 x 6"  10 x 6"                 Front step ups NV  10 x 6"  10 ea x 6"                                                                 Laying:                       - SL clams NV  2 x 10 ea  2 x 10 ea red TB                 - bridges NV  2 x 10 red TB  2 x 10 red TB                                       Seated on pball:                        - TA with alt marches     2 x 10 ea                 - TA with MB lift overhead   2 x 10 RMB          - TA with MB chops and lifts   10 x ea GMB                             Modalities  3-22 (IE)

## 2018-04-26 ENCOUNTER — OFFICE VISIT (OUTPATIENT)
Dept: PHYSICAL THERAPY | Facility: CLINIC | Age: 78
End: 2018-04-26
Payer: COMMERCIAL

## 2018-04-26 DIAGNOSIS — M48.062 LUMBAR STENOSIS WITH NEUROGENIC CLAUDICATION: Primary | ICD-10-CM

## 2018-04-26 DIAGNOSIS — M47.816 FACET DEGENERATION OF LUMBAR REGION: ICD-10-CM

## 2018-04-26 PROCEDURE — 97112 NEUROMUSCULAR REEDUCATION: CPT | Performed by: PHYSICAL THERAPIST

## 2018-04-26 PROCEDURE — G8978 MOBILITY CURRENT STATUS: HCPCS | Performed by: PHYSICAL THERAPIST

## 2018-04-26 PROCEDURE — G8980 MOBILITY D/C STATUS: HCPCS | Performed by: PHYSICAL THERAPIST

## 2018-04-26 PROCEDURE — G8979 MOBILITY GOAL STATUS: HCPCS | Performed by: PHYSICAL THERAPIST

## 2018-04-26 PROCEDURE — 97110 THERAPEUTIC EXERCISES: CPT | Performed by: PHYSICAL THERAPIST

## 2018-04-26 NOTE — PROGRESS NOTES
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                   Assessment    Assessment details: Fior Fleming is a pleasant 76 y o  presenting to physical therapy with MD referral for Lumbar stenosis with neurogenic claudication, Facet degeneration of lumbar region  Since time of initial evaluation, pt has made good improvement in lower extremity strength and flexibility, lumbar ROM, and functional balance  Problem list:  Limited lumbar ROM, decreased hip/core strength, limited lower extremity flexibility, poor balance, and abnormal gait  Treatment to include: Manual therapy techniques, lower extremity/core strengthening, neuromuscular control exercises, balance/proprioception training, instruction in a comprehensive HEP, and modalities as needed  This pt would benefit from skilled PT services to address their impairments and functional limitations to maximize functional outcome  Barriers to therapy: BMI > 30, age, stenosis, meniere's disease  Understanding of Dx/Px/POC: good   Prognosis: good    Goals  ST  Pt will improve hip flexor flexibility to no more than mild restriction in 2 weeks  MET  2  Pt will improve SLS to at least 5 seconds bilaterally in 3 weeks  MET  3  Pt will improve hip abduction strength to at least 4/5 in 2 weeks  4  Pt will improve hip ER strength to 4+/5 in 2 weeks  LT  Pt will be able to negotiate stairs with a reciprocal pattern with no more than mild pain in 4 weeks  PARTIALLY MET (inconsistent)  2  Pt will be independent in a comprehensive HEP in 4 weeks      Plan  Patient would benefit from: skilled PT  Frequency: 2x week  Duration in weeks: 4  Treatment plan discussed with: patient        Subjective Evaluation    History of Present Illness  Mechanism of injury: Current status:   - ADLs/Functional activities:    - Stairs Step to pattern with Pain Levels: mild pain 50% of the time reciprocal pattern   - Sit to stand with use of BUEs with no pain   - Walking 1 hour with moderate fatigue in BLEs (improved)   - Standing 60 minutes with mild pain   - Sitting 60 minutes prior to onset of stiffness    - Sleeping with 0  nightly sleep disturbances due to pain   - Turning in bed with moderate pain   - Bending forward to don/doff socks and shoes with no pain   - Lifting > 5# with increased pain everywhere  - Work: Not a working individual  - Recreation: [employer] 2 x per week    Since time of initial evaluation, pt has only attended 4 sessions; however, functionally reports increased standing tolerance and walking tolerance have improved  Although improvements have been made, this pt would continue to benefit from skilled PT services to maximize functional outcome  Pain  Current pain ratin  At best pain ratin  At worst pain ratin  Location: across lower back  Quality: dull ache  Relieving factors: change in position  Aggravating factors: walking, standing and stair climbing  Progression: improved      Diagnostic Tests  CT scan: abnormal  Treatments  Previous treatment: physical therapy        Objective     Palpation     Additional Palpation Details  No tenderness to palpation of bilateral lumbar PVMs         Active Range of Motion     Lumbar   Flexion: 80 degrees   Extension: 20 degrees   Left lateral flexion: 15 degrees   Right lateral flexion: 15 degrees     Strength/Myotome Testing     Left Hip   Planes of Motion   Flexion: 5  Abduction: 4-  Adduction: 4+  External rotation: 4    Right Hip   Planes of Motion   Flexion: 5  Abduction: 4-  Adduction: 4+  External rotation: 4+    Left Knee   Flexion: 5  Extension: 5    Right Knee   Flexion: 5  Extension: 5    Left Ankle/Foot   Dorsiflexion: 5  Plantar flexion: 5    Right Ankle/Foot   Dorsiflexion: 5  Plantar flexion: 5    Additional Strength Details  SLS L: 5 seconds - contralateral hip drop  SLS R: 5 seconds- contralateral hip drop    Flexibility:  - Hs: no restriction B  - hip Ir: mild restriction B  - hip Er: no restriction  - hip flexors: minimal restriction B    Gait: Pt ambulates over level surface with compensated trendelenburg deviation with no assistive device            Precautions: Meniere's Disease, A-fib     Daily Treatment Diary      Manual  3-22 (IE)                                                                                                                                                   Exercise Diary  3-22 (IE)  4-12 4-19 4-26               TM 5 mins NV  5mins 3 0 mph  5mins 3 0 mph  5mins 3 0 mph               Seated Pball QL str 4 x 30" ea NV  4 x 30" ea  4 x 30" ea  4 x 30" ea                                       Stading:                       - hip abduction 2 x10 ea NV  3 x 10 ea  3 x 10 ea  3 x 10 ea 3#               - hip extension 2 x 10 ea NV  3 x 10 3#  3 x 10 3#  3 x 10 ea 3#               - lateral band walks 2 laps red NV  1 mins, red   1 min x 2  Red  1 min   Red               - SLS 15" x 3 ea NV  15" x 3 ea  15" x 3 ea  15" x 3 ea               - Rockerboard A/P, M/L 20 taps, 30" balance NV  A/P, M/L 20 taps, 30" balance  A/P, M/L 20 taps, 30" balance  A/P, M/L 20 taps, 30" balance                                       Lateral step up and overs NV 10 x 6"  10 x 6"  20 x 6"               Front step ups NV  10 x 6"  10 ea x 6"  10 ea x 6"                                                               Laying:                       - SL clams NV  2 x 10 ea  2 x 10 ea red TB  2 x 10 ea red TB               - bridges NV  2 x 10 red TB  2 x 10 red TB  2 x 10 red TB                                       Seated on pball:                        - TA with alt marches     2 x 10 ea  2 x 10 ea               - TA with MB lift overhead     2 x 10 RMB  2 x 10 RMB               - TA with MB chops and lifts     10 x ea GMB  10 x ea GMB                                             Modalities  3-22 (IE)                                                                                                0

## 2018-05-14 NOTE — PROGRESS NOTES
Addendum: Added discharge G-codes and resolved episode of care  Pt has not returned for treatment following re-evaluation

## 2018-05-20 DIAGNOSIS — I10 ESSENTIAL HYPERTENSION: Primary | ICD-10-CM

## 2018-08-02 DIAGNOSIS — I10 ESSENTIAL HYPERTENSION: Primary | ICD-10-CM

## 2018-08-02 RX ORDER — TRIAMTERENE AND HYDROCHLOROTHIAZIDE 37.5; 25 MG/1; MG/1
1 CAPSULE ORAL DAILY
Qty: 90 CAPSULE | Refills: 1 | Status: SHIPPED | OUTPATIENT
Start: 2018-08-02 | End: 2018-08-08

## 2018-08-08 DIAGNOSIS — E03.9 HYPOTHYROIDISM, UNSPECIFIED TYPE: Primary | ICD-10-CM

## 2018-08-08 DIAGNOSIS — I10 ESSENTIAL HYPERTENSION: ICD-10-CM

## 2018-08-08 RX ORDER — TRIAMTERENE AND HYDROCHLOROTHIAZIDE 37.5; 25 MG/1; MG/1
1 CAPSULE ORAL DAILY
Qty: 90 CAPSULE | Refills: 0 | Status: CANCELLED | OUTPATIENT
Start: 2018-08-08

## 2018-08-08 NOTE — TELEPHONE ENCOUNTER
The Maxzide is not on the patient's medication list please verify with the patient that she is taking this medication

## 2018-08-09 ENCOUNTER — OFFICE VISIT (OUTPATIENT)
Dept: CARDIOLOGY CLINIC | Facility: CLINIC | Age: 78
End: 2018-08-09
Payer: COMMERCIAL

## 2018-08-09 VITALS
WEIGHT: 190 LBS | BODY MASS INDEX: 30.53 KG/M2 | DIASTOLIC BLOOD PRESSURE: 78 MMHG | HEART RATE: 66 BPM | HEIGHT: 66 IN | SYSTOLIC BLOOD PRESSURE: 126 MMHG

## 2018-08-09 DIAGNOSIS — I48.0 PAROXYSMAL ATRIAL FIBRILLATION (HCC): Primary | ICD-10-CM

## 2018-08-09 DIAGNOSIS — I10 BENIGN ESSENTIAL HYPERTENSION: ICD-10-CM

## 2018-08-09 PROCEDURE — 99213 OFFICE O/P EST LOW 20 MIN: CPT | Performed by: INTERNAL MEDICINE

## 2018-08-09 RX ORDER — TRIAMTERENE AND HYDROCHLOROTHIAZIDE 37.5; 25 MG/1; MG/1
TABLET ORAL AS NEEDED
COMMUNITY
Start: 2018-08-02 | End: 2018-08-10 | Stop reason: SDUPTHER

## 2018-08-09 RX ORDER — LEVOTHYROXINE SODIUM 88 UG/1
88 TABLET ORAL DAILY
Qty: 90 TABLET | Refills: 1 | Status: SHIPPED | OUTPATIENT
Start: 2018-08-09 | End: 2019-02-04 | Stop reason: SDUPTHER

## 2018-08-09 RX ORDER — TRIAMTERENE AND HYDROCHLOROTHIAZIDE 37.5; 25 MG/1; MG/1
1 TABLET ORAL DAILY
Qty: 90 TABLET | Refills: 1 | Status: SHIPPED | OUTPATIENT
Start: 2018-08-09 | End: 2020-10-23 | Stop reason: SDUPTHER

## 2018-08-09 NOTE — TELEPHONE ENCOUNTER
Med list can not be updated until prescription is approved  We can then go in and make the necessary corrections

## 2018-08-09 NOTE — TELEPHONE ENCOUNTER
Dr Feliciano Bruce you just approved the Triamterene-hydrochlororthiazide a couple of days ago for Markleton Cousin, I believe the only difference is that the pt wants the tablet, Maxide, and not the capsule

## 2018-08-09 NOTE — PROGRESS NOTES
Cardiology Follow Up    Josefa Pearson  1940  [id number]  [address] [address] [phone]    1  Paroxysmal atrial fibrillation (HCC)     2  Benign essential hypertension         Interval History:  Cardiology follow-up  Patient doing well  She does have mild dyspnea class 1 which is chronic  She has been some occasional palpitations mostly at night but the not very often  Denies any syncope or syncope  She still active, she did suffer from low back pain, there was some discomfort in the noted  Possible lumbar stenosis  She did physical therapy with some improvement in the symptoms  Patient Active Problem List   Diagnosis    Atrial fibrillation ([facility] [address] )    Benign essential hypertension    Hypothyroidism     Past Medical History:   Diagnosis Date    Arthritis     "Everywhere"OA    Atrial fibrillation ([facility] )     HL (hearing loss)     Hypokalemia     last assessed: 4/25/2015    Meniere's disease     Dx 40 yrs ago per pt  Social History     Social History    Marital status: /Civil Union     Spouse name: N/A    Number of children: N/A    Years of education: N/A     Occupational History    Not on file       Social History Main Topics    Smoking status: Former Smoker     Quit date: 1973    Smokeless tobacco: Never Used      Comment: former smoker    Alcohol use No      Comment: never drank alcohol    Drug use: No    Sexual activity: Not on file     Other Topics Concern    Not on file     Social History Narrative    No narrative on file      Family History   Problem Relation Age of Onset    Dementia Mother     Colon cancer Father      Past Surgical History:   Procedure Laterality Date    HYSTERECTOMY         Current Outpatient Prescriptions:     acetaminophen (TYLENOL) 500 mg tablet, Take 500 mg by mouth daily, Disp: , Rfl:     Ascorbic Acid (VITAMIN C) 500 MG CAPS, Take by mouth, Disp: , Rfl:     aspirin 81 MG tablet, Take 1 tablet by mouth daily, Disp: , Rfl:     Calcium Carb-Cholecalciferol (CALTRATE 600+D) 600-800 MG-UNIT TABS, Take by mouth, Disp: , Rfl:     Cholecalciferol (VITAMIN D-3) 1000 units CAPS, Take by mouth, Disp: , Rfl:     clobetasol (TEMOVATE) 0 05 % cream, Apply topically, Disp: , Rfl:     Glucosamine 750 MG TABS, Take by mouth, Disp: , Rfl:     levothyroxine 88 mcg tablet, Take 1 tablet by mouth daily, Disp: , Rfl:     Loratadine (CLARITIN) 10 MG CAPS, Take by mouth daily, Disp: , Rfl:     metoprolol tartrate (LOPRESSOR) 25 mg tablet, TAKE ONE TABLET BY MOUTH EVERY DAY, Disp: 90 tablet, Rfl: 3    Multiple Vitamins-Minerals (CENTRUM SILVER ULTRA WOMENS) TABS, Take by mouth, Disp: , Rfl:     Omega-3 Fatty Acids (FISH OIL) 645 MG CAPS, Take by mouth, Disp: , Rfl:     triamterene-hydrochlorothiazide (MAXZIDE-25) 37 5-25 mg per tablet, as needed  , Disp: , Rfl:     cyclobenzaprine (FLEXERIL) 5 mg tablet, Take 1 tablet (5 mg total) by mouth daily at bedtime for 5 days, Disp: 5 tablet, Rfl: 0  Allergies   Allergen Reactions    Dust Mite Extract     Fluticasone-Salmeterol Hives     Category: Allergy;     Latex     Molds & Smuts     Morphine Nausea Only    Penicillins Hives     Category: Allergy;        Labs:  No visits with results within 6 Month(s) from this visit     Latest known visit with results is:   Admission on 01/18/2018, Discharged on 01/18/2018   Component Date Value    WBC 01/18/2018 7 19     RBC 01/18/2018 4 42     Hemoglobin 01/18/2018 14 4     Hematocrit 01/18/2018 40 6     MCV 01/18/2018 92     MCH 01/18/2018 32 6     MCHC 01/18/2018 35 5     RDW 01/18/2018 13 5     MPV 01/18/2018 9 9     Platelets 18/50/2760 236     Neutrophils Relative 01/18/2018 62     Lymphocytes Relative 01/18/2018 30     Monocytes Relative 01/18/2018 8     Eosinophils Relative 01/18/2018 0     Basophils Relative 01/18/2018 0     Neutrophils Absolute 01/18/2018 4 42     Lymphocytes Absolute 01/18/2018 2 15     Monocytes Absolute 01/18/2018 0 58     Eosinophils Absolute 01/18/2018 0 02     Basophils Absolute 01/18/2018 0 02     Sodium 01/18/2018 141     Potassium 01/18/2018 4 1     Chloride 01/18/2018 104     CO2 01/18/2018 29     Anion Gap 01/18/2018 8     BUN 01/18/2018 17     Creatinine 01/18/2018 0 81     Glucose 01/18/2018 98     Calcium 01/18/2018 9 6     AST 01/18/2018 20     ALT 01/18/2018 30     Alkaline Phosphatase 01/18/2018 79     Total Protein 01/18/2018 7 6     Albumin 01/18/2018 4 1     Total Bilirubin 01/18/2018 0 40     eGFR 01/18/2018 69     Color, UA 01/18/2018 Yellow     Clarity, UA 01/18/2018 Clear     Specific Gravity, UA 01/18/2018 <=1 005     pH, UA 01/18/2018 6 5     Leukocytes, UA 01/18/2018 Trace*    Nitrite, UA 01/18/2018 Negative     Protein, UA 01/18/2018 Negative     Glucose, UA 01/18/2018 Negative     Ketones, UA 01/18/2018 Negative     Urobilinogen, UA 01/18/2018 0 2     Bilirubin, UA 01/18/2018 Negative     Blood, UA 01/18/2018 Negative     RBC, UA 01/18/2018 None Seen     WBC, UA 01/18/2018 0-1*    Epithelial Cells 01/18/2018 None Seen     Bacteria, UA 01/18/2018 None Seen      Imaging: No results found  Review of Systems:  Review of Systems   Constitutional: Negative for activity change and fatigue  Eyes: Negative for visual disturbance  Respiratory: Positive for shortness of breath  Negative for wheezing and stridor  Cardiovascular: Positive for palpitations  Negative for chest pain and leg swelling  Gastrointestinal: Negative for abdominal pain and blood in stool  Endocrine: Negative for cold intolerance  Musculoskeletal: Positive for arthralgias and back pain  Negative for gait problem and myalgias  Neurological: Negative for dizziness and syncope  Hematological: Does not bruise/bleed easily  Psychiatric/Behavioral: Negative for confusion  Physical Exam:  Physical Exam   Constitutional: She is oriented to person, place, and time  She appears well-developed  No distress  Eyes: No scleral icterus  Neck: No JVD present  Cardiovascular: Normal rate, regular rhythm, normal heart sounds and intact distal pulses  Exam reveals no gallop and no friction rub  No murmur heard  Pulmonary/Chest: Effort normal and breath sounds normal  No respiratory distress  She has no wheezes  She has no rales  She exhibits no tenderness  Neurological: She is alert and oriented to person, place, and time  Skin: Skin is warm and dry  She is not diaphoretic  Psychiatric: She has a normal mood and affect  Discussion/Summary:  Paroxysmal atrial fibrillation, last diagnosed over 4 years ago during the hospitalization for bronchopneumonia  She has remained clinically electrographically in sinus rhythm  Holter last year was unremarkable, she does have occasional nocturnal palpitations  Continue aspirin therapy  Continue beta-blocker catheterization almost a decade ago revealed minimal distal LAD disease  Stress test 2015 was negative ischemia  Echocardiogram revealed normal left systolic function  Will check lipid profile  Continue current medications

## 2018-08-10 ENCOUNTER — DOCUMENTATION (OUTPATIENT)
Dept: INTERNAL MEDICINE CLINIC | Facility: CLINIC | Age: 78
End: 2018-08-10

## 2018-09-12 ENCOUNTER — TELEPHONE (OUTPATIENT)
Dept: INTERNAL MEDICINE CLINIC | Facility: CLINIC | Age: 78
End: 2018-09-12

## 2018-09-13 DIAGNOSIS — Z12.39 SCREENING FOR MALIGNANT NEOPLASM OF BREAST: Primary | ICD-10-CM

## 2018-09-14 ENCOUNTER — HOSPITAL ENCOUNTER (OUTPATIENT)
Dept: RADIOLOGY | Age: 78
Discharge: HOME | End: 2018-09-14
Payer: COMMERCIAL

## 2018-09-14 DIAGNOSIS — Z12.39 SCREENING FOR MALIGNANT NEOPLASM OF BREAST: ICD-10-CM

## 2018-09-14 PROCEDURE — 77067 SCR MAMMO BI INCL CAD: CPT

## 2018-10-02 DIAGNOSIS — Z00.00 ENCOUNTER FOR GENERAL ADULT MEDICAL EXAMINATION WITHOUT ABNORMAL FINDINGS: ICD-10-CM

## 2018-10-02 DIAGNOSIS — E03.9 HYPOTHYROIDISM: ICD-10-CM

## 2018-10-25 ENCOUNTER — TELEPHONE (OUTPATIENT)
Dept: INTERNAL MEDICINE CLINIC | Facility: CLINIC | Age: 78
End: 2018-10-25

## 2018-10-26 DIAGNOSIS — Z13.6 SCREENING FOR CARDIOVASCULAR CONDITION: Primary | ICD-10-CM

## 2018-10-26 DIAGNOSIS — E03.9 HYPOTHYROIDISM, UNSPECIFIED TYPE: ICD-10-CM

## 2018-10-26 DIAGNOSIS — I10 ESSENTIAL HYPERTENSION: ICD-10-CM

## 2018-11-06 ENCOUNTER — TRANSCRIBE ORDERS (OUTPATIENT)
Dept: ADMINISTRATIVE | Age: 78
End: 2018-11-06

## 2018-11-06 ENCOUNTER — APPOINTMENT (OUTPATIENT)
Dept: LAB | Age: 78
End: 2018-11-06
Payer: COMMERCIAL

## 2018-11-06 DIAGNOSIS — I10 ESSENTIAL HYPERTENSION: ICD-10-CM

## 2018-11-06 DIAGNOSIS — I48.0 PAROXYSMAL ATRIAL FIBRILLATION (HCC): ICD-10-CM

## 2018-11-06 DIAGNOSIS — I10 BENIGN ESSENTIAL HYPERTENSION: ICD-10-CM

## 2018-11-06 DIAGNOSIS — Z13.6 SCREENING FOR CARDIOVASCULAR CONDITION: ICD-10-CM

## 2018-11-06 DIAGNOSIS — Z00.00 ENCOUNTER FOR GENERAL ADULT MEDICAL EXAMINATION WITHOUT ABNORMAL FINDINGS: ICD-10-CM

## 2018-11-06 DIAGNOSIS — E03.9 HYPOTHYROIDISM, UNSPECIFIED TYPE: ICD-10-CM

## 2018-11-06 DIAGNOSIS — E03.9 HYPOTHYROIDISM: ICD-10-CM

## 2018-11-06 LAB
ALBUMIN SERPL BCP-MCNC: 3.6 G/DL (ref 3.5–5)
ALP SERPL-CCNC: 90 U/L (ref 46–116)
ALT SERPL W P-5'-P-CCNC: 46 U/L (ref 12–78)
ANION GAP SERPL CALCULATED.3IONS-SCNC: 5 MMOL/L (ref 4–13)
AST SERPL W P-5'-P-CCNC: 29 U/L (ref 5–45)
BILIRUB SERPL-MCNC: 0.7 MG/DL (ref 0.2–1)
BUN SERPL-MCNC: 17 MG/DL (ref 5–25)
CALCIUM SERPL-MCNC: 8.8 MG/DL (ref 8.3–10.1)
CHLORIDE SERPL-SCNC: 104 MMOL/L (ref 100–108)
CHOLEST SERPL-MCNC: 168 MG/DL (ref 50–200)
CO2 SERPL-SCNC: 26 MMOL/L (ref 21–32)
CREAT SERPL-MCNC: 0.73 MG/DL (ref 0.6–1.3)
EST. AVERAGE GLUCOSE BLD GHB EST-MCNC: 114 MG/DL
GFR SERPL CREATININE-BSD FRML MDRD: 78 ML/MIN/1.73SQ M
GLUCOSE P FAST SERPL-MCNC: 101 MG/DL (ref 65–99)
HBA1C MFR BLD: 5.6 % (ref 4.2–6.3)
HDLC SERPL-MCNC: 57 MG/DL (ref 40–60)
LDLC SERPL CALC-MCNC: 91 MG/DL (ref 0–100)
POTASSIUM SERPL-SCNC: 4.1 MMOL/L (ref 3.5–5.3)
PROT SERPL-MCNC: 7.4 G/DL (ref 6.4–8.2)
SODIUM SERPL-SCNC: 135 MMOL/L (ref 136–145)
TRIGL SERPL-MCNC: 100 MG/DL
TSH SERPL DL<=0.05 MIU/L-ACNC: 1.75 UIU/ML (ref 0.36–3.74)

## 2018-11-06 PROCEDURE — 80061 LIPID PANEL: CPT

## 2018-11-06 PROCEDURE — 83036 HEMOGLOBIN GLYCOSYLATED A1C: CPT

## 2018-11-06 PROCEDURE — 84443 ASSAY THYROID STIM HORMONE: CPT

## 2018-11-06 PROCEDURE — 80053 COMPREHEN METABOLIC PANEL: CPT

## 2018-11-06 PROCEDURE — 36415 COLL VENOUS BLD VENIPUNCTURE: CPT

## 2018-11-09 ENCOUNTER — OFFICE VISIT (OUTPATIENT)
Dept: INTERNAL MEDICINE CLINIC | Facility: CLINIC | Age: 78
End: 2018-11-09
Payer: COMMERCIAL

## 2018-11-09 VITALS
SYSTOLIC BLOOD PRESSURE: 112 MMHG | HEIGHT: 65 IN | RESPIRATION RATE: 16 BRPM | DIASTOLIC BLOOD PRESSURE: 74 MMHG | BODY MASS INDEX: 32.09 KG/M2 | HEART RATE: 62 BPM | OXYGEN SATURATION: 98 % | WEIGHT: 192.6 LBS

## 2018-11-09 DIAGNOSIS — E03.9 HYPOTHYROIDISM, UNSPECIFIED TYPE: Primary | ICD-10-CM

## 2018-11-09 DIAGNOSIS — Z00.00 MEDICARE ANNUAL WELLNESS VISIT, SUBSEQUENT: ICD-10-CM

## 2018-11-09 DIAGNOSIS — Z13.1 SCREENING FOR DIABETES MELLITUS: ICD-10-CM

## 2018-11-09 DIAGNOSIS — Z13.6 SCREENING FOR CARDIOVASCULAR CONDITION: ICD-10-CM

## 2018-11-09 PROCEDURE — 4040F PNEUMOC VAC/ADMIN/RCVD: CPT | Performed by: INTERNAL MEDICINE

## 2018-11-09 PROCEDURE — 3008F BODY MASS INDEX DOCD: CPT | Performed by: INTERNAL MEDICINE

## 2018-11-09 PROCEDURE — 1036F TOBACCO NON-USER: CPT | Performed by: INTERNAL MEDICINE

## 2018-11-09 PROCEDURE — 1125F AMNT PAIN NOTED PAIN PRSNT: CPT | Performed by: INTERNAL MEDICINE

## 2018-11-09 PROCEDURE — G0439 PPPS, SUBSEQ VISIT: HCPCS | Performed by: INTERNAL MEDICINE

## 2018-11-09 PROCEDURE — 1170F FXNL STATUS ASSESSED: CPT | Performed by: INTERNAL MEDICINE

## 2018-11-09 NOTE — PROGRESS NOTES
Assessment and Plan:    Problem List Items Addressed This Visit        Endocrine    Hypothyroidism - Primary    Relevant Orders    Comprehensive metabolic panel    TSH, 3rd generation       Other    Medicare annual wellness visit, subsequent     Assessment and plan 1  Medicare annual  annual wellness examination overall the patient is clinically stable and doing well, we encouraged the patient to follow a healthy and balanced diet  We recommend that the patient exercise routinely approximately 30 minutes 5 times per week   We have reviewed the patient's vaccines and have made recommendations for updates if necessary flu shot is up-to-date, shingles vaccine when available       We will be ordering screening laboratories which are age appropriate  Return to the office in  12 months    call if any problems  Other Visit Diagnoses     Screening for cardiovascular condition        Relevant Orders    Lipid Panel with Direct LDL reflex    Hemoglobin A1C    Screening for diabetes mellitus        Relevant Orders    Hemoglobin A1C        Health Maintenance Due   Topic Date Due    SLP PLAN OF CARE  1940    Pneumococcal PPSV23/PCV13 65+ Years / Low and Medium Risk (2 of 2 - PPSV23) 08/18/2016     Piriformis syndrome after traveling to [facility] she had been in the car for 12 hours and along with a lot more walking she noticed pain that shot down her right leg no incontinence  Initially was a a 10 but now is down to 3 she is using over-the-counter topical Tylenol and will be starting her stretching he will she learned in physical therapy she would like to work on it from a conservative approach but if her symptoms are not improving within a month she will let me know for further help in treatment  HPI:  Krystina Raza is a [de-identified] y o  female here for her Subsequent Wellness Visit      Patient Active Problem List   Diagnosis    Atrial fibrillation ([facility] [address] )    Benign essential hypertension    Hypothyroidism    Medicare annual wellness visit, subsequent     Past Medical History:   Diagnosis Date    Arthritis     "Everywhere"OA    Atrial fibrillation ([address] )     HL (hearing loss)     Hypokalemia     last assessed: 4/25/2015    Meniere's disease     Dx 40 yrs ago per pt  Past Surgical History:   Procedure Laterality Date    HYSTERECTOMY       Family History   Problem Relation Age of Onset    Dementia Mother     Colon cancer Father      History   Smoking Status    Former Smoker    Quit date: 1973   Smokeless Tobacco    Never Used     Comment: former smoker     History   Alcohol Use No     Comment: never drank alcohol      History   Drug Use No       Current Outpatient Prescriptions   Medication Sig Dispense Refill    acetaminophen (TYLENOL) 500 mg tablet Take 500 mg by mouth daily      Ascorbic Acid (VITAMIN C) 500 MG CAPS Take by mouth      aspirin 81 MG tablet Take 1 tablet by mouth daily      Calcium Carb-Cholecalciferol (CALTRATE 600+D) 600-800 MG-UNIT TABS Take by mouth      Cholecalciferol (VITAMIN D-3) 1000 units CAPS Take by mouth      clobetasol (TEMOVATE) 0 05 % cream Apply topically      Glucosamine 750 MG TABS Take by mouth      levothyroxine 88 mcg tablet Take 1 tablet (88 mcg total) by mouth daily 90 tablet 1    Loratadine (CLARITIN) 10 MG CAPS Take by mouth daily      metoprolol tartrate (LOPRESSOR) 25 mg tablet TAKE ONE TABLET BY MOUTH EVERY DAY 90 tablet 3    Multiple Vitamins-Minerals (CENTRUM SILVER ULTRA WOMENS) TABS Take by mouth      Omega-3 Fatty Acids (FISH OIL) 645 MG CAPS Take by mouth      triamterene-hydrochlorothiazide (MAXZIDE-25) 37 5-25 mg per tablet Take 1 tablet by mouth daily 90 tablet 1    cyclobenzaprine (FLEXERIL) 5 mg tablet Take 1 tablet (5 mg total) by mouth daily at bedtime for 5 days 5 tablet 0     No current facility-administered medications for this visit        Allergies   Allergen Reactions    Dust Mite Extract     Fluticasone-Salmeterol Hives Category: Allergy;     Latex     Molds & Smuts     Morphine Nausea Only    Penicillins Hives     Category: Allergy;     Pollen Extract Allergic Rhinitis, Cough, Dermatitis and Itching     Immunization History   Administered Date(s) Administered     Influenza (IM) Preservative Free 09/23/2016    Influenza Split High Dose Preservative Free IM 08/17/2015    Influenza TIV (IM) 11/26/2012, 09/23/2015    Pneumococcal Conjugate 13-Valent 08/18/2015    Pneumococcal Polysaccharide PPV23 07/22/2003    Td (adult), adsorbed 07/22/2005    Zoster 02/25/2013    influenza, trivalent, adjuvanted 10/05/2018       Patient Care Team:  Cesar De La Cruz DO as PCP - MD Caitlin Falcon MD    Medicare Screening Tests and Risk Assessments: [facility] is here for her Subsequent Wellness visit  Health Risk Assessment:  Patient rates overall health as good  Patient feels that their physical health rating is Same  Eyesight was rated as Slightly worse  Hearing was rated as Same  Patient feels that their emotional and mental health rating is Same  Pain experienced by patient in the last 7 days has been None  Patient states that she has experienced no weight loss or gain in last 6 months  (Additional comments: See optho Dr Braxton Cowden needs cataract surg in near future)    Emotional/Mental Health:  Patient has been feeling nervous/anxious  PHQ-9 Depression Screening:    Frequency of the following problems over the past two weeks:      1  Little interest or pleasure in doing things: 0 - not at all      2  Feeling down, depressed, or hopeless: 0 - not at all  PHQ-2 Score: 0          Broken Bones/Falls: Fall Risk Assessment:    In the past year, patient has experienced: No history of falling in past year          Bladder/Bowel:  Patient has not leaked urine accidently in the last six months  Patient reports no loss of bowel control      Immunizations:  Patient has had a flu vaccination within the last year  Patient has received a pneumonia shot  Patient has received a shingles shot  Patient has received tetanus/diphtheria shot  Home Safety:  Patient does not have trouble with stairs inside or outside of their home  Patient currently reports that there are no safety hazards present in home, working smoke alarms, no working carbon monoxide detectors  Preventative Screenings:   Breast cancer screening performed, colon cancer screen completed, cholesterol screen completed, glaucoma eye exam completed,     Nutrition:  Current diet: Regular with servings of the following:    Medications:  Patient is currently taking over-the-counter supplements  List of OTC medications includes: centrum mvi,  fish oil, vit d 3 1,000 I  U  once a day, mg 200mg/day  Patient is able to manage medications  Lifestyle Choices:  Patient reports no tobacco use  Patient has not smoked or used tobacco in the past   Patient reports alcohol use  Alcohol use per week: 1 wine Q3-4 week  Patient drives a vehicle  Patient wears seat belt  Current level of exercise of physical activity described by patient as: ymca 2-3 times per week  Activities of Daily Living:  Can get out of bed by his or her self, able to dress self, able to make own meals, able to do own shopping, able to bathe self, can do own laundry/housekeeping, can manage own money, pay bills and track expenses    Previous Hospitalizations:  No hospitalization or ED visit in past 12 months        Advanced Directives:  Patient has not decided on power of   Patient has not completed advanced directive    Additional Comments: She will consider    Preventative Screening/Counseling:      Cardiovascular:      Counseling: Healthy Diet and Healthy Weight          Diabetes:      Counseling: Healthy Diet, Healthy Weight and Improve Physical Activity          Colorectal Cancer:      General: Screening Current          Breast Cancer:      General: Screening Current          Cervical Cancer:      General: Screening Not Indicated          Osteoporosis:      General: Screening Current          AAA:      General: Screening Not Indicated          Glaucoma:      General: Screening Current          HIV:      General: Screening Not Indicated          Hepatitis C:      General: Screening Not Indicated        Advanced Directives:   Patient has no living will for healthcare, Information on ACP and/or AD provided        Renal rate rhythm no murmurs gallops or ectopy, clear to auscultation bilaterally without wheezes rales rhonchi, soft nontender no guarding no rigidity no rebound no edema distal pulses 2/2

## 2018-11-11 NOTE — ASSESSMENT & PLAN NOTE
Assessment and plan 1  Medicare annual  annual wellness examination overall the patient is clinically stable and doing well, we encouraged the patient to follow a healthy and balanced diet  We recommend that the patient exercise routinely approximately 30 minutes 5 times per week   We have reviewed the patient's vaccines and have made recommendations for updates if necessary flu shot is up-to-date, shingles vaccine when available       We will be ordering screening laboratories which are age appropriate  Return to the office in  12 months    call if any problems

## 2019-02-04 DIAGNOSIS — E03.9 HYPOTHYROIDISM, UNSPECIFIED TYPE: ICD-10-CM

## 2019-02-05 RX ORDER — LEVOTHYROXINE SODIUM 88 UG/1
TABLET ORAL
Qty: 90 TABLET | Refills: 1 | Status: SHIPPED | OUTPATIENT
Start: 2019-02-05 | End: 2019-08-05 | Stop reason: SDUPTHER

## 2019-04-03 ENCOUNTER — CONSULT (OUTPATIENT)
Dept: INTERNAL MEDICINE CLINIC | Facility: CLINIC | Age: 79
End: 2019-04-03
Payer: COMMERCIAL

## 2019-04-03 VITALS
OXYGEN SATURATION: 98 % | DIASTOLIC BLOOD PRESSURE: 64 MMHG | RESPIRATION RATE: 16 BRPM | SYSTOLIC BLOOD PRESSURE: 146 MMHG | TEMPERATURE: 98.9 F | HEIGHT: 65 IN | HEART RATE: 65 BPM | WEIGHT: 190.6 LBS | BODY MASS INDEX: 31.75 KG/M2

## 2019-04-03 DIAGNOSIS — H25.9 AGE-RELATED CATARACT OF BOTH EYES, UNSPECIFIED AGE-RELATED CATARACT TYPE: ICD-10-CM

## 2019-04-03 DIAGNOSIS — Z01.818 PREOPERATIVE CLEARANCE: Primary | ICD-10-CM

## 2019-04-03 PROCEDURE — 99214 OFFICE O/P EST MOD 30 MIN: CPT | Performed by: NURSE PRACTITIONER

## 2019-04-03 PROCEDURE — 93000 ELECTROCARDIOGRAM COMPLETE: CPT | Performed by: NURSE PRACTITIONER

## 2019-05-08 ENCOUNTER — OFFICE VISIT (OUTPATIENT)
Dept: DERMATOLOGY | Facility: CLINIC | Age: 79
End: 2019-05-08
Payer: COMMERCIAL

## 2019-05-08 VITALS — WEIGHT: 185 LBS | BODY MASS INDEX: 29.73 KG/M2 | HEIGHT: 66 IN | TEMPERATURE: 98.7 F

## 2019-05-08 DIAGNOSIS — D23.9 DERMATOFIBROMA: ICD-10-CM

## 2019-05-08 DIAGNOSIS — D48.5 NEOPLASM OF UNCERTAIN BEHAVIOR OF SKIN: Primary | ICD-10-CM

## 2019-05-08 DIAGNOSIS — L82.1 SEBORRHEIC KERATOSIS: ICD-10-CM

## 2019-05-08 PROCEDURE — 88305 TISSUE EXAM BY PATHOLOGIST: CPT | Performed by: PATHOLOGY

## 2019-05-08 PROCEDURE — 99204 OFFICE O/P NEW MOD 45 MIN: CPT | Performed by: DERMATOLOGY

## 2019-05-08 PROCEDURE — 11102 TANGNTL BX SKIN SINGLE LES: CPT | Performed by: DERMATOLOGY

## 2019-05-21 ENCOUNTER — DOCUMENTATION (OUTPATIENT)
Dept: DERMATOLOGY | Facility: CLINIC | Age: 79
End: 2019-05-21

## 2019-05-22 ENCOUNTER — OFFICE VISIT (OUTPATIENT)
Dept: DERMATOLOGY | Facility: CLINIC | Age: 79
End: 2019-05-22
Payer: COMMERCIAL

## 2019-05-22 VITALS — HEIGHT: 66 IN | WEIGHT: 195 LBS | BODY MASS INDEX: 31.34 KG/M2 | TEMPERATURE: 98.2 F

## 2019-05-22 DIAGNOSIS — D09.9 SQUAMOUS CELL CARCINOMA IN SITU: Primary | ICD-10-CM

## 2019-05-22 PROCEDURE — 17260 DSTRJ MAL LES T/A/L 0.5 CM/<: CPT | Performed by: DERMATOLOGY

## 2019-05-22 PROCEDURE — 17280 DSTR MAL LS F/E/E/N/L/M .5/<: CPT | Performed by: DERMATOLOGY

## 2019-05-22 PROCEDURE — 99213 OFFICE O/P EST LOW 20 MIN: CPT | Performed by: DERMATOLOGY

## 2019-05-22 RX ORDER — FLUOROURACIL 50 MG/G
CREAM TOPICAL
Qty: 40 G | Refills: 0 | Status: SHIPPED | OUTPATIENT
Start: 2019-05-22 | End: 2022-05-13 | Stop reason: ALTCHOICE

## 2019-06-12 ENCOUNTER — OFFICE VISIT (OUTPATIENT)
Dept: DERMATOLOGY | Facility: CLINIC | Age: 79
End: 2019-06-12
Payer: COMMERCIAL

## 2019-06-12 VITALS — BODY MASS INDEX: 31.34 KG/M2 | HEIGHT: 66 IN | WEIGHT: 195 LBS | TEMPERATURE: 97.6 F

## 2019-06-12 DIAGNOSIS — D04.39 SQUAMOUS CELL CARCINOMA IN SITU (SCCIS) OF SKIN OF NOSE: Primary | ICD-10-CM

## 2019-06-12 DIAGNOSIS — D04.71 SQUAMOUS CELL CARCINOMA IN SITU (SCCIS) OF SKIN OF RIGHT LOWER LEG: ICD-10-CM

## 2019-06-12 DIAGNOSIS — L82.1 SEBORRHEIC KERATOSIS: ICD-10-CM

## 2019-06-12 PROCEDURE — 99214 OFFICE O/P EST MOD 30 MIN: CPT | Performed by: DERMATOLOGY

## 2019-06-19 DIAGNOSIS — I10 ESSENTIAL HYPERTENSION: ICD-10-CM

## 2019-08-05 DIAGNOSIS — E03.9 HYPOTHYROIDISM, UNSPECIFIED TYPE: ICD-10-CM

## 2019-08-05 RX ORDER — LEVOTHYROXINE SODIUM 88 UG/1
TABLET ORAL
Qty: 90 TABLET | Refills: 1 | Status: SHIPPED | OUTPATIENT
Start: 2019-08-05 | End: 2020-01-29

## 2019-08-07 ENCOUNTER — OFFICE VISIT (OUTPATIENT)
Dept: DERMATOLOGY | Facility: CLINIC | Age: 79
End: 2019-08-07
Payer: COMMERCIAL

## 2019-08-07 VITALS — BODY MASS INDEX: 28.93 KG/M2 | HEIGHT: 66 IN | TEMPERATURE: 97.6 F | WEIGHT: 180 LBS

## 2019-08-07 DIAGNOSIS — D04.39 SQUAMOUS CELL CARCINOMA IN SITU (SCCIS) OF SKIN OF NOSE: Primary | ICD-10-CM

## 2019-08-07 DIAGNOSIS — D04.71 SQUAMOUS CELL CARCINOMA IN SITU (SCCIS) OF SKIN OF RIGHT LOWER LEG: ICD-10-CM

## 2019-08-07 PROCEDURE — 99213 OFFICE O/P EST LOW 20 MIN: CPT | Performed by: DERMATOLOGY

## 2019-08-07 RX ORDER — LORATADINE AND PSEUDOEPHEDRINE 10; 240 MG/1; MG/1
1 TABLET, EXTENDED RELEASE ORAL DAILY
COMMUNITY

## 2019-08-07 NOTE — PROGRESS NOTES
[address] [facility] Note     Patient Name: Antonia Hurt  Encounter Date: 08/07/2019    Today's Chief Concerns:  Yenifer Brewer Concern #1:  F/U SCCIS S/P Fluorouracil Cream    Past Medical History:  Have you ever had or currently have any of the following medical conditions or treatments? · HIV/AIDS: No  · Hepatitis B: No  · Hepatitis C: No   · Diabetes: No  · Tuberculosis: No  · Biologic Therapy/Chemotherapy: No  · Organ or Bone Marrow Transplantation: No  · Radiation Treatment: No  · Cancer (If Yes, which types)- No      Have you ever had any of the following skin conditions? · Melanoma? (If Yes, please provide more detail)- No  · Basal Cell Carcinoma: No  · Squamous Cell Carcinoma: No  · Sebaceous Cell Carcinoma: No  · Merkel Cell Carcinoma: No  · Angiosarcoma: No  · Blistering Sunburns: No  · Eczema: No  · Psoriasis: No    Social History:    What is your current Smoking Status? Former smoker     What is/was your primary occupation? Retired    What are your hobbies/past-times? Walking     Family history:  Do any of your "first degree relatives" (parent, brother, sister, or child) have any of the following conditions? · Melanoma? (If Yes, which relatives?) No  · Eczema: No  · Asthma: No  · Hay Fever/Seasonal Allergies: No  · Psoriasis: No  · Arthritis: YES  · Thyroid Problems: No  · Lupus/Connective Tissue Disease: No  · Diabetes: No  · Stroke: No  · Blood Clots: No  · IBD/Crohn's/Ulcerative Colitis: No  · Vitiligo: No  · Scarring/Keloids: No  · Severe Acne: No  · Pancreatic Cancer: No  · Other known Skin Condition? If Yes, what condition and which relatives?   No    Current Medications:    Current Outpatient Medications:     acetaminophen (TYLENOL) 500 mg tablet, Take 500 mg by mouth daily, Disp: , Rfl:     Ascorbic Acid (VITAMIN C) 500 MG CAPS, Take by mouth, Disp: , Rfl:     aspirin 81 MG tablet, Take 1 tablet by mouth daily, Disp: , Rfl:     Calcium Carb-Cholecalciferol (CALTRATE 600+D) 600-800 MG-UNIT TABS, Take by mouth, Disp: , Rfl:     Cholecalciferol (VITAMIN D-3) 1000 units CAPS, Take by mouth, Disp: , Rfl:     fluorouracil (EFUDEX) 5 % cream, Apply topically twice a day for 4 weeks straight, Disp: 40 g, Rfl: 0    Glucosamine 750 MG TABS, Take by mouth, Disp: , Rfl:     levothyroxine 88 mcg tablet, TAKE 1 TABLET BY MOUTH ONCE DAILY, Disp: 90 tablet, Rfl: 1    Loratadine (CLARITIN) 10 MG CAPS, Take by mouth daily, Disp: , Rfl:     loratadine-pseudoephedrine (CLARITIN-D 24-HOUR)  mg per 24 hr tablet, Take 1 tablet by mouth daily, Disp: , Rfl:     metoprolol tartrate (LOPRESSOR) 25 mg tablet, TAKE ONE TABLET BY MOUTH EVERY DAY, Disp: 90 tablet, Rfl: 3    Multiple Vitamins-Minerals (CENTRUM SILVER ULTRA WOMENS) TABS, Take by mouth, Disp: , Rfl:     Omega-3 Fatty Acids (FISH OIL) 645 MG CAPS, Take by mouth, Disp: , Rfl:     triamterene-hydrochlorothiazide (MAXZIDE-25) 37 5-25 mg per tablet, Take 1 tablet by mouth daily, Disp: 90 tablet, Rfl: 1    cyclobenzaprine (FLEXERIL) 5 mg tablet, Take 1 tablet (5 mg total) by mouth daily at bedtime for 5 days (Patient not taking: Reported on 4/3/2019), Disp: 5 tablet, Rfl: 0    Specific Alerts:    Are you pregnant or planning to become pregnant? No    Are you currently or planning to be nursing or breast feeding? N/A    Allergies   Allergen Reactions    Dust Mite Extract     Fluticasone-Salmeterol Hives     Category: Allergy;     Latex     Molds & Smuts     Morphine Nausea Only    Penicillins Hives     Category: Allergy;     Pollen Extract Allergic Rhinitis, Cough, Dermatitis and Itching       May we call your Preferred Phone number to discuss your specific medical information? YES    May we leave a detailed message that includes your specific medical information? YES    Have you traveled outside of the [facility] in the past 3 months? No    Do you currently have a pacemaker or defibrillator?  No    Do you have any artificial heart valves, joints, plates, screws, rods, stents, pins, etc? No   - If Yes, were any placed within the last 2 years? Do you require any medications prior to a surgical procedure? No   - If Yes, for which procedure? n/a   - If Yes, what medications to you require? n/a    Are you taking any medications that cause you to bleed more easily ("blood thinners") No    Have you ever experienced a rapid heartbeat with epinephrine? No    Have you ever been treated with "gold" (gold sodium thiomalate) therapy? No    [facility] can help with wrinkles, "laugh lines," facial volume loss, "double chin," "love handles," age spots, and more  Are you interested in learning today about some of the skin enhancement procedures that we offer? (If Yes, please provide more detail) No    Review of Systems:  Have you recently had or currently have any of the following? · Fever or chills: No  · Night Sweats: No  · Headaches: No  · Weight Gain: No  · Weight Loss: No  · Blurry Vision: No  · Nausea: No  · Vomiting: No  · Diarrhea: No  · Blood in Stool: No  · Abdominal Pain: No  · Itchy Skin: YES  · Painful Joints: No  · Swollen Joints: No  · Muscle Pain: No  · Irregular Mole: No  · Sun Burn: YES  · Dry Skin: YES  · Skin Color Changes: No  · Scar or Keloid: No  · Cold Sores/Fever Blisters: No  · Bacterial Infections/MRSA: No  · Anxiety: No  · Depression: No  · Suicidal or Homicidal Thoughts: No      PHYSICAL EXAM:      Was a chaperone (Derm Clinical Assistant) present for the entirety of the Physical Exam? YES    Did the Dermatology Team specifically ask and  the patient on the importance of a Full Skin Exam to be sure that nothing is missed clinically?  YES    Did the patient request or accept a Full Skin Exam?  No    Did the patient specifically refuse to have the areas "under-the-bra" examined by the Dermatologist? No    Did the patient specifically refuse to have the areas "under-the-underwear" examined by the Dermatologist? No      CONSTITUTIONAL:   Vitals:    08/07/19 1004   Temp: 97 6 °F (36 4 °C)   TempSrc: Tympanic   Weight: 81 6 kg (180 lb)   Height: 5' 6" (1 676 m)       PSYCH: Normal mood and affect  EYES: Normal conjunctiva  ENT: Normal lips and oral mucosa  CARDIOVASCULAR: No edema  RESPIRATORY: Normal respirations  HEME/LYMPH/IMMUNO:  No regional lymphadenopathy except as noted below in ASSESSMENT AND PLAN BY DIAGNOSIS    FULL ORGAN SYSTEM SKIN EXAM (SKIN)  Hair, Scalp, Ears, Face Normal except as noted below in Assessment   Neck, Cervical Chain Nodes Normal except as noted below in Assessment   Right Arm/Hand/Fingers    Left Arm/Hand/Fingers    Chest/Breasts/Axillae    Abdomen, Umbilicus    Back/Spine    Groin/Genitalia/Buttocks    Right Leg, Foot, Toes Normal except as noted below in Assessment   Left Leg, Foot, Toes         ASSESSMENT AND PLAN BY DIAGNOSIS:        1  Squamous cell carcinoma in situ     Physical Exam:  · Anatomic Location Affected:  Left nasal tip  · Morphological Description: clear no sign of recurrence  · Pertinent Positives:  · Pertinent Negatives:No signs of infection     Additional History of Present Condition:       Assessment and Plan:  Based on a thorough discussion of this condition and the management approach to it (including a comprehensive discussion of the known risks, side effects and potential benefits of treatment), the patient (family) agrees to implement the following specific plan:  · Healed no sign of recurrence  Observe only and use sunscreens  ·    2   Squamous cell carcinoma in situ     Physical Exam:  · Anatomic Location Affected:  Right upper anterior shin  · Morphological Description: pink smooth macule with small central crust  · Pertinent Positives:  · Pertinent Negatives:No signs of infection     Additional History of Present Condition:      Assessment and Plan:  Based on a thorough discussion of this condition and the management approach to it (including a comprehensive discussion of the known risks, side effects and potential benefits of treatment), the patient (family) agrees to implement the following specific plan:  Observe for complete resolution  Call if  Not 100% resolved  · Pink smooth maacule    Tiny scab central  I asked her to advise if central crust not completely resolved by one month           Scribe Attestation    I,:   Rosa Sibley MA am acting as a scribe while in the presence of the attending physician :        I,:   Agapito Umanzor MD personally performed the services described in this documentation    as scribed in my presence :

## 2019-08-21 ENCOUNTER — OFFICE VISIT (OUTPATIENT)
Dept: CARDIOLOGY CLINIC | Facility: CLINIC | Age: 79
End: 2019-08-21
Payer: COMMERCIAL

## 2019-08-21 VITALS
BODY MASS INDEX: 29.25 KG/M2 | SYSTOLIC BLOOD PRESSURE: 128 MMHG | WEIGHT: 182 LBS | HEART RATE: 66 BPM | HEIGHT: 66 IN | DIASTOLIC BLOOD PRESSURE: 78 MMHG

## 2019-08-21 DIAGNOSIS — I10 BENIGN ESSENTIAL HYPERTENSION: ICD-10-CM

## 2019-08-21 DIAGNOSIS — I48.0 PAROXYSMAL ATRIAL FIBRILLATION (HCC): Primary | ICD-10-CM

## 2019-08-21 PROCEDURE — 99213 OFFICE O/P EST LOW 20 MIN: CPT | Performed by: INTERNAL MEDICINE

## 2019-08-21 PROCEDURE — 3074F SYST BP LT 130 MM HG: CPT | Performed by: INTERNAL MEDICINE

## 2019-08-21 RX ORDER — POLYMYXIN B SULFATE AND TRIMETHOPRIM 1; 10000 MG/ML; [USP'U]/ML
SOLUTION OPHTHALMIC
COMMUNITY
Start: 2019-08-13 | End: 2020-10-07

## 2019-08-21 RX ORDER — PREDNISOLONE ACETATE 10 MG/ML
SUSPENSION/ DROPS OPHTHALMIC
COMMUNITY
Start: 2019-08-13 | End: 2020-10-07

## 2019-08-21 NOTE — PROGRESS NOTES
Cardiology Follow Up    Drake Hamm  1940  [id number]  [address]  [address] [address]  [phone]    1  Paroxysmal atrial fibrillation (HCC)  Holter monitor - 24 hour   2  Benign essential hypertension         Interval History:   Cardiology follow-up  Patient doing overall well, rare palpitations  Active, no exertional symptoms no chest pain or dyspnea  No orthopnea no PND  Denies any focal neurological deficits denies any amaurosis fugax  Compliant with low-sodium diet, blood pressures been well control  Patient Active Problem List   Diagnosis    [facility]    Benign essential hypertension    Hypothyroidism    Medicare annual wellness visit, subsequent    Preoperative clearance    Age-related cataract of both eyes     Past Medical History:   Diagnosis Date    Arthritis     "Everywhere"OA    [facility]     HL (hearing loss)     Hypokalemia     last assessed: 2015    Meniere's disease     Dx 40 yrs ago per pt      Squamous cell skin cancer     (SCCIS)     Social History     Socioeconomic History    Marital status: /Civil Union     Spouse name: Not on file    Number of children: Not on file    Years of education: Not on file    Highest education level: Not on file   Occupational History    Not on file   Social Needs    Financial resource strain: Not on file    Food insecurity:     Worry: Not on file     Inability: Not on file    Transportation needs:     Medical: Not on file     Non-medical: Not on file   Tobacco Use    Smoking status: Former Smoker     Last attempt to quit: 1973     Years since quittin 6    Smokeless tobacco: Never Used    Tobacco comment: former smoker   Substance and Sexual Activity    Alcohol use: No     Comment: never drank alcohol    Drug use: No    Sexual activity: Not on file   Lifestyle    Physical activity:     Days per week: Not on file     Minutes per session: Not on file    Stress: Not on file   Relationships    Social connections:     Talks on phone: Not on file     Gets together: Not on file     Attends Jain service: Not on file     Active member of club or organization: Not on file     Attends meetings of clubs or organizations: Not on file     Relationship status: Not on file    Intimate partner violence:     Fear of current or ex partner: Not on file     Emotionally abused: Not on file     Physically abused: Not on file     Forced sexual activity: Not on file   Other Topics Concern    Not on file   Social History Narrative    Not on file      Family History   Problem Relation Age of Onset    Dementia Mother     Colon cancer Father      Past Surgical History:   Procedure Laterality Date    HYSTERECTOMY         Current Outpatient Medications:     acetaminophen (TYLENOL) 500 mg tablet, Take 500 mg by mouth daily, Disp: , Rfl:     Ascorbic Acid (VITAMIN C) 500 MG CAPS, Take by mouth, Disp: , Rfl:     aspirin 81 MG tablet, Take 1 tablet by mouth daily, Disp: , Rfl:     Calcium Carb-Cholecalciferol (CALTRATE 600+D) 600-800 MG-UNIT TABS, Take by mouth, Disp: , Rfl:     Cholecalciferol (VITAMIN D-3) 1000 units CAPS, Take by mouth, Disp: , Rfl:     fluorouracil (EFUDEX) 5 % cream, Apply topically twice a day for 4 weeks straight, Disp: 40 g, Rfl: 0    Glucosamine 750 MG TABS, Take by mouth, Disp: , Rfl:     levothyroxine 88 mcg tablet, TAKE 1 TABLET BY MOUTH ONCE DAILY, Disp: 90 tablet, Rfl: 1    Loratadine (CLARITIN) 10 MG CAPS, Take by mouth daily, Disp: , Rfl:     loratadine-pseudoephedrine (CLARITIN-D 24-HOUR)  mg per 24 hr tablet, Take 1 tablet by mouth daily, Disp: , Rfl:     metoprolol tartrate (LOPRESSOR) 25 mg tablet, TAKE ONE TABLET BY MOUTH EVERY DAY, Disp: 90 tablet, Rfl: 3    Multiple Vitamins-Minerals (CENTRUM SILVER ULTRA WOMENS) TABS, Take by mouth, Disp: , Rfl:     Omega-3 Fatty Acids (FISH OIL) 645 MG CAPS, Take by mouth, Disp: , Rfl:     polymyxin b-trimethoprim (POLYTRIM) ophthalmic solution, , Disp: , Rfl:     prednisoLONE acetate (PRED FORTE) 1 % ophthalmic suspension, , Disp: , Rfl:     triamterene-hydrochlorothiazide (MAXZIDE-25) 37 5-25 mg per tablet, Take 1 tablet by mouth daily, Disp: 90 tablet, Rfl: 1    cyclobenzaprine (FLEXERIL) 5 mg tablet, Take 1 tablet (5 mg total) by mouth daily at bedtime for 5 days (Patient not taking: Reported on 4/3/2019), Disp: 5 tablet, Rfl: 0  Allergies   Allergen Reactions    Dust Mite Extract     Fluticasone-Salmeterol Hives     Category: Allergy;     Latex     Molds & Smuts     Morphine Nausea Only    Penicillins Hives     Category: Allergy;     Pollen Extract Allergic Rhinitis, Cough, Dermatitis and Itching       Labs:  Office Visit on 05/08/2019   Component Date Value    Case Report 05/08/2019                      Value:Surgical Pathology Report                         Case: [id number]                                   Authorizing Provider:  Marquita Thornton MD          Collected:           05/08/2019 1629              Ordering Location:     [facility]      Received:            05/08/2019 [email or web address]                                                                Pathologist:           Susie Carrillo MD                                                             Specimens:   A) - Skin, Other, Skin, Left tip of nose                                                            B) - Skin, Other, Skin, Right upper anterior shin                                          Final Diagnosis 05/08/2019                      Value: This result contains rich text formatting which cannot be displayed here   Additional Information 05/08/2019                      Value: This result contains rich text formatting which cannot be displayed here     Rojelio Lee Description 05/08/2019 Value:This result contains rich text formatting which cannot be displayed here   Clinical Information 05/08/2019                      Value:Specimen A: skin; shave biopsy; Left tip of nose; [de-identified]year old female with a 0 6 cm pink crusted lucent macule; Differential diagnosis: actinic keratosis versus basal cell carcinoma  Specimen B:skin; shave biopsy; right anterior shin; [de-identified]years old female with a 1 0 cm crust verrucous nodule; Differential diagnosis:squamous cell versus seborrheic keratosis  Imaging: No results found  Review of Systems:  Review of Systems   Constitutional: Negative for activity change and fatigue  Eyes: Negative for visual disturbance  Respiratory: Negative for apnea, shortness of breath, wheezing and stridor  Cardiovascular: Positive for palpitations  Negative for chest pain and leg swelling  Neurological: Negative for dizziness, syncope, facial asymmetry, speech difficulty, light-headedness and numbness  Hematological: Does not bruise/bleed easily  Psychiatric/Behavioral: Negative for sleep disturbance  Physical Exam:  Physical Exam   Constitutional: No distress  Neck: No JVD present  Cardiovascular: Normal rate, regular rhythm, normal heart sounds and intact distal pulses  Exam reveals no gallop and no friction rub  No murmur heard  Occasional extra systoles   Pulmonary/Chest: Effort normal and breath sounds normal  No stridor  No respiratory distress  She has no wheezes  She has no rales  Musculoskeletal: She exhibits no edema  Neurological: She is alert  Skin: Skin is warm  Capillary refill takes less than 2 seconds  She is not diaphoretic  Psychiatric: She has a normal mood and affect  Discussion/Summary:  Paroxysmal atrial fibrillation, diagnosed 2014 in the setting of concomitant pneumonia hospitalized for that    She has had no clinical recurrences, remains clinically electrographically in sinus rhythm, will do surveillance Holter  Holter last year was unremarkable  Stress test 2015 was negative for ischemia left systolic function is normal   Continue low-dose beta-blocker plus aspirin  This note was completed in part utilizing [email or web address] fluency direct voice recognition software  Grammatical errors, random word insertion, spelling mistakes, and incomplete sentences may be an occasional consequence of the system secondary to software limitations, ambient noise and hardware issues  At the time of dictation, efforts were made to edit, clarify and /or correct errors  Please read the chart carefully and recognize, using context, where substitutions have occurred  If you have any questions or concerns about the context, text or information contained within the body of this dictation, please contact myself, the provider, for further clarification

## 2019-08-27 ENCOUNTER — CONSULT (OUTPATIENT)
Dept: INTERNAL MEDICINE CLINIC | Facility: CLINIC | Age: 79
End: 2019-08-27
Payer: COMMERCIAL

## 2019-08-27 VITALS
OXYGEN SATURATION: 97 % | BODY MASS INDEX: 29.41 KG/M2 | RESPIRATION RATE: 16 BRPM | WEIGHT: 183 LBS | SYSTOLIC BLOOD PRESSURE: 120 MMHG | HEIGHT: 66 IN | DIASTOLIC BLOOD PRESSURE: 78 MMHG | HEART RATE: 63 BPM

## 2019-08-27 DIAGNOSIS — Z01.818 PREOP EXAMINATION: Primary | ICD-10-CM

## 2019-08-27 PROBLEM — H25.9 AGE-RELATED CATARACT: Status: ACTIVE | Noted: 2019-08-27

## 2019-08-27 PROCEDURE — 99213 OFFICE O/P EST LOW 20 MIN: CPT | Performed by: INTERNAL MEDICINE

## 2019-08-27 NOTE — PROGRESS NOTES
Assessment/Plan:    Preop examination  Preop evaluation requested by Ophthalmology Dr Reta Romo patient is clinically stable doing very well for this low risk procedure she is cleared for the procedure, we have completed necessary paperwork I will be sending this back to Ophthalmology RTO as scheduled call if any problems  Problem List Items Addressed This Visit        Other    Preop examination - Primary     Preop evaluation requested by Ophthalmology Dr Reta Romo patient is clinically stable doing very well for this low risk procedure she is cleared for the procedure, we have completed necessary paperwork I will be sending this back to Ophthalmology RTO as scheduled call if any problems  Subjective:      Patient ID: Abilio Gutierres is a 78 y o  female  HPI preop evaluation regarding cataract repair requested by Cardiology;-cardiology stable checking on the Holter monitor she has a known history of AFib  No history of the stroke clot, obsessive bleeding  She has had a previous right eye cataract repair she does report me the anesthesia did wear off the last 5 minutes of the procedure she will be bring the seek attention of the anesthesiologist so he can do a better job for next treatment  Currently she is asymptomatic  He does have this procedure scheduled at surgery [facility] for the 16th of September, the surgeon will be Dr Jordan Mitchell    The following portions of the patient's history were reviewed and updated as appropriate: allergies, current medications, past family history, past medical history, past social history, past surgical history and problem list     Review of Systems   Constitutional: Negative for activity change, appetite change and unexpected weight change  HENT: Negative for congestion and postnasal drip  Eyes: Positive for visual disturbance  Respiratory: Negative for cough and shortness of breath  Cardiovascular: Negative for chest pain  Gastrointestinal: Negative for abdominal pain, diarrhea, nausea and vomiting  Neurological: Negative for dizziness, light-headedness and headaches  Hematological: Negative for adenopathy  Objective:    No follow-ups on file  No results found  Recent Results (from the past 56691 hour(s))   POCT ECG    Impression    Sinus lizzie   Holter monitor - 24 hour    Narrative    PT NAME: Ky Harris  : 1940  AGE: 64 y o  GENDER: female  MRN: [id number]   PROCEDURE: Holter monitor - 24 hour    INDICATIONS: Paroxysmal atrial fibrillation    DESCRIPTION OF FINDINGS:  The patient was monitored for a total of 24 hours  The patient was in   sinus rhythm throughout the study  The average heart rate was 66 beats per   minute  The heart rate ranged from 50 to 117 beats per minute  Ventricular ectopic activity consisted of 425 beats (0 4% of total beats)  There was no sustained or nonsustained ventricular tachycardia  Supraventricular ectopic activity consisted of 521 beats (0 6% of total   beats)  There were 2, very brief runs of SVT  The longest of these was 7   beats  There was no evidence of atrial fibrillation or atrial flutter  There were no significant pauses  The longest R-R interval was 1 6   seconds  There was no evidence of advanced degree heart block  The accompanying patient diary notes no symptoms  Impression    1  Unremarkable 24 hour Holter monitor for age     -----  Alison Mantel Concha Hashimoto, MD, [facility] [place]       Allergies   Allergen Reactions    Dust Mite Extract     Fluticasone-Salmeterol Hives     Category: Allergy;     Latex     Molds & Smuts     Morphine Nausea Only    Penicillins Hives     Category:  Allergy;     Pollen Extract Allergic Rhinitis, Cough, Dermatitis and Itching       Past Medical History:   Diagnosis Date    Arthritis     "Everywhere"OA    Atrial fibrillation (HCC)     HL (hearing loss)     Hypokalemia     last assessed: 2015    Meniere's disease     Dx 40 yrs ago per pt   Squamous cell skin cancer     (SCCIS)     Past Surgical History:   Procedure Laterality Date    HYSTERECTOMY       Current Outpatient Medications on File Prior to Visit   Medication Sig Dispense Refill    acetaminophen (TYLENOL) 500 mg tablet Take 500 mg by mouth daily      Ascorbic Acid (VITAMIN C) 500 MG CAPS Take by mouth      aspirin 81 MG tablet Take 1 tablet by mouth daily      Calcium Carb-Cholecalciferol (CALTRATE 600+D) 600-800 MG-UNIT TABS Take by mouth      Cholecalciferol (VITAMIN D-3) 1000 units CAPS Take by mouth      fluorouracil (EFUDEX) 5 % cream Apply topically twice a day for 4 weeks straight 40 g 0    Glucosamine 750 MG TABS Take by mouth      levothyroxine 88 mcg tablet TAKE 1 TABLET BY MOUTH ONCE DAILY 90 tablet 1    Loratadine (CLARITIN) 10 MG CAPS Take by mouth daily      loratadine-pseudoephedrine (CLARITIN-D 24-HOUR)  mg per 24 hr tablet Take 1 tablet by mouth daily      metoprolol tartrate (LOPRESSOR) 25 mg tablet TAKE ONE TABLET BY MOUTH EVERY DAY 90 tablet 3    Multiple Vitamins-Minerals (CENTRUM SILVER ULTRA WOMENS) TABS Take by mouth      Omega-3 Fatty Acids (FISH OIL) 645 MG CAPS Take by mouth      polymyxin b-trimethoprim (POLYTRIM) ophthalmic solution       prednisoLONE acetate (PRED FORTE) 1 % ophthalmic suspension       triamterene-hydrochlorothiazide (MAXZIDE-25) 37 5-25 mg per tablet Take 1 tablet by mouth daily 90 tablet 1    cyclobenzaprine (FLEXERIL) 5 mg tablet Take 1 tablet (5 mg total) by mouth daily at bedtime for 5 days (Patient not taking: Reported on 4/3/2019) 5 tablet 0     No current facility-administered medications on file prior to visit        Family History   Problem Relation Age of Onset    Dementia Mother     Colon cancer Father      Social History     Socioeconomic History    Marital status: /Civil Union     Spouse name: Not on file    Number of children: Not on file    Years of education: Not on file  Highest education level: Not on file   Occupational History    Not on file   Social Needs    Financial resource strain: Not on file    Food insecurity:     Worry: Not on file     Inability: Not on file    Transportation needs:     Medical: Not on file     Non-medical: Not on file   Tobacco Use    Smoking status: Former Smoker     Last attempt to quit: 1973     Years since quittin 6    Smokeless tobacco: Never Used    Tobacco comment: former smoker   Substance and Sexual Activity    Alcohol use: No     Comment: never drank alcohol    Drug use: No    Sexual activity: Not on file   Lifestyle    Physical activity:     Days per week: Not on file     Minutes per session: Not on file    Stress: Not on file   Relationships    Social connections:     Talks on phone: Not on file     Gets together: Not on file     Attends Mosque service: Not on file     Active member of club or organization: Not on file     Attends meetings of clubs or organizations: Not on file     Relationship status: Not on file    Intimate partner violence:     Fear of current or ex partner: Not on file     Emotionally abused: Not on file     Physically abused: Not on file     Forced sexual activity: Not on file   Other Topics Concern    Not on file   Social History Narrative    Not on file     Vitals:    19 1512 19 1516   BP: 120/82 120/78   Pulse: 63    Resp: 16    SpO2: 97%    Weight: 83 kg (183 lb)    Height: 5' 6" (1 676 m)      Results for orders placed or performed in visit on 19   Tissue Exam   Result Value Ref Range    Case Report       Surgical Pathology Report                         Case: [id number]                                   Authorizing Provider:  Redd Serrano MD          Collected:           2019 1629              Ordering Location:     [facility]      Received:            2019 [address] [facility] Pathologist:           Irma Atwood MD                                                             Specimens:   A) - Skin, Other, Skin, Left tip of nose                                                            B) - Skin, Other, Skin, Right upper anterior shin                                          Final Diagnosis       A  Skin, Left tip of nose, shave biopsy:  - 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 proliferative actinic keratosis     with adnexal extension and present at peripheral border and base of biopsy  -- Few detached atypical squamous nests cannot exclude superficial invasion from sectioning        artifact  B  Skin, Right upper anterior shin, shave biopsy:  - Squamous cell carcinoma in-situ, present at peripheral border and base of biopsy   - No invasive carcinoma identified  Interpretation performed at [place] [address]  Additional Information       All controls performed with the immunohistochemical stains reported above reacted appropriately  These tests were developed and their performance characteristics determined by [facility] or [facility]  They may not be cleared or approved by the [place]  Food and Drug Administration  The FDA has determined that such clearance or approval is not necessary  These tests are used for clinical purposes  They should not be regarded as investigational or for research  This laboratory has been approved by CLIA 88, designated as a high-complexity laboratory and is qualified to perform these tests  Gross Description       A  The specimen is received in formalin, labeled with the patient's name and hospital number, and is designated "skin shave left tip of nose  The specimen consists of a tan-white skin shave measuring 0 5 x 0 4 by less than 0 1 cm   1 surface is inked green and the opposite surface is inked red  Bisected and entirely submitted between sponges in 1 cassette  B  The specimen is received in formalin, labeled with the patient's name and hospital number, and is designated "skin shave right upper anterior shin  The specimen consists of a tan skin shave measuring 0 8 x 0 4 x 0 1 cm  The epithelial surface exhibits a brown papule measuring 0 3 x 0 3 x 0 2 cm which abuts the nearest peripheral margin  The apparent margin of resection is inked green  Bisected lengthwise and entirely submitted between sponges in 1 cassette  Note: The estimated total formalin fixation time based upon information provided by the submitting clinician and the standard processing schedule is under 72 hours  [facility]      Clinical Information       Specimen A: skin; shave biopsy; Left tip of nose; [de-identified]year old female with a 0 6 cm pink crusted lucent macule; Differential diagnosis: actinic keratosis versus basal cell carcinoma  Specimen B:skin; shave biopsy; right anterior shin; [de-identified]years old female with a 1 0 cm crust verrucous nodule; Differential diagnosis:squamous cell versus seborrheic keratosis  Weight (last 2 days)     Date/Time   Weight    08/27/19 1512   83 (183)            Body mass index is 29 54 kg/m²  BP      Temp      Pulse     Resp      SpO2        Vitals:    08/27/19 1512   Weight: 83 kg (183 lb)     Vitals:    08/27/19 1512   Weight: 83 kg (183 lb)       /78   Pulse 63   Resp 16   Ht 5' 6" (1 676 m)   Wt 83 kg (183 lb)   SpO2 97%   BMI 29 54 kg/m²          Physical Exam   Constitutional: She appears well-developed and well-nourished  HENT:   Head: Normocephalic  Mouth/Throat: Oropharynx is clear and moist    Eyes: Pupils are equal, round, and reactive to light  Conjunctivae are normal  Right eye exhibits no discharge  Left eye exhibits no discharge  No scleral icterus  Neck: Neck supple  Cardiovascular: Normal rate, regular rhythm, normal heart sounds and intact distal pulses   Exam reveals no gallop and no friction rub  No murmur heard  Pulmonary/Chest: Breath sounds normal  No respiratory distress  She has no wheezes  She has no rales  Abdominal: Soft  Bowel sounds are normal  She exhibits no distension and no mass  There is no tenderness  There is no rebound and no guarding  Musculoskeletal: She exhibits no edema or deformity  Lymphadenopathy:     She has no cervical adenopathy  Neurological: She is alert   Coordination normal      cataract

## 2019-08-28 NOTE — ASSESSMENT & PLAN NOTE
Preop evaluation requested by Ophthalmology Dr Cayla Davis patient is clinically stable doing very well for this low risk procedure she is cleared for the procedure, we have completed necessary paperwork I will be sending this back to Ophthalmology RTO as scheduled call if any problems

## 2019-09-05 ENCOUNTER — HOSPITAL ENCOUNTER (OUTPATIENT)
Dept: NON INVASIVE DIAGNOSTICS | Facility: CLINIC | Age: 79
Discharge: HOME | End: 2019-09-05
Payer: COMMERCIAL

## 2019-09-05 DIAGNOSIS — I48.0 PAROXYSMAL ATRIAL FIBRILLATION (HCC): ICD-10-CM

## 2019-09-05 PROCEDURE — 93226 XTRNL ECG REC<48 HR SCAN A/R: CPT

## 2019-09-05 PROCEDURE — 93225 XTRNL ECG REC<48 HRS REC: CPT

## 2019-09-10 PROCEDURE — 93227 XTRNL ECG REC<48 HR R&I: CPT | Performed by: INTERNAL MEDICINE

## 2019-09-20 ENCOUNTER — HOSPITAL ENCOUNTER (OUTPATIENT)
Dept: RADIOLOGY | Age: 79
Discharge: HOME | End: 2019-09-20
Payer: COMMERCIAL

## 2019-09-20 VITALS — BODY MASS INDEX: 29.99 KG/M2 | HEIGHT: 65 IN | WEIGHT: 180 LBS

## 2019-09-20 DIAGNOSIS — Z12.31 ENCOUNTER FOR SCREENING MAMMOGRAM FOR MALIGNANT NEOPLASM OF BREAST: ICD-10-CM

## 2019-09-20 PROCEDURE — 77067 SCR MAMMO BI INCL CAD: CPT

## 2019-11-04 ENCOUNTER — CLINICAL SUPPORT (OUTPATIENT)
Dept: INTERNAL MEDICINE CLINIC | Facility: CLINIC | Age: 79
End: 2019-11-04
Payer: COMMERCIAL

## 2019-11-04 DIAGNOSIS — Z23 NEED FOR INFLUENZA VACCINATION: Primary | ICD-10-CM

## 2019-11-04 PROCEDURE — 90662 IIV NO PRSV INCREASED AG IM: CPT

## 2019-11-04 PROCEDURE — G0008 ADMIN INFLUENZA VIRUS VAC: HCPCS

## 2019-11-05 ENCOUNTER — APPOINTMENT (OUTPATIENT)
Dept: LAB | Age: 79
End: 2019-11-05
Payer: COMMERCIAL

## 2019-11-05 DIAGNOSIS — E03.9 HYPOTHYROIDISM, UNSPECIFIED TYPE: ICD-10-CM

## 2019-11-05 DIAGNOSIS — Z13.1 SCREENING FOR DIABETES MELLITUS: ICD-10-CM

## 2019-11-05 DIAGNOSIS — Z13.6 SCREENING FOR CARDIOVASCULAR CONDITION: ICD-10-CM

## 2019-11-05 LAB
ALBUMIN SERPL BCP-MCNC: 3.7 G/DL (ref 3.5–5)
ALP SERPL-CCNC: 88 U/L (ref 46–116)
ALT SERPL W P-5'-P-CCNC: 23 U/L (ref 12–78)
ANION GAP SERPL CALCULATED.3IONS-SCNC: 5 MMOL/L (ref 4–13)
AST SERPL W P-5'-P-CCNC: 21 U/L (ref 5–45)
BILIRUB SERPL-MCNC: 0.51 MG/DL (ref 0.2–1)
BUN SERPL-MCNC: 12 MG/DL (ref 5–25)
CALCIUM SERPL-MCNC: 9 MG/DL (ref 8.3–10.1)
CHLORIDE SERPL-SCNC: 107 MMOL/L (ref 100–108)
CHOLEST SERPL-MCNC: 164 MG/DL (ref 50–200)
CO2 SERPL-SCNC: 28 MMOL/L (ref 21–32)
CREAT SERPL-MCNC: 0.69 MG/DL (ref 0.6–1.3)
EST. AVERAGE GLUCOSE BLD GHB EST-MCNC: 114 MG/DL
GFR SERPL CREATININE-BSD FRML MDRD: 82 ML/MIN/1.73SQ M
GLUCOSE P FAST SERPL-MCNC: 99 MG/DL (ref 65–99)
HBA1C MFR BLD: 5.6 % (ref 4.2–6.3)
HDLC SERPL-MCNC: 69 MG/DL
LDLC SERPL CALC-MCNC: 84 MG/DL (ref 0–100)
POTASSIUM SERPL-SCNC: 4.5 MMOL/L (ref 3.5–5.3)
PROT SERPL-MCNC: 6.9 G/DL (ref 6.4–8.2)
SODIUM SERPL-SCNC: 140 MMOL/L (ref 136–145)
TRIGL SERPL-MCNC: 54 MG/DL
TSH SERPL DL<=0.05 MIU/L-ACNC: 2.49 UIU/ML (ref 0.36–3.74)

## 2019-11-05 PROCEDURE — 80053 COMPREHEN METABOLIC PANEL: CPT

## 2019-11-05 PROCEDURE — 36415 COLL VENOUS BLD VENIPUNCTURE: CPT

## 2019-11-05 PROCEDURE — 80061 LIPID PANEL: CPT

## 2019-11-05 PROCEDURE — 83036 HEMOGLOBIN GLYCOSYLATED A1C: CPT

## 2019-11-05 PROCEDURE — 84443 ASSAY THYROID STIM HORMONE: CPT

## 2019-11-12 ENCOUNTER — OFFICE VISIT (OUTPATIENT)
Dept: INTERNAL MEDICINE CLINIC | Facility: CLINIC | Age: 79
End: 2019-11-12
Payer: COMMERCIAL

## 2019-11-12 VITALS
HEART RATE: 58 BPM | OXYGEN SATURATION: 97 % | BODY MASS INDEX: 30.59 KG/M2 | WEIGHT: 183.6 LBS | RESPIRATION RATE: 16 BRPM | DIASTOLIC BLOOD PRESSURE: 68 MMHG | SYSTOLIC BLOOD PRESSURE: 124 MMHG | HEIGHT: 65 IN

## 2019-11-12 DIAGNOSIS — I10 ESSENTIAL HYPERTENSION: Primary | ICD-10-CM

## 2019-11-12 DIAGNOSIS — Z00.00 MEDICARE ANNUAL WELLNESS VISIT, SUBSEQUENT: ICD-10-CM

## 2019-11-12 DIAGNOSIS — I10 BENIGN ESSENTIAL HYPERTENSION: ICD-10-CM

## 2019-11-12 DIAGNOSIS — Z13.1 SCREENING FOR DIABETES MELLITUS: ICD-10-CM

## 2019-11-12 DIAGNOSIS — E03.9 HYPOTHYROIDISM, UNSPECIFIED TYPE: ICD-10-CM

## 2019-11-12 DIAGNOSIS — Z13.820 SCREENING FOR OSTEOPOROSIS: ICD-10-CM

## 2019-11-12 DIAGNOSIS — I48.0 PAROXYSMAL ATRIAL FIBRILLATION (HCC): ICD-10-CM

## 2019-11-12 DIAGNOSIS — E66.09 CLASS 1 OBESITY DUE TO EXCESS CALORIES WITHOUT SERIOUS COMORBIDITY WITH BODY MASS INDEX (BMI) OF 30.0 TO 30.9 IN ADULT: ICD-10-CM

## 2019-11-12 PROBLEM — H81.09 MENIERE'S DISEASE: Status: ACTIVE | Noted: 2019-11-12

## 2019-11-12 PROCEDURE — 1036F TOBACCO NON-USER: CPT | Performed by: INTERNAL MEDICINE

## 2019-11-12 PROCEDURE — 1160F RVW MEDS BY RX/DR IN RCRD: CPT | Performed by: INTERNAL MEDICINE

## 2019-11-12 PROCEDURE — 3725F SCREEN DEPRESSION PERFORMED: CPT | Performed by: INTERNAL MEDICINE

## 2019-11-12 PROCEDURE — G0439 PPPS, SUBSEQ VISIT: HCPCS | Performed by: INTERNAL MEDICINE

## 2019-11-12 PROCEDURE — 99214 OFFICE O/P EST MOD 30 MIN: CPT | Performed by: INTERNAL MEDICINE

## 2019-11-12 PROCEDURE — 1101F PT FALLS ASSESS-DOCD LE1/YR: CPT | Performed by: INTERNAL MEDICINE

## 2019-11-12 NOTE — PATIENT INSTRUCTIONS
Medicare Preventive Visit Patient Instructions  Thank you for completing your Welcome to Medicare Visit or Medicare Annual Wellness Visit today  Your next wellness visit will be due in one year (11/12/2020)  The screening/preventive services that you may require over the next 5-10 years are detailed below  Some tests may not apply to you based off risk factors and/or age  Screening tests ordered at today's visit but not completed yet may show as past due  Also, please note that scanned in results may not display below  Preventive Screenings:  Service Recommendations Previous Testing/Comments   Colorectal Cancer Screening  * Colonoscopy    * Fecal Occult Blood Test (FOBT)/Fecal Immunochemical Test (FIT)  * Fecal DNA/Cologuard Test  * Flexible Sigmoidoscopy Age: 54-65 years old   Colonoscopy: every 10 years (may be performed more frequently if at higher risk)  OR  FOBT/FIT: every 1 year  OR  Cologuard: every 3 years  OR  Sigmoidoscopy: every 5 years  Screening may be recommended earlier than age 48 if at higher risk for colorectal cancer  Also, an individualized decision between you and your healthcare provider will decide whether screening between the ages of 74-80 would be appropriate  Colonoscopy: 05/10/2018  FOBT/FIT: Not on file  Cologuard: Not on file  Sigmoidoscopy: Not on file    Screening Current     Breast Cancer Screening Age: 36 years old  Frequency: every 1-2 years  Not required if history of left and right mastectomy Mammogram: 09/20/2019    Screening Current   Cervical Cancer Screening Between the ages of 21-29, pap smear recommended once every 3 years  Between the ages of 33-67, can perform pap smear with HPV co-testing every 5 years     Recommendations may differ for women with a history of total hysterectomy, cervical cancer, or abnormal pap smears in past  Pap Smear: Not on file    Screening Not Indicated   Hepatitis C Screening Once for adults born between 1945 and 1965  More frequently in patients at high risk for Hepatitis C Hep C Antibody: Not on file       Diabetes Screening 1-2 times per year if you're at risk for diabetes or have pre-diabetes Fasting glucose: 99 mg/dL   A1C: 5 6 %    Screening Current   Cholesterol Screening Once every 5 years if you don't have a lipid disorder  May order more often based on risk factors  Lipid panel: 11/05/2019    Screening Current     Other Preventive Screenings Covered by Medicare:  1  Abdominal Aortic Aneurysm (AAA) Screening: covered once if your at risk  You're considered to be at risk if you have a family history of AAA  2  Lung Cancer Screening: covers low dose CT scan once per year if you meet all of the following conditions: (1) Age 50-69; (2) No signs or symptoms of lung cancer; (3) Current smoker or have quit smoking within the last 15 years; (4) You have a tobacco smoking history of at least 30 pack years (packs per day multiplied by number of years you smoked); (5) You get a written order from a healthcare provider  3  Glaucoma Screening: covered annually if you're considered high risk: (1) You have diabetes OR (2) Family history of glaucoma OR (3)  aged 48 and older OR (3)  American aged 72 and older  3  Osteoporosis Screening: covered every 2 years if you meet one of the following conditions: (1) You're estrogen deficient and at risk for osteoporosis based off medical history and other findings; (2) Have a vertebral abnormality; (3) On glucocorticoid therapy for more than 3 months; (4) Have primary hyperparathyroidism; (5) On osteoporosis medications and need to assess response to drug therapy  · Last bone density test (DXA Scan): 09/11/2015  5  HIV Screening: covered annually if you're between the age of 12-76  Also covered annually if you are younger than 13 and older than 72 with risk factors for HIV infection  For pregnant patients, it is covered up to 3 times per pregnancy      Immunizations:  Immunization Recommendations   Influenza Vaccine Annual influenza vaccination during flu season is recommended for all persons aged >= 6 months who do not have contraindications   Pneumococcal Vaccine (Prevnar and Pneumovax)  * Prevnar = PCV13  * Pneumovax = PPSV23   Adults 25-60 years old: 1-3 doses may be recommended based on certain risk factors  Adults 72 years old: Prevnar (PCV13) vaccine recommended followed by Pneumovax (PPSV23) vaccine  If already received PPSV23 since turning 65, then PCV13 recommended at least one year after PPSV23 dose  Hepatitis B Vaccine 3 dose series if at intermediate or high risk (ex: diabetes, end stage renal disease, liver disease)   Tetanus (Td) Vaccine - COST NOT COVERED BY MEDICARE PART B Following completion of primary series, a booster dose should be given every 10 years to maintain immunity against tetanus  Td may also be given as tetanus wound prophylaxis  Tdap Vaccine - COST NOT COVERED BY MEDICARE PART B Recommended at least once for all adults  For pregnant patients, recommended with each pregnancy  Shingles Vaccine (Shingrix) - COST NOT COVERED BY MEDICARE PART B  2 shot series recommended in those aged 48 and above     Health Maintenance Due:      Topic Date Due    CRC Screening: Colonoscopy  05/10/2023     Immunizations Due:      Topic Date Due    Pneumococcal Vaccine: 65+ Years (2 of 2 - PPSV23) 08/18/2016     Advance Directives   What are advance directives? Advance directives are legal documents that state your wishes and plans for medical care  These plans are made ahead of time in case you lose your ability to make decisions for yourself  Advance directives can apply to any medical decision, such as the treatments you want, and if you want to donate organs  What are the types of advance directives? There are many types of advance directives, and each state has rules about how to use them  You may choose a combination of any of the following:  · Living will:   This is a written record of the treatment you want  You can also choose which treatments you do not want, which to limit, and which to stop at a certain time  This includes surgery, medicine, IV fluid, and tube feedings  · Durable power of  for healthcare [place] [facility]): This is a written record that states who you want to make healthcare choices for you when you are unable to make them for yourself  This person, called a proxy, is usually a family member or a friend  You may choose more than 1 proxy  · Do not resuscitate (DNR) order:  A DNR order is used in case your heart stops beating or you stop breathing  It is a request not to have certain forms of treatment, such as CPR  A DNR order may be included in other types of advance directives  · Medical directive: This covers the care that you want if you are in a coma, near death, or unable to make decisions for yourself  You can list the treatments you want for each condition  Treatment may include pain medicine, surgery, blood transfusions, dialysis, IV or tube feedings, and a ventilator (breathing machine)  · Values history: This document has questions about your views, beliefs, and how you feel and think about life  This information can help others choose the care that you would choose  Why are advance directives important? An advance directive helps you control your care  Although spoken wishes may be used, it is better to have your wishes written down  Spoken wishes can be misunderstood, or not followed  Treatments may be given even if you do not want them  An advance directive may make it easier for your family to make difficult choices about your care  Weight Management   Why it is important to manage your weight:  Being overweight increases your risk of health conditions such as heart disease, high blood pressure, type 2 diabetes, and certain types of cancer   It can also increase your risk for osteoarthritis, sleep apnea, and other respiratory problems  Aim for a slow, steady weight loss  Even a small amount of weight loss can lower your risk of health problems  How to lose weight safely:  A safe and healthy way to lose weight is to eat fewer calories and get regular exercise  You can lose up about 1 pound a week by decreasing the number of calories you eat by 500 calories each day  Healthy meal plan for weight management:  A healthy meal plan includes a variety of foods, contains fewer calories, and helps you stay healthy  A healthy meal plan includes the following:  · Eat whole-grain foods more often  A healthy meal plan should contain fiber  Fiber is the part of grains, fruits, and vegetables that is not broken down by your body  Whole-grain foods are healthy and provide extra fiber in your diet  Some examples of whole-grain foods are whole-wheat breads and pastas, oatmeal, brown rice, and bulgur  · Eat a variety of vegetables every day  Include dark, leafy greens such as spinach, kale, mary jane greens, and mustard greens  Eat yellow and orange vegetables such as carrots, sweet potatoes, and winter squash  · Eat a variety of fruits every day  Choose fresh or canned fruit (canned in its own juice or light syrup) instead of juice  Fruit juice has very little or no fiber  · Eat low-fat dairy foods  Drink fat-free (skim) milk or 1% milk  Eat fat-free yogurt and low-fat cottage cheese  Try low-fat cheeses such as mozzarella and other reduced-fat cheeses  · Choose meat and other protein foods that are low in fat  Choose beans or other legumes such as split peas or lentils  Choose fish, skinless poultry (chicken or turkey), or lean cuts of red meat (beef or pork)  Before you cook meat or poultry, cut off any visible fat  · Use less fat and oil  Try baking foods instead of frying them  Add less fat, such as margarine, sour cream, regular salad dressing and mayonnaise to foods  Eat fewer high-fat foods   Some examples of high-fat foods include french fries, doughnuts, ice cream, and cakes  · Eat fewer sweets  Limit foods and drinks that are high in sugar  This includes candy, cookies, regular soda, and sweetened drinks  Exercise:  Exercise at least 30 minutes per day on most days of the week  Some examples of exercise include walking, biking, dancing, and swimming  You can also fit in more physical activity by taking the stairs instead of the elevator or parking farther away from stores  Ask your healthcare provider about the best exercise plan for you  © Copyright [email or web address] 2018 Information is for End User's use only and may not be sold, redistributed or otherwise used for commercial purposes   All illustrations and images included in CareNotes® are the copyrighted property of [employer]  or [address] [place] [address]

## 2019-11-12 NOTE — PROGRESS NOTES
Assessment and Plan:     Problem List Items Addressed This Visit        Endocrine    Hypothyroidism    Relevant Orders    TSH, 3rd generation       Cardiovascular and Mediastinum    Atrial fibrillation ([address] )    Benign essential hypertension    Relevant Orders    Comprehensive metabolic panel    Lipid Panel with Direct LDL reflex    Essential hypertension - Primary    Relevant Orders    Hemoglobin A1C       Other    Medicare annual wellness visit, subsequent     Assessment and plan 1  Medicare subsequent annual wellness examination overall the patient is clinically stable and doing well, we encouraged the patient to follow a healthy and balanced diet  We recommend that the patient exercise routinely approximately 30 minutes 5 times per week   We have reviewed the patient's vaccines and have made recommendations for updates if necessary annual flu vaccine recommended and also consider the new shingles vaccine       We will be ordering screening laboratories which are age appropriate  Return to the office in  12 months    call if any problems  Screening for diabetes mellitus    Screening for osteoporosis    Relevant Orders    DXA bone density spine hip and pelvis      Other Visit Diagnoses     Class 1 obesity due to excess calories without serious comorbidity with body mass index (BMI) of 30 0 to 30 9 in adult               Preventive health issues were discussed with patient, and age appropriate screening tests were ordered as noted in patient's After Visit Summary  Personalized health advice and appropriate referrals for health education or preventive services given if needed, as noted in patient's After Visit Summary       History of Present Illness:     Patient presents for Medicare Annual Wellness visit    Patient Care Team:  Gladys Jeffers DO as PCP - Mitzie Goltz, MD Odetta Poor, MD     Problem List:     Patient Active Problem List   Diagnosis    [facility]rial [facility]    Benign essential hypertension    Hypothyroidism    Medicare annual wellness visit, subsequent    Preoperative clearance    Age-related cataract of both eyes    Preop examination    Age-related cataract    Essential hypertension    Screening for diabetes mellitus    Screening for osteoporosis      Past Medical and Surgical History:     Past Medical History:   Diagnosis Date    Arthritis     "Everywhere"OA    Atrial [facility] )     HL (hearing loss)     Hypokalemia     last assessed: 2015    Meniere's disease     Dx 40 yrs ago per pt      Squamous cell skin cancer     (SCCIS)     Past Surgical History:   Procedure Laterality Date    HYSTERECTOMY      partial  age 30      Family History:     Family History   Problem Relation Age of Onset    Dementia Mother     Colon cancer Father 80    No Known Problems Sister     No Known Problems Daughter     No Known Problems Daughter     No Known Problems Maternal Aunt     No Known Problems Paternal Aunt     No Known Problems Paternal Aunt     No Known Problems Paternal Aunt     Brain cancer Half-Sister       Social History:     Social History     Socioeconomic History    Marital status: /Civil Union     Spouse name: None    Number of children: None    Years of education: None    Highest education level: None   Occupational History    None   Social Needs    Financial resource strain: None    Food insecurity:     Worry: None     Inability: None    Transportation needs:     Medical: None     Non-medical: None   Tobacco Use    Smoking status: Former Smoker     Last attempt to quit:      Years since quittin 8    Smokeless tobacco: Never Used    Tobacco comment: former smoker   Substance and Sexual Activity    Alcohol use: No     Comment: never drank alcohol    Drug use: No    Sexual activity: None   Lifestyle    Physical activity:     Days per week: None     Minutes per session: None    Stress: None   Relationships    Social connections:     Talks on phone: None     Gets together: None     Attends Bahai service: None     Active member of club or organization: None     Attends meetings of clubs or organizations: None     Relationship status: None    Intimate partner violence:     Fear of current or ex partner: None     Emotionally abused: None     Physically abused: None     Forced sexual activity: None   Other Topics Concern    None   Social History Narrative    None       Medications and Allergies:     Current Outpatient Medications   Medication Sig Dispense Refill    acetaminophen (TYLENOL) 500 mg tablet Take 500 mg by mouth daily      Ascorbic Acid (VITAMIN C) 500 MG CAPS Take by mouth      aspirin 81 MG tablet Take 1 tablet by mouth daily      Calcium Carb-Cholecalciferol (CALTRATE 600+D) 600-800 MG-UNIT TABS Take by mouth      Cholecalciferol (VITAMIN D-3) 1000 units CAPS Take by mouth      fluorouracil (EFUDEX) 5 % cream Apply topically twice a day for 4 weeks straight 40 g 0    Glucosamine 750 MG TABS Take by mouth      levothyroxine 88 mcg tablet TAKE 1 TABLET BY MOUTH ONCE DAILY 90 tablet 1    Loratadine (CLARITIN) 10 MG CAPS Take by mouth daily      loratadine-pseudoephedrine (CLARITIN-D 24-HOUR)  mg per 24 hr tablet Take 1 tablet by mouth daily      metoprolol tartrate (LOPRESSOR) 25 mg tablet TAKE ONE TABLET BY MOUTH EVERY DAY 90 tablet 3    Multiple Vitamins-Minerals (CENTRUM SILVER ULTRA WOMENS) TABS Take by mouth      Omega-3 Fatty Acids (FISH OIL) 645 MG CAPS Take by mouth      polymyxin b-trimethoprim (POLYTRIM) ophthalmic solution       prednisoLONE acetate (PRED FORTE) 1 % ophthalmic suspension       triamterene-hydrochlorothiazide (MAXZIDE-25) 37 5-25 mg per tablet Take 1 tablet by mouth daily 90 tablet 1    cyclobenzaprine (FLEXERIL) 5 mg tablet Take 1 tablet (5 mg total) by mouth daily at bedtime for 5 days (Patient not taking: Reported on 4/3/2019) 5 tablet 0     No current facility-administered medications for this visit  Allergies   Allergen Reactions    Dust Mite Extract     Fluticasone-Salmeterol Hives     Category: Allergy;     Latex     Molds & Smuts     Morphine Nausea Only    Penicillins Hives     Category: Allergy;     Pollen Extract Allergic Rhinitis, Cough, Dermatitis and Itching      Immunizations:     Immunization History   Administered Date(s) Administered     Influenza (IM) Preservative Free 09/23/2016    INFLUENZA 10/05/2018    Influenza Split High Dose Preservative Free IM 08/17/2015    Influenza TIV (IM) 11/26/2012, 09/23/2015    Influenza, high dose seasonal 0 5 mL 11/04/2019    Pneumococcal Conjugate 13-Valent 08/18/2015    Pneumococcal Polysaccharide PPV23 07/22/2003    Td (adult), adsorbed 07/22/2005    Zoster 02/25/2013    influenza, trivalent, adjuvanted 10/05/2018      Health Maintenance:         Topic Date Due    CRC Screening: Colonoscopy  05/10/2023         Topic Date Due    Pneumococcal Vaccine: 65+ Years (2 of 2 - PPSV23) 08/18/2016      Medicare Health Risk Assessment:     /68   Pulse 58   Resp 16   Ht 5' 5" (1 651 m)   Wt 83 3 kg (183 lb 9 6 oz)   SpO2 97%   BMI 30 55 kg/m²      [facility] is here for her Subsequent Wellness visit  Health Risk Assessment:   Patient rates overall health as good  Patient feels that their physical health rating is same  Eyesight was rated as slightly worse  Hearing was rated as same  Patient feels that their emotional and mental health rating is same  Pain experienced in the last 7 days has been none  Patient states that she has experienced no weight loss or gain in last 6 months  Just saw Eye doctor - Left eye inflammation    Depression Screening:   PHQ-2 Score: 0      Fall Risk Screening:    In the past year, patient has experienced: no history of falling in past year      Urinary Incontinence Screening:   Patient has not leaked urine accidently in the last six months  Home Safety:  Patient does not have trouble with stairs inside or outside of their home  Patient has working smoke alarms and has working carbon monoxide detector  Home safety hazards include: none  Nutrition:   Current diet is Regular  Medications:   Patient is currently taking over-the-counter supplements  OTC medications include: see medication list  Patient is able to manage medications  Activities of Daily Living (ADLs)/Instrumental Activities of Daily Living (IADLs):   Walk and transfer into and out of bed and chair?: Yes  Dress and groom yourself?: Yes    Bathe or shower yourself?: Yes    Feed yourself?  Yes  Do your laundry/housekeeping?: Yes  Manage your money, pay your bills and track your expenses?: Yes  Make your own meals?: Yes    Do your own shopping?: Yes    Previous Hospitalizations:   Any hospitalizations or ED visits within the last 12 months?: No      Advance Care Planning:   Living will: No    Durable POA for healthcare: No    Advanced directive: No    Advanced directive counseling given: Yes      Cognitive Screening:   Provider or family/friend/caregiver concerned regarding cognition?: No    PREVENTIVE SCREENINGS      Cardiovascular Screening:    General: Screening Current      Diabetes Screening:     General: Screening Current      Colorectal Cancer Screening:     General: Screening Current      Breast Cancer Screening:     General: Screening Current      Cervical Cancer Screening:    General: Screening Not Indicated      Lung Cancer Screening:     General: Screening Not Indicated      Loraine Pierce DO

## 2019-11-12 NOTE — PROGRESS NOTES
BMI Counseling: Body mass index is 30 55 kg/m²  The BMI is above normal  Nutrition recommendations include decreasing portion sizes  Exercise recommendations include obtaining a gym membership  Pt to go on the [address], she has the silver sneakers program        Assessment/Plan:    Medicare annual wellness visit, subsequent  Assessment and plan 1  Medicare subsequent annual wellness examination overall the patient is clinically stable and doing well, we encouraged the patient to follow a healthy and balanced diet  We recommend that the patient exercise routinely approximately 30 minutes 5 times per week   We have reviewed the patient's vaccines and have made recommendations for updates if necessary annual flu vaccine recommended and also consider the new shingles vaccine       We will be ordering screening laboratories which are age appropriate  Return to the office in  12 months    call if any problems  Hypothyroidism  Hypothyroidism controlled the patient is currently euthyroid I will be ordering a TSH prior to the next office visit and the patient will continue with current medical regiment; we will continue to monitor the patient's progress  Continue levothyroxine 88 mcg once daily    Atrial fibrillation (HCC)  Paroxysmal atrial fibrillation in the context of pneumonia currently in normal sinus rhythm review the recent cardiology report  She is currently on aspirin 81 mg once daily she is rate controlled will continue monitor  Benign essential hypertension  Hypertension - controlled, I have counseled patient following healthy balance diet, I would like the patient reduce sodium, exercise routinely, I would like the patient continued the med current medical regiment and we will continue to monitor      Meniere's disease  Patient does request medication for vertigo I have recommended Antivert 12 5 mg 1 tab once daily         Problem List Items Addressed This Visit        Endocrine    Hypothyroidism Hypothyroidism controlled the patient is currently euthyroid I will be ordering a TSH prior to the next office visit and the patient will continue with current medical regiment; we will continue to monitor the patient's progress  Continue levothyroxine 88 mcg once daily         Relevant Orders    TSH, 3rd generation       Cardiovascular and Mediastinum    Atrial fibrillation (HCC)     Paroxysmal atrial fibrillation in the context of pneumonia currently in normal sinus rhythm review the recent cardiology report  She is currently on aspirin 81 mg once daily she is rate controlled will continue monitor  Benign essential hypertension     Hypertension - controlled, I have counseled patient following healthy balance diet, I would like the patient reduce sodium, exercise routinely, I would like the patient continued the med current medical regiment and we will continue to monitor  Relevant Orders    Comprehensive metabolic panel    Lipid Panel with Direct LDL reflex    Essential hypertension - Primary    Relevant Orders    Hemoglobin A1C       Other    Medicare annual wellness visit, subsequent     Assessment and plan 1  Medicare subsequent annual wellness examination overall the patient is clinically stable and doing well, we encouraged the patient to follow a healthy and balanced diet  We recommend that the patient exercise routinely approximately 30 minutes 5 times per week   We have reviewed the patient's vaccines and have made recommendations for updates if necessary annual flu vaccine recommended and also consider the new shingles vaccine       We will be ordering screening laboratories which are age appropriate  Return to the office in  12 months    call if any problems           Screening for diabetes mellitus    Screening for osteoporosis    Relevant Orders    DXA bone density spine hip and pelvis      Other Visit Diagnoses     Class 1 obesity due to excess calories without serious comorbidity with body mass index (BMI) of 30 0 to 30 9 in adult              Return to office 12  months  call if any problems  Subjective:      Patient ID: Stephanie Massey is a 78 y o  female  HPI 78-year old female coming in for a follow up office visit regarding essential hypertension, hypothyroidism, paroxysmal atrial fibrillation, obesity; The patient reports me compliant taking medications without untoward side effects the  The patient is here to review his medical condition, update me on the medical condition and the patient reports me no hospitalizations and no ER visits  She is here to review her laboratories  She has seen her cardiologist and we reviewed that report today  Overall she is feeling well she is trying to follow healthy diet and will be trying the [employer] program which has been very helpful in the past     The following portions of the patient's history were reviewed and updated as appropriate: allergies, current medications, past family history, past medical history, past social history, past surgical history and problem list     Review of Systems   Constitutional: Negative for activity change, appetite change and unexpected weight change  HENT: Negative for congestion and postnasal drip  Eyes: Negative for visual disturbance  Respiratory: Negative for cough and shortness of breath  Cardiovascular: Negative for chest pain  Gastrointestinal: Negative for abdominal pain, diarrhea, nausea and vomiting  Neurological: Positive for dizziness  Negative for light-headedness and headaches  Hematological: Negative for adenopathy  Objective:    No follow-ups on file  No results found  Recent Results (from the past 55629 hour(s))   Holter monitor - 24 hour    Narrative    PT NAME: Stephanie Massey  : 1940  AGE: 78 y o    GENDER: female  MRN: [id number]   PROCEDURE: Holter monitor - 24 hour        INDICATIONS: PAF      FINDINGS:  1  A 24 hour holter monitor demonstrated normal sinus rhythm with an   average rate of 87 BPM; a minimum rate of 50 BPM; and a maximum rate of   110 BPM   2  There were 719 ventricular ectopic beats, the majority of which were   premature ventricular contractions  There was one ventricular couplet,   but no runs of non-sustained ventricular tachycardia  3  There were 848 supraventricular ectopic beats, the majority of which   were premature atrial contractions  There were 14 atrial couplets, and a   4 and 5 beat run supraventricular tachycardia, likely atrial tachycardia  4  The longest R-R interval was 1 6 seconds  5  The patient kept a diary during holter monitor  It demonstrated one   episode of irregular heart beat that correlated with sinus rhythm  Impression    1  Abnormal 24 hour holter monitor  2  Patient in normal sinus rhythm throughout holter monitoring  3  Frequent premature ventricular contractions with no non-sustained   ventricular tachycardia  4  Frequent premature atrial contractions with two brief episodes of   atrial tachycardia  5  No significant pauses  6  Symptoms on diary did not correlate with any dysrhythmia  POCT ECG    Impression    Sinus lizzie       Allergies   Allergen Reactions    Dust Mite Extract     Fluticasone-Salmeterol Hives     Category: Allergy;     Latex     Molds & Smuts     Morphine Nausea Only    Penicillins Hives     Category: Allergy;     Pollen Extract Allergic Rhinitis, Cough, Dermatitis and Itching       Past Medical History:   Diagnosis Date    Arthritis     "Everywhere"OA    Atrial fibrillation (HCC)     HL (hearing loss)     Hypokalemia     last assessed: 4/25/2015    Meniere's disease     Dx 40 yrs ago per pt      Squamous cell skin cancer     (SCCIS)     Past Surgical History:   Procedure Laterality Date    HYSTERECTOMY      partial  age 30     Current Outpatient Medications on File Prior to Visit   Medication Sig Dispense Refill    acetaminophen (TYLENOL) 500 mg tablet Take 500 mg by mouth daily      Ascorbic Acid (VITAMIN C) 500 MG CAPS Take by mouth      aspirin 81 MG tablet Take 1 tablet by mouth daily      Calcium Carb-Cholecalciferol (CALTRATE 600+D) 600-800 MG-UNIT TABS Take by mouth      Cholecalciferol (VITAMIN D-3) 1000 units CAPS Take by mouth      fluorouracil (EFUDEX) 5 % cream Apply topically twice a day for 4 weeks straight 40 g 0    Glucosamine 750 MG TABS Take by mouth      levothyroxine 88 mcg tablet TAKE 1 TABLET BY MOUTH ONCE DAILY 90 tablet 1    Loratadine (CLARITIN) 10 MG CAPS Take by mouth daily      loratadine-pseudoephedrine (CLARITIN-D 24-HOUR)  mg per 24 hr tablet Take 1 tablet by mouth daily      metoprolol tartrate (LOPRESSOR) 25 mg tablet TAKE ONE TABLET BY MOUTH EVERY DAY 90 tablet 3    Multiple Vitamins-Minerals (CENTRUM SILVER ULTRA WOMENS) TABS Take by mouth      Omega-3 Fatty Acids (FISH OIL) 645 MG CAPS Take by mouth      polymyxin b-trimethoprim (POLYTRIM) ophthalmic solution       prednisoLONE acetate (PRED FORTE) 1 % ophthalmic suspension       triamterene-hydrochlorothiazide (MAXZIDE-25) 37 5-25 mg per tablet Take 1 tablet by mouth daily 90 tablet 1    cyclobenzaprine (FLEXERIL) 5 mg tablet Take 1 tablet (5 mg total) by mouth daily at bedtime for 5 days (Patient not taking: Reported on 4/3/2019) 5 tablet 0     No current facility-administered medications on file prior to visit        Family History   Problem Relation Age of Onset    Dementia Mother     Colon cancer Father 80    No Known Problems Sister     No Known Problems Daughter     No Known Problems Daughter     No Known Problems Maternal Aunt     No Known Problems Paternal Aunt     No Known Problems Paternal Aunt     No Known Problems Paternal Aunt     Brain cancer Half-Sister      Social History     Socioeconomic History    Marital status: /Civil Union     Spouse name: Not on file    Number of children: Not on file   [facility] Years of education: Not on file    Highest education level: Not on file   Occupational History    Not on file   Social Needs    Financial resource strain: Not on file    Food insecurity:     Worry: Not on file     Inability: Not on file    Transportation needs:     Medical: Not on file     Non-medical: Not on file   Tobacco Use    Smoking status: Former Smoker     Last attempt to quit: 1973     Years since quittin 8    Smokeless tobacco: Never Used    Tobacco comment: former smoker   Substance and Sexual Activity    Alcohol use: No     Comment: never drank alcohol    Drug use: No    Sexual activity: Not on file   Lifestyle    Physical activity:     Days per week: Not on file     Minutes per session: Not on file    Stress: Not on file   Relationships    Social connections:     Talks on phone: Not on file     Gets together: Not on file     Attends Jewish service: Not on file     Active member of club or organization: Not on file     Attends meetings of clubs or organizations: Not on file     Relationship status: Not on file    Intimate partner violence:     Fear of current or ex partner: Not on file     Emotionally abused: Not on file     Physically abused: Not on file     Forced sexual activity: Not on file   Other Topics Concern    Not on file   Social History Narrative    Not on file     Vitals:    19 1254   BP: 124/68   Pulse: 58   Resp: 16   SpO2: 97%   Weight: 83 3 kg (183 lb 9 6 oz)   Height: 5' 5" (1 651 m)     Results for orders placed or performed in visit on 19   Comprehensive metabolic panel   Result Value Ref Range    Sodium 140 136 - 145 mmol/L    Potassium 4 5 3 5 - 5 3 mmol/L    Chloride 107 100 - 108 mmol/L    CO2 28 21 - 32 mmol/L    ANION GAP 5 4 - 13 mmol/L    BUN 12 5 - 25 mg/dL    Creatinine 0 69 0 60 - 1 30 mg/dL    Glucose, Fasting 99 65 - 99 mg/dL    Calcium 9 0 8 3 - 10 1 mg/dL    AST 21 5 - 45 U/L    ALT 23 12 - 78 U/L    Alkaline Phosphatase 88 46 - 116 U/L    Total Protein 6 9 6 4 - 8 2 g/dL    Albumin 3 7 3 5 - 5 0 g/dL    Total Bilirubin 0 51 0 20 - 1 00 mg/dL    eGFR 82 ml/min/1 73sq m   Lipid Panel with Direct LDL reflex   Result Value Ref Range    Cholesterol 164 50 - 200 mg/dL    Triglycerides 54 <=150 mg/dL    HDL, Direct 69 >=40 mg/dL    LDL Calculated 84 0 - 100 mg/dL   Hemoglobin A1C   Result Value Ref Range    Hemoglobin A1C 5 6 4 2 - 6 3 %     mg/dl   TSH, 3rd generation   Result Value Ref Range    TSH 3RD GENERATON 2 490 0 358 - 3 740 uIU/mL     Weight (last 2 days)     Date/Time   Weight    11/12/19 1254   83 3 (183 6)            Body mass index is 30 55 kg/m²  BP      Temp      Pulse     Resp      SpO2        Vitals:    11/12/19 1254   Weight: 83 3 kg (183 lb 9 6 oz)     Vitals:    11/12/19 1254   Weight: 83 3 kg (183 lb 9 6 oz)       /68   Pulse 58   Resp 16   Ht 5' 5" (1 651 m)   Wt 83 3 kg (183 lb 9 6 oz)   SpO2 97%   BMI 30 55 kg/m²          Physical Exam   Constitutional: She appears well-developed and well-nourished  HENT:   Head: Normocephalic  Mouth/Throat: Oropharynx is clear and moist    Eyes: Pupils are equal, round, and reactive to light  Conjunctivae are normal  Right eye exhibits no discharge  Left eye exhibits no discharge  No scleral icterus  Neck: Neck supple  Cardiovascular: Normal rate, regular rhythm, normal heart sounds and intact distal pulses  Exam reveals no gallop and no friction rub  No murmur heard  Pulmonary/Chest: Breath sounds normal  No respiratory distress  She has no wheezes  She has no rales  Abdominal: Soft  Bowel sounds are normal  She exhibits no distension and no mass  There is no tenderness  There is no rebound and no guarding  Musculoskeletal: She exhibits no edema or deformity  Lymphadenopathy:     She has no cervical adenopathy  Neurological: She is alert   Coordination normal

## 2019-11-13 ENCOUNTER — TELEPHONE (OUTPATIENT)
Dept: INTERNAL MEDICINE CLINIC | Facility: CLINIC | Age: 79
End: 2019-11-13

## 2019-11-13 DIAGNOSIS — R42 VERTIGO: Primary | ICD-10-CM

## 2019-11-13 RX ORDER — MECLIZINE HCL 12.5 MG/1
12.5 TABLET ORAL DAILY PRN
Qty: 30 TABLET | Refills: 0 | Status: SHIPPED | OUTPATIENT
Start: 2019-11-13 | End: 2022-06-24 | Stop reason: SDUPTHER

## 2019-11-13 NOTE — ASSESSMENT & PLAN NOTE
Paroxysmal atrial fibrillation in the context of pneumonia currently in normal sinus rhythm review the recent cardiology report  She is currently on aspirin 81 mg once daily she is rate controlled will continue monitor

## 2019-11-13 NOTE — ASSESSMENT & PLAN NOTE
Assessment and plan 1  Medicare subsequent annual wellness examination overall the patient is clinically stable and doing well, we encouraged the patient to follow a healthy and balanced diet  We recommend that the patient exercise routinely approximately 30 minutes 5 times per week   We have reviewed the patient's vaccines and have made recommendations for updates if necessary annual flu vaccine recommended and also consider the new shingles vaccine       We will be ordering screening laboratories which are age appropriate  Return to the office in  12 months    call if any problems

## 2019-11-13 NOTE — ASSESSMENT & PLAN NOTE
Hypothyroidism controlled the patient is currently euthyroid I will be ordering a TSH prior to the next office visit and the patient will continue with current medical regiment; we will continue to monitor the patient's progress    Continue levothyroxine 88 mcg once daily

## 2020-01-29 DIAGNOSIS — E03.9 HYPOTHYROIDISM, UNSPECIFIED TYPE: ICD-10-CM

## 2020-01-29 RX ORDER — LEVOTHYROXINE SODIUM 88 UG/1
TABLET ORAL
Qty: 90 TABLET | Refills: 0 | Status: SHIPPED | OUTPATIENT
Start: 2020-01-29 | End: 2020-04-27

## 2020-04-27 DIAGNOSIS — E03.9 HYPOTHYROIDISM, UNSPECIFIED TYPE: ICD-10-CM

## 2020-04-27 RX ORDER — LEVOTHYROXINE SODIUM 88 UG/1
TABLET ORAL
Qty: 90 TABLET | Refills: 0 | Status: SHIPPED | OUTPATIENT
Start: 2020-04-27 | End: 2020-07-28

## 2020-07-28 DIAGNOSIS — E03.9 HYPOTHYROIDISM, UNSPECIFIED TYPE: ICD-10-CM

## 2020-07-28 RX ORDER — LEVOTHYROXINE SODIUM 88 UG/1
TABLET ORAL
Qty: 90 TABLET | Refills: 0 | Status: SHIPPED | OUTPATIENT
Start: 2020-07-28 | End: 2020-10-27 | Stop reason: SDUPTHER

## 2020-09-25 ENCOUNTER — HOSPITAL ENCOUNTER (OUTPATIENT)
Dept: RADIOLOGY | Age: 80
Discharge: HOME | End: 2020-09-25
Payer: COMMERCIAL

## 2020-09-25 VITALS — WEIGHT: 185 LBS | HEIGHT: 65 IN | BODY MASS INDEX: 30.82 KG/M2

## 2020-09-25 DIAGNOSIS — Z12.31 ENCOUNTER FOR SCREENING MAMMOGRAM FOR MALIGNANT NEOPLASM OF BREAST: ICD-10-CM

## 2020-09-25 PROCEDURE — 77063 BREAST TOMOSYNTHESIS BI: CPT

## 2020-09-25 PROCEDURE — 77067 SCR MAMMO BI INCL CAD: CPT

## 2020-09-30 ENCOUNTER — OFFICE VISIT (OUTPATIENT)
Dept: CARDIOLOGY CLINIC | Facility: CLINIC | Age: 80
End: 2020-09-30
Payer: COMMERCIAL

## 2020-09-30 VITALS
DIASTOLIC BLOOD PRESSURE: 62 MMHG | SYSTOLIC BLOOD PRESSURE: 122 MMHG | BODY MASS INDEX: 30.15 KG/M2 | TEMPERATURE: 98.6 F | HEART RATE: 66 BPM | WEIGHT: 187.6 LBS | HEIGHT: 66 IN

## 2020-09-30 DIAGNOSIS — I10 BENIGN ESSENTIAL HYPERTENSION: ICD-10-CM

## 2020-09-30 DIAGNOSIS — I48.0 PAROXYSMAL ATRIAL FIBRILLATION (HCC): Primary | ICD-10-CM

## 2020-09-30 PROCEDURE — 93000 ELECTROCARDIOGRAM COMPLETE: CPT | Performed by: INTERNAL MEDICINE

## 2020-09-30 PROCEDURE — 99213 OFFICE O/P EST LOW 20 MIN: CPT | Performed by: INTERNAL MEDICINE

## 2020-09-30 NOTE — PROGRESS NOTES
Cardiology Follow Up    Lissa Crook  1940  [id number]  [address]  [address] [address] [facility] [address]  [phone]    1  Paroxysmal atrial fibrillation (HCC)     2  Benign essential hypertension         Interval History:  Cardiology follow-up  Patient continues to do well from a cardiac point of view denies any palpitations  No syncope or presyncope  Denies any focal neurological deficits or amaurosis fugax  Lipids last year total cholesterol 164, HDL 69, LDL of 84  No bleeding issues on chronic aspirin therapy  Compliant low-sodium diet, blood pressures been well controlled  She tries to be active, no regular exercise  Occasional palpitations  Mostly at night when she is laying down  She does have lower extremity edema mostly on the left side, she is no longer taking the diuretic    Patient Active Problem List   Diagnosis    Atrial fibrillation ([facility] [address] )    Benign essential hypertension    Hypothyroidism    Medicare annual wellness visit, subsequent    Preoperative clearance    Age-related cataract of both eyes    Preop examination    Age-related cataract    Essential hypertension    Screening for diabetes mellitus    Screening for osteoporosis    Meniere's disease     Past Medical History:   Diagnosis Date    Arthritis     "Everywhere"OA    Atrial fibrillation ([facility] [address] )     HL (hearing loss)     Hypokalemia     last assessed: 4/25/2015    Meniere's disease     Dx 40 yrs ago per pt      Squamous cell skin cancer     (SCCIS)     Social History     Socioeconomic History    Marital status: /Civil Union     Spouse name: Not on file    Number of children: Not on file    Years of education: Not on file    Highest education level: Not on file   Occupational History    Not on file   Social Needs    Financial resource strain: Not on file    Food insecurity     Worry: Not on file Inability: Not on file    Transportation needs     Medical: Not on file     Non-medical: Not on file   Tobacco Use    Smoking status: Former Smoker     Last attempt to quit: 1973     Years since quittin 7    Smokeless tobacco: Never Used    Tobacco comment: former smoker   Substance and Sexual Activity    Alcohol use: No     Comment: never drank alcohol    Drug use: No    Sexual activity: Not on file   Lifestyle    Physical activity     Days per week: Not on file     Minutes per session: Not on file    Stress: Not on file   Relationships    Social connections     Talks on phone: Not on file     Gets together: Not on file     Attends [employer] service: Not on file     Active member of club or organization: Not on file     Attends meetings of clubs or organizations: Not on file     Relationship status: Not on file    Intimate partner violence     Fear of current or ex partner: Not on file     Emotionally abused: Not on file     Physically abused: Not on file     Forced sexual activity: Not on file   Other Topics Concern    Not on file   Social History Narrative    Not on file      Family History   Problem Relation Age of Onset    Dementia Mother     Colon cancer Father 80    No Known Problems Sister     No Known Problems Daughter     No Known Problems Daughter     No Known Problems Maternal Aunt     No Known Problems Paternal Aunt     No Known Problems Paternal Aunt     No Known Problems Paternal Aunt     Brain cancer Half-Sister      Past Surgical History:   Procedure Laterality Date    HYSTERECTOMY      partial  age 30       Current Outpatient Medications:     acetaminophen (TYLENOL) 500 mg tablet, Take 500 mg by mouth daily, Disp: , Rfl:     Ascorbic Acid (VITAMIN C) 500 MG CAPS, Take by mouth, Disp: , Rfl:     aspirin 81 MG tablet, Take 1 tablet by mouth daily, Disp: , Rfl:     Calcium Carb-Cholecalciferol (CALTRATE 600+D) 600-800 MG-UNIT TABS, Take by mouth, Disp: , Rfl:    Cholecalciferol (VITAMIN D-3) 1000 units CAPS, Take by mouth, Disp: , Rfl:     cyclobenzaprine (FLEXERIL) 5 mg tablet, Take 1 tablet (5 mg total) by mouth daily at bedtime for 5 days (Patient not taking: Reported on 4/3/2019), Disp: 5 tablet, Rfl: 0    fluorouracil (EFUDEX) 5 % cream, Apply topically twice a day for 4 weeks straight, Disp: 40 g, Rfl: 0    Glucosamine 750 MG TABS, Take by mouth, Disp: , Rfl:     levothyroxine 88 mcg tablet, Take 1 tablet by mouth once daily, Disp: 90 tablet, Rfl: 0    Loratadine (CLARITIN) 10 MG CAPS, Take by mouth daily, Disp: , Rfl:     loratadine-pseudoephedrine (CLARITIN-D 24-HOUR)  mg per 24 hr tablet, Take 1 tablet by mouth daily, Disp: , Rfl:     meclizine (ANTIVERT) 12 5 MG tablet, Take 1 tablet (12 5 mg total) by mouth daily as needed for dizziness, Disp: 30 tablet, Rfl: 0    metoprolol tartrate (LOPRESSOR) 25 mg tablet, TAKE ONE TABLET BY MOUTH EVERY DAY, Disp: 90 tablet, Rfl: 3    Multiple Vitamins-Minerals (CENTRUM SILVER ULTRA WOMENS) TABS, Take by mouth, Disp: , Rfl:     Omega-3 Fatty Acids (FISH OIL) 645 MG CAPS, Take by mouth, Disp: , Rfl:     polymyxin b-trimethoprim (POLYTRIM) ophthalmic solution, , Disp: , Rfl:     prednisoLONE acetate (PRED FORTE) 1 % ophthalmic suspension, , Disp: , Rfl:     triamterene-hydrochlorothiazide (MAXZIDE-25) 37 5-25 mg per tablet, Take 1 tablet by mouth daily, Disp: 90 tablet, Rfl: 1  Allergies   Allergen Reactions    Dust Mite Extract     Fluticasone-Salmeterol Hives     Category: Allergy;     Latex     Molds & Smuts     Morphine Nausea Only    Penicillins Hives     Category: Allergy;     Pollen Extract Allergic Rhinitis, Cough, Dermatitis and Itching       Labs:  No visits with results within 6 Month(s) from this visit     Latest known visit with results is:   Appointment on 11/05/2019   Component Date Value    Sodium 11/05/2019 140     Potassium 11/05/2019 4 5     Chloride 11/05/2019 107     CO2 11/05/2019 28     ANION GAP 11/05/2019 5     BUN 11/05/2019 12     Creatinine 11/05/2019 0 69     Glucose, Fasting 11/05/2019 99     Calcium 11/05/2019 9 0     AST 11/05/2019 21     ALT 11/05/2019 23     Alkaline Phosphatase 11/05/2019 88     Total Protein 11/05/2019 6 9     Albumin 11/05/2019 3 7     Total Bilirubin 11/05/2019 0 51     eGFR 11/05/2019 82     Cholesterol 11/05/2019 164     Triglycerides 11/05/2019 54     HDL, Direct 11/05/2019 69     LDL Calculated 11/05/2019 84     Hemoglobin A1C 11/05/2019 5 6     EAG 11/05/2019 114     TSH 3RD GENERATON 11/05/2019 2 490      Imaging: Mammo Screening Bilateral W 3d & Cad    Result Date: 9/25/2020  Narrative: DIAGNOSIS: Encounter for screening mammogram for malignant neoplasm of breast TECHNIQUE: Digital screening mammography was performed  Computer Aided Detection (CAD) analyzed all applicable images  COMPARISONS: Prior breast imaging dated: 09/20/2019, 09/14/2018, 09/08/2017, 09/02/2016, and 08/28/2015 RELEVANT HISTORY: Family Breast Cancer History: No known family history of breast cancer  Family Medical History: Family medical history includes colon cancer in father  Personal History: Hormone history includes birth control  Surgical history includes hysterectomy  No known relevant medical history  The patient is scheduled in a reminder system for screening mammography  8-10% of cancers will be missed on mammography  Management of a palpable abnormality must be based on clinical grounds  Patients will be notified of their results via letter from our facility  Accredited by [employer] of [employer] and FDA  RISK ASSESSMENT: 5 Year Tyrer-Cuzick: No Score 10 Year Tyrer-Cuzick: No Score Lifetime Tyrer-Cuzick: 0 23 % TISSUE DENSITY: The breasts are almost entirely fatty  INDICATION: Morgan Aiken is a 78 y o  female presenting for screening mammography   FINDINGS: Bilateral There are no suspicious masses, grouped microcalcifications or areas of architectural distortion  The skin and nipple areolar complex are unremarkable  There are scattered benign-appearing calcifications present bilaterally  Impression: No mammographic evidence of malignancy  ASSESSMENT/BI-RADS CATEGORY: Left: 2 - Benign Right: 2 - Benign Overall: 2 - Benign RECOMMENDATION:      - Routine screening mammogram in 1 year for both breasts  Workstation ID: [id number]       Review of Systems:  Review of Systems   Constitutional: Negative for fatigue  Respiratory: Negative for apnea, shortness of breath, wheezing and stridor  Gastrointestinal: Negative for abdominal pain, anal bleeding and blood in stool  Neurological: Negative for syncope, facial asymmetry, speech difficulty, weakness and numbness  Hematological: Does not bruise/bleed easily  Psychiatric/Behavioral: Negative for sleep disturbance  Physical Exam:  Physical Exam  Vitals signs reviewed  Constitutional:       General: She is not in acute distress  Appearance: Normal appearance  She is not ill-appearing, toxic-appearing or diaphoretic  Neck:      Vascular: No carotid bruit  Cardiovascular:      Rate and Rhythm: Normal rate and regular rhythm  Pulses: Normal pulses  Heart sounds: Normal heart sounds  No murmur  No friction rub  No gallop  Pulmonary:      Effort: Pulmonary effort is normal  No respiratory distress  Breath sounds: Normal breath sounds  No stridor  No wheezing, rhonchi or rales  Skin:     General: Skin is warm and dry  Capillary Refill: Capillary refill takes less than 2 seconds  Neurological:      General: No focal deficit present  Mental Status: She is alert  Psychiatric:         Mood and Affect: Mood normal          Discussion/Summary:  Paroxysmal atrial fibrillation, 1st diagnosed in 2014 in the midst of a hospitalization for pneumonia  Spontaneous conversion  She has remained clinically and electrocardiographically in normal sinus rhythm  On low-dose beta-blocker plus aspirin  Holter monitor last year revealed normal sinus rhythm with mild-to-moderate ventricular and supraventricular ectopic activity but no sustained tachyarrhythmias and no atrial fibrillation  Repeat Holter monitor, continue current medications  Stress test 2015 was negative ischemia left systolic function on echocardiogram was normal as well without any significant valvular abnormalities  Favor no testing, postural maneuvers for edema and p r n  Diuretic was recommended  This note was completed in part utilizing m[employer] fluency direct voice recognition software  Grammatical errors, random word insertion, spelling mistakes, and incomplete sentences may be an occasional consequence of the system secondary to software limitations, ambient noise and hardware issues  At the time of dictation, efforts were made to edit, clarify and /or correct errors  Please read the chart carefully and recognize, using context, where substitutions have occurred  If you have any questions or concerns about the context, text or information contained within the body of this dictation, please contact myself, the provider, for further clarification

## 2020-10-04 DIAGNOSIS — I10 ESSENTIAL HYPERTENSION: ICD-10-CM

## 2020-10-07 ENCOUNTER — OFFICE VISIT (OUTPATIENT)
Dept: INTERNAL MEDICINE CLINIC | Facility: CLINIC | Age: 80
End: 2020-10-07
Payer: COMMERCIAL

## 2020-10-07 VITALS
DIASTOLIC BLOOD PRESSURE: 76 MMHG | HEART RATE: 68 BPM | BODY MASS INDEX: 29.73 KG/M2 | SYSTOLIC BLOOD PRESSURE: 138 MMHG | RESPIRATION RATE: 16 BRPM | TEMPERATURE: 97.6 F | WEIGHT: 185 LBS | HEIGHT: 66 IN

## 2020-10-07 DIAGNOSIS — K12.1 ORAL ULCER: ICD-10-CM

## 2020-10-07 DIAGNOSIS — Z23 ENCOUNTER FOR IMMUNIZATION: ICD-10-CM

## 2020-10-07 DIAGNOSIS — B37.0 ORAL CANDIDA: Primary | ICD-10-CM

## 2020-10-07 DIAGNOSIS — Z23 NEEDS FLU SHOT: ICD-10-CM

## 2020-10-07 PROCEDURE — 99213 OFFICE O/P EST LOW 20 MIN: CPT | Performed by: GENERAL ACUTE CARE HOSPITAL

## 2020-10-07 PROCEDURE — G0008 ADMIN INFLUENZA VIRUS VAC: HCPCS

## 2020-10-07 PROCEDURE — 3078F DIAST BP <80 MM HG: CPT | Performed by: GENERAL ACUTE CARE HOSPITAL

## 2020-10-07 PROCEDURE — 1036F TOBACCO NON-USER: CPT | Performed by: GENERAL ACUTE CARE HOSPITAL

## 2020-10-07 PROCEDURE — 1160F RVW MEDS BY RX/DR IN RCRD: CPT | Performed by: GENERAL ACUTE CARE HOSPITAL

## 2020-10-07 PROCEDURE — 90662 IIV NO PRSV INCREASED AG IM: CPT

## 2020-10-07 RX ORDER — CLOTRIMAZOLE 10 MG/1
10 LOZENGE ORAL; TOPICAL
Qty: 70 TABLET | Refills: 1 | Status: SHIPPED | OUTPATIENT
Start: 2020-10-07 | End: 2020-11-16 | Stop reason: ALTCHOICE

## 2020-10-07 RX ORDER — LIDOCAINE HYDROCHLORIDE 20 MG/ML
15 SOLUTION OROPHARYNGEAL 4 TIMES DAILY PRN
Qty: 100 ML | Refills: 0 | Status: SHIPPED | OUTPATIENT
Start: 2020-10-07 | End: 2022-05-13 | Stop reason: ALTCHOICE

## 2020-10-07 RX ORDER — CLOTRIMAZOLE 10 MG/1
10 LOZENGE ORAL; TOPICAL
Qty: 79 TABLET | Refills: 1 | Status: SHIPPED | OUTPATIENT
Start: 2020-10-07 | End: 2020-10-07

## 2020-10-23 ENCOUNTER — TELEPHONE (OUTPATIENT)
Dept: INTERNAL MEDICINE CLINIC | Facility: CLINIC | Age: 80
End: 2020-10-23

## 2020-10-23 DIAGNOSIS — I10 ESSENTIAL HYPERTENSION: ICD-10-CM

## 2020-10-26 RX ORDER — TRIAMTERENE AND HYDROCHLOROTHIAZIDE 37.5; 25 MG/1; MG/1
1 TABLET ORAL DAILY
Qty: 90 TABLET | Refills: 1 | Status: SHIPPED | OUTPATIENT
Start: 2020-10-26

## 2020-10-27 DIAGNOSIS — E03.9 HYPOTHYROIDISM, UNSPECIFIED TYPE: ICD-10-CM

## 2020-10-27 RX ORDER — LEVOTHYROXINE SODIUM 88 UG/1
88 TABLET ORAL DAILY
Qty: 90 TABLET | Refills: 1 | Status: SHIPPED | OUTPATIENT
Start: 2020-10-27 | End: 2021-04-26

## 2020-11-06 ENCOUNTER — HOSPITAL ENCOUNTER (OUTPATIENT)
Dept: RADIOLOGY | Age: 80
Discharge: HOME | End: 2020-11-06
Payer: COMMERCIAL

## 2020-11-06 DIAGNOSIS — Z13.820 SCREENING FOR OSTEOPOROSIS: ICD-10-CM

## 2020-11-06 PROCEDURE — 77080 DXA BONE DENSITY AXIAL: CPT

## 2020-11-09 ENCOUNTER — LAB (OUTPATIENT)
Dept: LAB | Age: 80
End: 2020-11-09
Payer: COMMERCIAL

## 2020-11-09 DIAGNOSIS — E03.9 HYPOTHYROIDISM, UNSPECIFIED TYPE: ICD-10-CM

## 2020-11-09 DIAGNOSIS — I10 BENIGN ESSENTIAL HYPERTENSION: ICD-10-CM

## 2020-11-09 DIAGNOSIS — I10 ESSENTIAL HYPERTENSION: ICD-10-CM

## 2020-11-09 LAB
ALBUMIN SERPL BCP-MCNC: 3.7 G/DL (ref 3.5–5)
ALP SERPL-CCNC: 86 U/L (ref 46–116)
ALT SERPL W P-5'-P-CCNC: 24 U/L (ref 12–78)
ANION GAP SERPL CALCULATED.3IONS-SCNC: 4 MMOL/L (ref 4–13)
AST SERPL W P-5'-P-CCNC: 23 U/L (ref 5–45)
BILIRUB SERPL-MCNC: 0.44 MG/DL (ref 0.2–1)
BUN SERPL-MCNC: 16 MG/DL (ref 5–25)
CALCIUM SERPL-MCNC: 9.3 MG/DL (ref 8.3–10.1)
CHLORIDE SERPL-SCNC: 104 MMOL/L (ref 100–108)
CHOLEST SERPL-MCNC: 176 MG/DL (ref 50–200)
CO2 SERPL-SCNC: 28 MMOL/L (ref 21–32)
CREAT SERPL-MCNC: 0.7 MG/DL (ref 0.6–1.3)
EST. AVERAGE GLUCOSE BLD GHB EST-MCNC: 114 MG/DL
GFR SERPL CREATININE-BSD FRML MDRD: 81 ML/MIN/1.73SQ M
GLUCOSE P FAST SERPL-MCNC: 94 MG/DL (ref 65–99)
HBA1C MFR BLD: 5.6 %
HDLC SERPL-MCNC: 62 MG/DL
LDLC SERPL CALC-MCNC: 94 MG/DL (ref 0–100)
POTASSIUM SERPL-SCNC: 3.9 MMOL/L (ref 3.5–5.3)
PROT SERPL-MCNC: 6.8 G/DL (ref 6.4–8.2)
SODIUM SERPL-SCNC: 136 MMOL/L (ref 136–145)
TRIGL SERPL-MCNC: 99 MG/DL
TSH SERPL DL<=0.05 MIU/L-ACNC: 1.81 UIU/ML (ref 0.36–3.74)

## 2020-11-09 PROCEDURE — 80061 LIPID PANEL: CPT

## 2020-11-09 PROCEDURE — 83036 HEMOGLOBIN GLYCOSYLATED A1C: CPT

## 2020-11-09 PROCEDURE — 36415 COLL VENOUS BLD VENIPUNCTURE: CPT

## 2020-11-09 PROCEDURE — 80053 COMPREHEN METABOLIC PANEL: CPT

## 2020-11-09 PROCEDURE — 84443 ASSAY THYROID STIM HORMONE: CPT

## 2020-11-12 ENCOUNTER — VBI (OUTPATIENT)
Dept: ADMINISTRATIVE | Facility: OTHER | Age: 80
End: 2020-11-12

## 2020-11-16 ENCOUNTER — OFFICE VISIT (OUTPATIENT)
Dept: INTERNAL MEDICINE CLINIC | Facility: CLINIC | Age: 80
End: 2020-11-16
Payer: COMMERCIAL

## 2020-11-16 VITALS
SYSTOLIC BLOOD PRESSURE: 132 MMHG | DIASTOLIC BLOOD PRESSURE: 70 MMHG | TEMPERATURE: 97.8 F | BODY MASS INDEX: 30.39 KG/M2 | HEIGHT: 65 IN | WEIGHT: 182.4 LBS | RESPIRATION RATE: 16 BRPM | OXYGEN SATURATION: 99 % | HEART RATE: 76 BPM

## 2020-11-16 DIAGNOSIS — K12.1 ORAL ULCER: ICD-10-CM

## 2020-11-16 DIAGNOSIS — R06.83 SNORING: ICD-10-CM

## 2020-11-16 DIAGNOSIS — I10 BENIGN ESSENTIAL HYPERTENSION: ICD-10-CM

## 2020-11-16 DIAGNOSIS — Z00.00 MEDICARE ANNUAL WELLNESS VISIT, SUBSEQUENT: Primary | ICD-10-CM

## 2020-11-16 PROCEDURE — 1101F PT FALLS ASSESS-DOCD LE1/YR: CPT | Performed by: INTERNAL MEDICINE

## 2020-11-16 PROCEDURE — 3725F SCREEN DEPRESSION PERFORMED: CPT | Performed by: INTERNAL MEDICINE

## 2020-11-16 PROCEDURE — 3075F SYST BP GE 130 - 139MM HG: CPT | Performed by: INTERNAL MEDICINE

## 2020-11-16 PROCEDURE — 3288F FALL RISK ASSESSMENT DOCD: CPT | Performed by: INTERNAL MEDICINE

## 2020-11-16 PROCEDURE — G0439 PPPS, SUBSEQ VISIT: HCPCS | Performed by: INTERNAL MEDICINE

## 2020-11-16 PROCEDURE — 3078F DIAST BP <80 MM HG: CPT | Performed by: INTERNAL MEDICINE

## 2020-11-16 PROCEDURE — 1125F AMNT PAIN NOTED PAIN PRSNT: CPT | Performed by: INTERNAL MEDICINE

## 2020-11-16 PROCEDURE — 1160F RVW MEDS BY RX/DR IN RCRD: CPT | Performed by: INTERNAL MEDICINE

## 2020-11-16 PROCEDURE — 1036F TOBACCO NON-USER: CPT | Performed by: INTERNAL MEDICINE

## 2020-11-16 PROCEDURE — 1170F FXNL STATUS ASSESSED: CPT | Performed by: INTERNAL MEDICINE

## 2020-11-17 PROBLEM — R06.83 SNORING: Status: ACTIVE | Noted: 2020-11-17

## 2021-01-18 ENCOUNTER — VBI (OUTPATIENT)
Dept: ADMINISTRATIVE | Facility: OTHER | Age: 81
End: 2021-01-18

## 2021-03-30 ENCOUNTER — HOSPITAL ENCOUNTER (OUTPATIENT)
Dept: NON INVASIVE DIAGNOSTICS | Facility: CLINIC | Age: 81
Discharge: HOME | End: 2021-03-30
Payer: COMMERCIAL

## 2021-03-30 DIAGNOSIS — I48.0 PAROXYSMAL ATRIAL FIBRILLATION (HCC): ICD-10-CM

## 2021-03-30 PROCEDURE — 93225 XTRNL ECG REC<48 HRS REC: CPT

## 2021-03-30 PROCEDURE — 93226 XTRNL ECG REC<48 HR SCAN A/R: CPT

## 2021-04-02 PROCEDURE — 93227 XTRNL ECG REC<48 HR R&I: CPT | Performed by: INTERNAL MEDICINE

## 2021-04-25 DIAGNOSIS — E03.9 HYPOTHYROIDISM, UNSPECIFIED TYPE: ICD-10-CM

## 2021-04-26 RX ORDER — LEVOTHYROXINE SODIUM 88 UG/1
TABLET ORAL
Qty: 90 TABLET | Refills: 0 | Status: SHIPPED | OUTPATIENT
Start: 2021-04-26 | End: 2021-07-23

## 2021-07-23 DIAGNOSIS — E03.9 HYPOTHYROIDISM, UNSPECIFIED TYPE: ICD-10-CM

## 2021-07-23 RX ORDER — LEVOTHYROXINE SODIUM 88 UG/1
TABLET ORAL
Qty: 90 TABLET | Refills: 1 | Status: SHIPPED | OUTPATIENT
Start: 2021-07-23 | End: 2021-11-17 | Stop reason: SDUPTHER

## 2021-09-20 ENCOUNTER — APPOINTMENT (EMERGENCY)
Dept: RADIOLOGY | Facility: HOSPITAL | Age: 81
End: 2021-09-20
Payer: COMMERCIAL

## 2021-09-20 ENCOUNTER — HOSPITAL ENCOUNTER (EMERGENCY)
Facility: HOSPITAL | Age: 81
Discharge: HOME | End: 2021-09-20
Attending: EMERGENCY MEDICINE
Payer: COMMERCIAL

## 2021-09-20 VITALS
HEART RATE: 66 BPM | OXYGEN SATURATION: 95 % | RESPIRATION RATE: 18 BRPM | TEMPERATURE: 97.8 F | SYSTOLIC BLOOD PRESSURE: 144 MMHG | DIASTOLIC BLOOD PRESSURE: 68 MMHG

## 2021-09-20 DIAGNOSIS — R07.89 CHEST WALL PAIN: ICD-10-CM
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
## 2021-09-20 DIAGNOSIS — S80.12XA CONTUSION OF LEFT LOWER EXTREMITY, INITIAL ENCOUNTER: ICD-10-CM

## 2021-09-20 PROCEDURE — 99284 EMERGENCY DEPT VISIT MOD MDM: CPT | Performed by: EMERGENCY MEDICINE

## 2021-09-20 PROCEDURE — 73110 X-RAY EXAM OF WRIST: CPT

## 2021-09-20 PROCEDURE — 73590 X-RAY EXAM OF LOWER LEG: CPT

## 2021-09-20 PROCEDURE — 73130 X-RAY EXAM OF HAND: CPT

## 2021-09-20 PROCEDURE — 71046 X-RAY EXAM CHEST 2 VIEWS: CPT

## 2021-09-20 PROCEDURE — 99284 EMERGENCY DEPT VISIT MOD MDM: CPT

## 2021-09-20 RX ORDER — ACETAMINOPHEN 325 MG/1
975 TABLET ORAL ONCE
Status: COMPLETED | OUTPATIENT
Start: 2021-09-20 | End: 2021-09-20

## 2021-09-20 RX ADMIN — ACETAMINOPHEN 975 MG: 325 TABLET, FILM COATED ORAL at 10:51
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
## 2021-09-22 ENCOUNTER — VBI (OUTPATIENT)
Dept: ADMINISTRATIVE | Facility: OTHER | Age: 81
End: 2021-09-22

## 2021-09-23 ENCOUNTER — OFFICE VISIT (OUTPATIENT)
Dept: INTERNAL MEDICINE CLINIC | Facility: CLINIC | Age: 81
End: 2021-09-23
Payer: COMMERCIAL

## 2021-09-23 VITALS
OXYGEN SATURATION: 99 % | WEIGHT: 193.2 LBS | HEART RATE: 59 BPM | DIASTOLIC BLOOD PRESSURE: 64 MMHG | BODY MASS INDEX: 32.19 KG/M2 | HEIGHT: 65 IN | SYSTOLIC BLOOD PRESSURE: 132 MMHG

## 2021-09-23 DIAGNOSIS — I82.90 BLOOD CLOT IN VEIN: ICD-10-CM

## 2021-09-23 DIAGNOSIS — T14.8XXA HEMATOMA AND CONTUSION: Primary | ICD-10-CM

## 2021-09-23 DIAGNOSIS — E03.9 HYPOTHYROIDISM, UNSPECIFIED TYPE: ICD-10-CM

## 2021-09-23 PROCEDURE — 99213 OFFICE O/P EST LOW 20 MIN: CPT | Performed by: INTERNAL MEDICINE

## 2021-09-23 PROCEDURE — 1160F RVW MEDS BY RX/DR IN RCRD: CPT | Performed by: INTERNAL MEDICINE

## 2021-09-28 ENCOUNTER — HOSPITAL ENCOUNTER (OUTPATIENT)
Dept: NON INVASIVE DIAGNOSTICS | Facility: HOSPITAL | Age: 81
Discharge: HOME | End: 2021-09-28
Payer: COMMERCIAL

## 2021-09-28 DIAGNOSIS — I82.90 BLOOD CLOT IN VEIN: ICD-10-CM

## 2021-09-28 PROCEDURE — 93971 EXTREMITY STUDY: CPT

## 2021-09-28 PROCEDURE — 93971 EXTREMITY STUDY: CPT | Performed by: SURGERY

## 2021-09-30 PROBLEM — T14.8XXA HEMATOMA AND CONTUSION: Status: ACTIVE | Noted: 2021-09-30
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
## 2021-10-15 ENCOUNTER — OFFICE VISIT (OUTPATIENT)
Dept: OBGYN CLINIC | Facility: CLINIC | Age: 81
End: 2021-10-15
Payer: COMMERCIAL

## 2021-10-15 VITALS
SYSTOLIC BLOOD PRESSURE: 153 MMHG | DIASTOLIC BLOOD PRESSURE: 76 MMHG | HEART RATE: 64 BPM | WEIGHT: 193.63 LBS | HEIGHT: 65 IN | BODY MASS INDEX: 32.26 KG/M2

## 2021-10-15 DIAGNOSIS — S80.12XA HEMATOMA OF LEFT LOWER LEG: Primary | ICD-10-CM

## 2021-10-15 PROCEDURE — 99203 OFFICE O/P NEW LOW 30 MIN: CPT | Performed by: ORTHOPAEDIC SURGERY

## 2021-10-15 RX ORDER — CEPHALEXIN 500 MG/1
500 CAPSULE ORAL EVERY 12 HOURS SCHEDULED
Qty: 14 CAPSULE | Refills: 0 | Status: SHIPPED | OUTPATIENT
Start: 2021-10-15 | End: 2021-10-22

## 2021-10-27 ENCOUNTER — OFFICE VISIT (OUTPATIENT)
Dept: OBGYN CLINIC | Facility: HOSPITAL | Age: 81
End: 2021-10-27
Payer: COMMERCIAL

## 2021-10-27 VITALS
HEART RATE: 67 BPM | SYSTOLIC BLOOD PRESSURE: 152 MMHG | WEIGHT: 193 LBS | HEIGHT: 65 IN | BODY MASS INDEX: 32.15 KG/M2 | DIASTOLIC BLOOD PRESSURE: 69 MMHG

## 2021-10-27 DIAGNOSIS — S80.12XA HEMATOMA OF LEFT LOWER LEG: Primary | ICD-10-CM

## 2021-10-27 PROCEDURE — 99213 OFFICE O/P EST LOW 20 MIN: CPT | Performed by: ORTHOPAEDIC SURGERY

## 2021-11-12 ENCOUNTER — APPOINTMENT (OUTPATIENT)
Dept: LAB | Age: 81
End: 2021-11-12
Payer: COMMERCIAL

## 2021-11-12 DIAGNOSIS — Z00.00 MEDICARE ANNUAL WELLNESS VISIT, SUBSEQUENT: ICD-10-CM

## 2021-11-12 LAB
25(OH)D3 SERPL-MCNC: 21.2 NG/ML (ref 30–100)
ALBUMIN SERPL BCP-MCNC: 3.4 G/DL (ref 3.5–5)
ALP SERPL-CCNC: 92 U/L (ref 46–116)
ALT SERPL W P-5'-P-CCNC: 24 U/L (ref 12–78)
ANION GAP SERPL CALCULATED.3IONS-SCNC: 5 MMOL/L (ref 4–13)
AST SERPL W P-5'-P-CCNC: 23 U/L (ref 5–45)
BASOPHILS # BLD AUTO: 0.06 THOUSANDS/ΜL (ref 0–0.1)
BASOPHILS NFR BLD AUTO: 1 % (ref 0–1)
BILIRUB SERPL-MCNC: 0.64 MG/DL (ref 0.2–1)
BUN SERPL-MCNC: 16 MG/DL (ref 5–25)
CALCIUM ALBUM COR SERPL-MCNC: 9.8 MG/DL (ref 8.3–10.1)
CALCIUM SERPL-MCNC: 9.3 MG/DL (ref 8.3–10.1)
CHLORIDE SERPL-SCNC: 107 MMOL/L (ref 100–108)
CHOLEST SERPL-MCNC: 170 MG/DL (ref 50–200)
CO2 SERPL-SCNC: 25 MMOL/L (ref 21–32)
CREAT SERPL-MCNC: 0.74 MG/DL (ref 0.6–1.3)
EOSINOPHIL # BLD AUTO: 0.28 THOUSAND/ΜL (ref 0–0.61)
EOSINOPHIL NFR BLD AUTO: 5 % (ref 0–6)
ERYTHROCYTE [DISTWIDTH] IN BLOOD BY AUTOMATED COUNT: 13.1 % (ref 11.6–15.1)
EST. AVERAGE GLUCOSE BLD GHB EST-MCNC: 105 MG/DL
GFR SERPL CREATININE-BSD FRML MDRD: 75 ML/MIN/1.73SQ M
GLUCOSE P FAST SERPL-MCNC: 101 MG/DL (ref 65–99)
HBA1C MFR BLD: 5.3 %
HCT VFR BLD AUTO: 41.6 % (ref 34.8–46.1)
HDLC SERPL-MCNC: 63 MG/DL
HGB BLD-MCNC: 13.6 G/DL (ref 11.5–15.4)
IMM GRANULOCYTES # BLD AUTO: 0.01 THOUSAND/UL (ref 0–0.2)
IMM GRANULOCYTES NFR BLD AUTO: 0 % (ref 0–2)
LDLC SERPL CALC-MCNC: 89 MG/DL (ref 0–100)
LYMPHOCYTES # BLD AUTO: 1.93 THOUSANDS/ΜL (ref 0.6–4.47)
LYMPHOCYTES NFR BLD AUTO: 36 % (ref 14–44)
MCH RBC QN AUTO: 31.5 PG (ref 26.8–34.3)
MCHC RBC AUTO-ENTMCNC: 32.7 G/DL (ref 31.4–37.4)
MCV RBC AUTO: 96 FL (ref 82–98)
MONOCYTES # BLD AUTO: 0.58 THOUSAND/ΜL (ref 0.17–1.22)
MONOCYTES NFR BLD AUTO: 11 % (ref 4–12)
NEUTROPHILS # BLD AUTO: 2.51 THOUSANDS/ΜL (ref 1.85–7.62)
NEUTS SEG NFR BLD AUTO: 47 % (ref 43–75)
NRBC BLD AUTO-RTO: 0 /100 WBCS
PLATELET # BLD AUTO: 246 THOUSANDS/UL (ref 149–390)
PMV BLD AUTO: 10 FL (ref 8.9–12.7)
POTASSIUM SERPL-SCNC: 4 MMOL/L (ref 3.5–5.3)
PROT SERPL-MCNC: 6.9 G/DL (ref 6.4–8.2)
RBC # BLD AUTO: 4.32 MILLION/UL (ref 3.81–5.12)
SODIUM SERPL-SCNC: 137 MMOL/L (ref 136–145)
T4 FREE SERPL-MCNC: 1.4 NG/DL (ref 0.76–1.46)
TRIGL SERPL-MCNC: 88 MG/DL
TSH SERPL DL<=0.05 MIU/L-ACNC: 1.27 UIU/ML (ref 0.36–3.74)
WBC # BLD AUTO: 5.37 THOUSAND/UL (ref 4.31–10.16)

## 2021-11-12 PROCEDURE — 85025 COMPLETE CBC W/AUTO DIFF WBC: CPT

## 2021-11-12 PROCEDURE — 83036 HEMOGLOBIN GLYCOSYLATED A1C: CPT

## 2021-11-12 PROCEDURE — 80053 COMPREHEN METABOLIC PANEL: CPT

## 2021-11-12 PROCEDURE — 84439 ASSAY OF FREE THYROXINE: CPT

## 2021-11-12 PROCEDURE — 36415 COLL VENOUS BLD VENIPUNCTURE: CPT

## 2021-11-12 PROCEDURE — 82306 VITAMIN D 25 HYDROXY: CPT

## 2021-11-12 PROCEDURE — 80061 LIPID PANEL: CPT

## 2021-11-12 PROCEDURE — 84443 ASSAY THYROID STIM HORMONE: CPT

## 2021-11-15 ENCOUNTER — OFFICE VISIT (OUTPATIENT)
Dept: CARDIOLOGY CLINIC | Facility: CLINIC | Age: 81
End: 2021-11-15
Payer: COMMERCIAL

## 2021-11-15 VITALS
HEIGHT: 65 IN | DIASTOLIC BLOOD PRESSURE: 68 MMHG | BODY MASS INDEX: 31.72 KG/M2 | HEART RATE: 66 BPM | SYSTOLIC BLOOD PRESSURE: 154 MMHG | WEIGHT: 190.4 LBS

## 2021-11-15 DIAGNOSIS — I48.0 PAROXYSMAL ATRIAL FIBRILLATION (HCC): Primary | ICD-10-CM

## 2021-11-15 DIAGNOSIS — I10 BENIGN ESSENTIAL HYPERTENSION: ICD-10-CM

## 2021-11-15 PROCEDURE — 99213 OFFICE O/P EST LOW 20 MIN: CPT | Performed by: INTERNAL MEDICINE

## 2021-11-15 PROCEDURE — 3078F DIAST BP <80 MM HG: CPT | Performed by: INTERNAL MEDICINE

## 2021-11-15 PROCEDURE — 3077F SYST BP >= 140 MM HG: CPT | Performed by: INTERNAL MEDICINE

## 2021-11-15 PROCEDURE — 93000 ELECTROCARDIOGRAM COMPLETE: CPT | Performed by: INTERNAL MEDICINE

## 2021-11-17 ENCOUNTER — VBI (OUTPATIENT)
Dept: ADMINISTRATIVE | Facility: OTHER | Age: 81
End: 2021-11-17

## 2021-11-17 ENCOUNTER — OFFICE VISIT (OUTPATIENT)
Dept: INTERNAL MEDICINE CLINIC | Facility: CLINIC | Age: 81
End: 2021-11-17
Payer: COMMERCIAL

## 2021-11-17 VITALS
HEART RATE: 56 BPM | WEIGHT: 191.2 LBS | OXYGEN SATURATION: 96 % | DIASTOLIC BLOOD PRESSURE: 88 MMHG | HEIGHT: 65 IN | RESPIRATION RATE: 16 BRPM | SYSTOLIC BLOOD PRESSURE: 128 MMHG | BODY MASS INDEX: 31.86 KG/M2

## 2021-11-17 DIAGNOSIS — E03.9 HYPOTHYROIDISM, UNSPECIFIED TYPE: ICD-10-CM

## 2021-11-17 DIAGNOSIS — I10 BENIGN ESSENTIAL HYPERTENSION: ICD-10-CM

## 2021-11-17 DIAGNOSIS — E55.9 VITAMIN D DEFICIENCY: ICD-10-CM

## 2021-11-17 DIAGNOSIS — F43.10 PTSD (POST-TRAUMATIC STRESS DISORDER): ICD-10-CM

## 2021-11-17 DIAGNOSIS — Z00.00 MEDICARE ANNUAL WELLNESS VISIT, SUBSEQUENT: ICD-10-CM

## 2021-11-17 DIAGNOSIS — Z23 ENCOUNTER FOR IMMUNIZATION: Primary | ICD-10-CM

## 2021-11-17 DIAGNOSIS — R73.01 IFG (IMPAIRED FASTING GLUCOSE): ICD-10-CM

## 2021-11-17 DIAGNOSIS — Z23 NEED FOR SHINGLES VACCINE: ICD-10-CM

## 2021-11-17 PROCEDURE — 99213 OFFICE O/P EST LOW 20 MIN: CPT | Performed by: INTERNAL MEDICINE

## 2021-11-17 PROCEDURE — 3725F SCREEN DEPRESSION PERFORMED: CPT | Performed by: INTERNAL MEDICINE

## 2021-11-17 PROCEDURE — 1036F TOBACCO NON-USER: CPT | Performed by: INTERNAL MEDICINE

## 2021-11-17 PROCEDURE — 1101F PT FALLS ASSESS-DOCD LE1/YR: CPT | Performed by: INTERNAL MEDICINE

## 2021-11-17 PROCEDURE — 90662 IIV NO PRSV INCREASED AG IM: CPT

## 2021-11-17 PROCEDURE — 1170F FXNL STATUS ASSESSED: CPT | Performed by: INTERNAL MEDICINE

## 2021-11-17 PROCEDURE — 1125F AMNT PAIN NOTED PAIN PRSNT: CPT | Performed by: INTERNAL MEDICINE

## 2021-11-17 PROCEDURE — 3288F FALL RISK ASSESSMENT DOCD: CPT | Performed by: INTERNAL MEDICINE

## 2021-11-17 PROCEDURE — G0008 ADMIN INFLUENZA VIRUS VAC: HCPCS

## 2021-11-17 PROCEDURE — G0439 PPPS, SUBSEQ VISIT: HCPCS | Performed by: INTERNAL MEDICINE

## 2021-11-17 PROCEDURE — 1160F RVW MEDS BY RX/DR IN RCRD: CPT | Performed by: INTERNAL MEDICINE

## 2021-11-17 RX ORDER — LEVOTHYROXINE SODIUM 88 UG/1
88 TABLET ORAL DAILY
Qty: 90 TABLET | Refills: 1 | Status: SHIPPED | OUTPATIENT
Start: 2021-11-17 | End: 2022-07-21

## 2021-11-17 RX ORDER — ZOSTER VACCINE RECOMBINANT, ADJUVANTED 50 MCG/0.5
0.5 KIT INTRAMUSCULAR ONCE
Qty: 1 EACH | Refills: 1 | Status: SHIPPED | OUTPATIENT
Start: 2021-11-17 | End: 2021-11-17

## 2021-11-18 PROBLEM — R73.01 IFG (IMPAIRED FASTING GLUCOSE): Status: ACTIVE | Noted: 2021-11-18

## 2021-11-18 PROBLEM — F43.10 PTSD (POST-TRAUMATIC STRESS DISORDER): Status: ACTIVE | Noted: 2021-11-18

## 2021-11-18 PROBLEM — E55.9 VITAMIN D DEFICIENCY: Status: ACTIVE | Noted: 2021-11-18

## 2021-11-22 ENCOUNTER — VBI (OUTPATIENT)
Dept: ADMINISTRATIVE | Facility: OTHER | Age: 81
End: 2021-11-22

## 2022-01-11 ENCOUNTER — HOSPITAL ENCOUNTER (OUTPATIENT)
Dept: RADIOLOGY | Age: 82
Discharge: HOME | End: 2022-01-11
Payer: COMMERCIAL

## 2022-01-11 VITALS — BODY MASS INDEX: 29.73 KG/M2 | WEIGHT: 185 LBS | HEIGHT: 66 IN

## 2022-01-11 DIAGNOSIS — Z12.31 ENCOUNTER FOR SCREENING MAMMOGRAM FOR MALIGNANT NEOPLASM OF BREAST: ICD-10-CM

## 2022-01-11 PROCEDURE — 77063 BREAST TOMOSYNTHESIS BI: CPT

## 2022-01-11 PROCEDURE — 77067 SCR MAMMO BI INCL CAD: CPT

## 2022-04-26 ENCOUNTER — RA CDI HCC (OUTPATIENT)
Dept: OTHER | Facility: HOSPITAL | Age: 82
End: 2022-04-26

## 2022-04-26 NOTE — PROGRESS NOTES
[facility]  coding opportunities       Chart reviewed, no opportunity found:   [address]        Patients Insurance     Medicare Insurance: [employer]

## 2022-06-02 ENCOUNTER — OFFICE VISIT (OUTPATIENT)
Dept: INTERNAL MEDICINE CLINIC | Facility: CLINIC | Age: 82
End: 2022-06-02
Payer: COMMERCIAL

## 2022-06-02 VITALS
HEIGHT: 66 IN | SYSTOLIC BLOOD PRESSURE: 142 MMHG | OXYGEN SATURATION: 97 % | WEIGHT: 188.8 LBS | HEART RATE: 85 BPM | DIASTOLIC BLOOD PRESSURE: 72 MMHG | BODY MASS INDEX: 30.34 KG/M2

## 2022-06-02 DIAGNOSIS — T14.8XXA HEMATOMA AND CONTUSION: Primary | ICD-10-CM

## 2022-06-02 DIAGNOSIS — M25.559 HIP PAIN: ICD-10-CM

## 2022-06-02 DIAGNOSIS — R60.0 LEG EDEMA: ICD-10-CM

## 2022-06-02 DIAGNOSIS — R25.1 TREMOR: ICD-10-CM

## 2022-06-02 DIAGNOSIS — I48.0 PAROXYSMAL ATRIAL FIBRILLATION (HCC): ICD-10-CM

## 2022-06-02 PROCEDURE — 99214 OFFICE O/P EST MOD 30 MIN: CPT | Performed by: INTERNAL MEDICINE

## 2022-06-02 PROCEDURE — 3725F SCREEN DEPRESSION PERFORMED: CPT | Performed by: INTERNAL MEDICINE

## 2022-06-02 PROCEDURE — 1036F TOBACCO NON-USER: CPT | Performed by: INTERNAL MEDICINE

## 2022-06-02 PROCEDURE — 1160F RVW MEDS BY RX/DR IN RCRD: CPT | Performed by: INTERNAL MEDICINE

## 2022-06-02 PROCEDURE — 3077F SYST BP >= 140 MM HG: CPT | Performed by: INTERNAL MEDICINE

## 2022-06-02 PROCEDURE — 3078F DIAST BP <80 MM HG: CPT | Performed by: INTERNAL MEDICINE

## 2022-06-03 PROBLEM — M25.559 HIP PAIN: Status: ACTIVE | Noted: 2022-06-03

## 2022-06-03 PROBLEM — R25.1 TREMOR: Status: ACTIVE | Noted: 2022-06-03
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
## 2022-06-03 NOTE — ASSESSMENT & PLAN NOTE
· She reports 3 episodes of this per week which has been going on for over a year now  · If starts to bother her then will increase her beta-blocker dosage as tolerated

## 2022-06-03 NOTE — ASSESSMENT & PLAN NOTE
· Secondary to arthritis  This is mild and controlled with Tylenol     · Referral to physical therapy

## 2022-06-17 ENCOUNTER — EVALUATION (OUTPATIENT)
Dept: PHYSICAL THERAPY | Age: 82
End: 2022-06-17
Payer: COMMERCIAL

## 2022-06-17 DIAGNOSIS — T14.8XXA HEMATOMA AND CONTUSION: ICD-10-CM

## 2022-06-17 DIAGNOSIS — R60.0 LEG EDEMA: Primary | ICD-10-CM

## 2022-06-17 PROCEDURE — 97162 PT EVAL MOD COMPLEX 30 MIN: CPT

## 2022-06-17 PROCEDURE — 97110 THERAPEUTIC EXERCISES: CPT
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
## 2022-06-21 DIAGNOSIS — I10 ESSENTIAL HYPERTENSION: ICD-10-CM

## 2022-06-22 ENCOUNTER — OFFICE VISIT (OUTPATIENT)
Dept: PHYSICAL THERAPY | Age: 82
End: 2022-06-22
Payer: COMMERCIAL

## 2022-06-22 DIAGNOSIS — R60.0 LEG EDEMA: Primary | ICD-10-CM

## 2022-06-22 PROCEDURE — 97110 THERAPEUTIC EXERCISES: CPT

## 2022-06-22 PROCEDURE — 97140 MANUAL THERAPY 1/> REGIONS: CPT
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
## 2022-06-24 DIAGNOSIS — R42 VERTIGO: ICD-10-CM

## 2022-06-24 RX ORDER — MECLIZINE HCL 12.5 MG/1
12.5 TABLET ORAL DAILY PRN
Qty: 30 TABLET | Refills: 0 | Status: SHIPPED | OUTPATIENT
Start: 2022-06-24

## 2022-06-24 RX ORDER — MECLIZINE HCL 12.5 MG/1
12.5 TABLET ORAL DAILY PRN
Qty: 30 TABLET | Refills: 0 | Status: SHIPPED | OUTPATIENT
Start: 2022-06-24 | End: 2022-06-24 | Stop reason: SDUPTHER

## 2022-06-24 NOTE — TELEPHONE ENCOUNTER
Patient called in again, she would please like this medication today  She is very sick & needs it      Meclizine 12 5 mg    [place], [place] - [address], [address]  [id number]

## 2022-06-27 ENCOUNTER — APPOINTMENT (OUTPATIENT)
Dept: PHYSICAL THERAPY | Age: 82
End: 2022-06-27
Payer: COMMERCIAL

## 2022-06-29 ENCOUNTER — TELEPHONE (OUTPATIENT)
Dept: INTERNAL MEDICINE CLINIC | Facility: CLINIC | Age: 82
End: 2022-06-29

## 2022-06-29 ENCOUNTER — OFFICE VISIT (OUTPATIENT)
Dept: PHYSICAL THERAPY | Age: 82
End: 2022-06-29
Payer: COMMERCIAL

## 2022-06-29 DIAGNOSIS — T14.8XXA HEMATOMA AND CONTUSION: ICD-10-CM

## 2022-06-29 DIAGNOSIS — R42 VERTIGO: Primary | ICD-10-CM

## 2022-06-29 DIAGNOSIS — R60.0 LEG EDEMA: Primary | ICD-10-CM

## 2022-06-29 PROCEDURE — 97110 THERAPEUTIC EXERCISES: CPT

## 2022-06-29 PROCEDURE — 97140 MANUAL THERAPY 1/> REGIONS: CPT
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
## 2022-06-29 NOTE — TELEPHONE ENCOUNTER
Olinda Castañeda is requesting a script for PT, Evaluation and treat for diagnosis vertigo            Please advise

## 2022-07-05 ENCOUNTER — OFFICE VISIT (OUTPATIENT)
Dept: PHYSICAL THERAPY | Age: 82
End: 2022-07-05
Payer: COMMERCIAL

## 2022-07-05 DIAGNOSIS — R60.0 LEG EDEMA: Primary | ICD-10-CM

## 2022-07-05 PROCEDURE — 97110 THERAPEUTIC EXERCISES: CPT

## 2022-07-05 PROCEDURE — 97140 MANUAL THERAPY 1/> REGIONS: CPT
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
## 2022-07-07 ENCOUNTER — OFFICE VISIT (OUTPATIENT)
Dept: PHYSICAL THERAPY | Age: 82
End: 2022-07-07
Payer: COMMERCIAL

## 2022-07-07 DIAGNOSIS — R60.0 LEG EDEMA: Primary | ICD-10-CM

## 2022-07-07 PROCEDURE — 97110 THERAPEUTIC EXERCISES: CPT

## 2022-07-07 PROCEDURE — 97140 MANUAL THERAPY 1/> REGIONS: CPT
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
## 2022-07-11 ENCOUNTER — OFFICE VISIT (OUTPATIENT)
Dept: PHYSICAL THERAPY | Age: 82
End: 2022-07-11
Payer: COMMERCIAL

## 2022-07-11 DIAGNOSIS — R60.0 LEG EDEMA: Primary | ICD-10-CM

## 2022-07-11 PROCEDURE — 97140 MANUAL THERAPY 1/> REGIONS: CPT

## 2022-07-11 PROCEDURE — 97110 THERAPEUTIC EXERCISES: CPT

## 2022-07-14 ENCOUNTER — OFFICE VISIT (OUTPATIENT)
Dept: PHYSICAL THERAPY | Age: 82
End: 2022-07-14
Payer: COMMERCIAL

## 2022-07-14 DIAGNOSIS — R60.0 LEG EDEMA: Primary | ICD-10-CM

## 2022-07-14 PROCEDURE — 97140 MANUAL THERAPY 1/> REGIONS: CPT

## 2022-07-14 PROCEDURE — 97110 THERAPEUTIC EXERCISES: CPT
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
## 2022-07-21 ENCOUNTER — OFFICE VISIT (OUTPATIENT)
Dept: PHYSICAL THERAPY | Age: 82
End: 2022-07-21
Payer: COMMERCIAL

## 2022-07-21 DIAGNOSIS — R60.0 LEG EDEMA: ICD-10-CM

## 2022-07-21 DIAGNOSIS — I89.0 LYMPHEDEMA: Primary | ICD-10-CM

## 2022-07-21 DIAGNOSIS — E03.9 HYPOTHYROIDISM, UNSPECIFIED TYPE: ICD-10-CM

## 2022-07-21 PROCEDURE — 97016 VASOPNEUMATIC DEVICE THERAPY: CPT

## 2022-07-21 RX ORDER — LEVOTHYROXINE SODIUM 88 UG/1
TABLET ORAL
Qty: 90 TABLET | Refills: 0 | Status: SHIPPED | OUTPATIENT
Start: 2022-07-21 | End: 2022-10-21
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
## 2022-07-26 ENCOUNTER — OFFICE VISIT (OUTPATIENT)
Dept: PHYSICAL THERAPY | Age: 82
End: 2022-07-26
Payer: COMMERCIAL

## 2022-07-26 DIAGNOSIS — I89.0 LYMPHEDEMA: Primary | ICD-10-CM

## 2022-07-26 PROCEDURE — 97140 MANUAL THERAPY 1/> REGIONS: CPT

## 2022-07-26 PROCEDURE — 97016 VASOPNEUMATIC DEVICE THERAPY: CPT

## 2022-07-26 PROCEDURE — 97110 THERAPEUTIC EXERCISES: CPT
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
## 2022-07-28 ENCOUNTER — OFFICE VISIT (OUTPATIENT)
Dept: PHYSICAL THERAPY | Age: 82
End: 2022-07-28
Payer: COMMERCIAL

## 2022-07-28 DIAGNOSIS — I89.0 LYMPHEDEMA: Primary | ICD-10-CM

## 2022-07-28 PROCEDURE — 97016 VASOPNEUMATIC DEVICE THERAPY: CPT

## 2022-07-28 PROCEDURE — 97110 THERAPEUTIC EXERCISES: CPT

## 2022-07-28 PROCEDURE — 97140 MANUAL THERAPY 1/> REGIONS: CPT
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
## 2022-08-01 ENCOUNTER — OFFICE VISIT (OUTPATIENT)
Dept: PHYSICAL THERAPY | Age: 82
End: 2022-08-01
Payer: COMMERCIAL

## 2022-08-01 DIAGNOSIS — I89.0 LYMPHEDEMA: Primary | ICD-10-CM

## 2022-08-01 PROCEDURE — 97016 VASOPNEUMATIC DEVICE THERAPY: CPT

## 2022-08-01 PROCEDURE — 97110 THERAPEUTIC EXERCISES: CPT
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
## 2022-08-03 ENCOUNTER — OFFICE VISIT (OUTPATIENT)
Dept: PHYSICAL THERAPY | Age: 82
End: 2022-08-03
Payer: COMMERCIAL

## 2022-08-03 DIAGNOSIS — I89.0 LYMPHEDEMA: Primary | ICD-10-CM

## 2022-08-03 PROCEDURE — 97110 THERAPEUTIC EXERCISES: CPT

## 2022-08-03 PROCEDURE — 97016 VASOPNEUMATIC DEVICE THERAPY: CPT

## 2022-08-04 NOTE — PROGRESS NOTES
PT Re-Evaluation     Today's date: 8/3/2022  Patient name: Lyla Manley  : 1940  MRN: [id number]  Referring provider: Yoni Lilly MD  Dx:   Encounter Diagnosis     ICD-10-CM    1  Lymphedema  I89 0                   Assessment/Plan    Subjective Evaluation    History of Present Illness  Mechanism of injury: Deric Tillman had progress in left le girth reduction and slight reduction in fibrotic scar tissue             Not a recurrent problem   Quality of life: good          Objective           Precautions: see ieval for allergies      Manuals 8/3/22            Mld massage and soft tissue mobilization left le               issued additional silicone gel patch for left le                                      Neuro Re-Ed             Nu step  15 min r 5                                                                                          Ther Ex                                                                                                                     Ther Activity                                       Gait Training                                       Modalities             Left le lymphedema compression pump 40 mmHg 45 min

## 2022-10-21 DIAGNOSIS — E03.9 HYPOTHYROIDISM, UNSPECIFIED TYPE: ICD-10-CM

## 2022-10-21 RX ORDER — LEVOTHYROXINE SODIUM 88 UG/1
TABLET ORAL
Qty: 90 TABLET | Refills: 0 | Status: SHIPPED | OUTPATIENT
Start: 2022-10-21

## 2022-11-07 ENCOUNTER — OFFICE VISIT (OUTPATIENT)
Dept: DERMATOLOGY | Facility: CLINIC | Age: 82
End: 2022-11-07

## 2022-11-07 VITALS — TEMPERATURE: 98.6 F

## 2022-11-07 DIAGNOSIS — L82.1 SEBORRHEIC KERATOSIS: ICD-10-CM

## 2022-11-07 DIAGNOSIS — Z86.007 HISTORY OF SQUAMOUS CELL CARCINOMA IN SITU (SCCIS): ICD-10-CM

## 2022-11-07 DIAGNOSIS — L57.0 KERATOSIS, ACTINIC: Primary | ICD-10-CM

## 2022-11-07 RX ORDER — FLUOROURACIL 50 MG/G
CREAM TOPICAL
Qty: 40 G | Refills: 0 | Status: SHIPPED | OUTPATIENT
Start: 2022-11-07

## 2022-11-07 NOTE — PROGRESS NOTES
[facility]     Patient Name: Mine Taveras  Encounter Date: 11/7/2022    Have you been cared for by a [place] [address] Dermatologist in the last 3 years and, if so, which description applies to you? NO  I am considered a "new" patient and must complete all patient intake questions  I am FEMALE/of child-bearing potential     REVIEW OF SYSTEMS:  Have you recently had or currently have any of the following? · Recent fever or chills? No  · Any non-healing wound? YES, Nose  · Are you pregnant or planning to become pregnant? No  · Are you currently or planning to be nursing or breast feeding? No   PAST MEDICAL HISTORY:  Have you personally ever had or currently have any of the following? If "YES," then please provide more detail  · Skin cancer (such as Melanoma, Basal Cell Carcinoma, Squamous Cell Carcinoma? YES, SCCIS of Right upper anterior shin  · Tuberculosis, HIV/AIDS, Hepatitis B or C: No  · Systemic Immunosuppression such as Diabetes, Biologic or Immunotherapy, Chemotherapy, Organ Transplantation, Bone Marrow Transplantation No  · Radiation Treatment No   FAMILY HISTORY:  Any "first degree relatives" (parent, brother, sister, or child) with the following? • Skin Cancer, Pancreatic or Other Cancer? No   PATIENT EXPERIENCE:    • Do you want the Dermatologist to perform a COMPLETE skin exam today including a clinical examination under the "bra and underwear" areas? NO  • If necessary, do we have your permission to call and leave a detailed message on your Preferred Phone number that includes your specific medical information? Yes      Allergies   Allergen Reactions   • Dust Mite Extract Allergic Rhinitis   • Fluticasone-Salmeterol Hives     Category: Allergy;    • Molds & Smuts Allergic Rhinitis   • Morphine Nausea Only   • Penicillins Hives     Category:  Allergy;    • Pollen Extract Allergic Rhinitis, Cough, Dermatitis and Itching   • Latex Rash      Current Outpatient Medications:   • Acetaminophen 650 MG/20 3ML SOLN, Take 650 mg by mouth every 4 (four) hours as needed , Disp: , Rfl:   •  Ascorbic Acid (VITAMIN C) 500 MG CAPS, Take by mouth, Disp: , Rfl:   •  aspirin 81 MG tablet, Take 1 tablet by mouth daily, Disp: , Rfl:   •  Calcium Carb-Cholecalciferol (CALTRATE 600+D) 600-800 MG-UNIT TABS, Take by mouth, Disp: , Rfl:   •  Cholecalciferol (VITAMIN D-3) 1000 units CAPS, Take by mouth, Disp: , Rfl:   •  Glucosamine 750 MG TABS, Take by mouth, Disp: , Rfl:   •  levothyroxine 88 mcg tablet, Take 1 tablet by mouth once daily, Disp: 90 tablet, Rfl: 0  •  Loratadine 10 MG CAPS, Take by mouth daily (Patient not taking: Reported on 6/2/2022), Disp: , Rfl:   •  loratadine-pseudoephedrine (CLARITIN-D 24-HOUR)  mg per 24 hr tablet, Take 1 tablet by mouth daily  , Disp: , Rfl:   •  meclizine (ANTIVERT) 12 5 MG tablet, Take 1 tablet (12 5 mg total) by mouth daily as needed for dizziness, Disp: 30 tablet, Rfl: 0  •  metoprolol tartrate (LOPRESSOR) 25 mg tablet, TAKE ONE TABLET BY MOUTH EVERY DAY, Disp: 90 tablet, Rfl: 1  •  Multiple Vitamins-Minerals (CENTRUM SILVER ULTRA WOMENS) TABS, Take by mouth, Disp: , Rfl:   •  triamterene-hydrochlorothiazide (MAXZIDE-25) 37 5-25 mg per tablet, Take 1 tablet by mouth daily (Patient not taking: Reported on 6/2/2022), Disp: 90 tablet, Rfl: 1          • Whom besides the patient is providing clinical information about today's encounter?   o Patients     Physical Exam and Assessment/Plan by Diagnosis:      1  HISTORY OF SQUAMOUS CELL CARCINOMA IN SITU    Physical Exam:  • Anatomic Location Affected:  Left nasal tip, Right upper anterior shin  • NER     Additional History of Present Condition:  Patient was treated with Efudex cream to treat the nose and shin    Assessment and Plan:  Based on a thorough discussion of this condition and the management approach to it (including a comprehensive discussion of the known risks, side effects and potential benefits of treatment), the patient (family) agrees to implement the following specific plan:    • Patient completed Fluorouracil 5% cream treatment  How can SCC be prevented? The most important way to prevent SCC is to avoid sunburn  This is especially important in childhood and early life  Fair skinned individuals and those with a personal or family history of BCC should protect their skin from sun exposure daily, year-round and lifelong  • Stay indoors or under the shade in the middle of the day   • Wear covering clothing   • Apply high protection factor SPF50+ broad-spectrum sunscreens generously to exposed skin if outdoors   • Avoid indoor tanning (sun beds, solaria)      What is the outlook for SCC? Most SCC are cured by treatment  Cure is most likely if treatment is undertaken when the lesion is small  A small percent of SCC's can spread to lymph  nodes and long term monitoring is indicated  They are also at increased risk of other skin cancers, especially melanoma  Regular self-skin examinations and long-term annual skin checks by an experienced health professional are recommended  2  SEBORRHEIC KERATOSES  - Relevant exam: On  The right posterior shoulder is a brown to skin colored plaque with stuck on appearance   - Exam and clinical history consistent with seborrheic keratoses  - Counseled that these are benign growths that do not require treatment  - Counseled that removal of lesions is considered cosmetic and so would incur a fee should patient elect to move forward  - Patient to hold on treatments for now but will inform us should they desire additional treatments    3  ACTINIC KERATOSES  - Relevant exam: On the Right nasal bridge, Right forehead, and Left lower shin are gritty pink papules  - Exam and clinical history consistent with actinic keratoses  - Discussed that these lesions are considered premalignant with the potential to evolve into squamous cell carcinoma     - Discussed that these are due to chronic sun exposure and therefore recommend use of sunscreen/sun protection to prevent further sun damage  - Discussed treatment options, including liquid nitrogen destruction, topical immunotherapy, and photodynamic therapy, including risks, benefits    - The patient agreed to treatment with topical efudex 5% for 5 days BID to the face; Right nasal bridge(Nose) and Right forehead  - Common side effects for this treatment were discussed   - Educated could hold on this until after the holidays      PROCEDURE:  DESTRUCTION OF PRE-MALIGNANT LESIONS    - After a thorough discussion of treatment options and risk/benefits/alternatives (including but not limited to local pain, scarring, dyspigmentation, blistering, and possible superinfection), verbal and written consent were obtained and the aforementioned lesions were treated on with cryotherapy using liquid nitrogen x 1 cycle for 5-10 seconds  • TOTAL NUMBER of 1 pre-malignant lesions were treated today on the ANATOMIC LOCATION: Left lower shin  The patient tolerated the procedure well, and after-care instructions were provided  Educated patient that [address] would be recommended for her given history of NMSC  Patient wishes to make return appt for location closer to home if possible        Scribe Attestation    I,:  Ferny Elder am acting as a scribe while in the presence of the attending physician :       I,:  Lisa Cody MD personally performed the services described in this documentation    as scribed in my presence :

## 2022-11-07 NOTE — PATIENT INSTRUCTIONS
SEBORRHEIC KERATOSIS; NON-INFLAMED    Assessment and Plan:  Based on a thorough discussion of this condition and the management approach to it (including a comprehensive discussion of the known risks, side effects and potential benefits of treatment), the patient (family) agrees to implement the following specific plan:    Benign; reassured    Seborrheic Keratosis  A seborrheic keratosis is a harmless warty spot that appears during adult life as a common sign of skin aging  Seborrheic keratoses can arise on any area of skin, covered or uncovered, with the usual exception of the palms and soles  They do not arise from mucous membranes  Seborrheic keratoses can have highly variable appearance  Seborrheic keratoses are extremely common  It has been estimated that over 90% of adults over the age of 61 years have one or more of them  They occur in males and females of all races, typically beginning to erupt in the 35s or 45s  They are uncommon under the age of 21 years  The precise cause of seborrhoeic keratoses is not known  Seborrhoeic keratoses are considered degenerative in nature  As time goes by, seborrheic keratoses tend to become more numerous  Some people inherit a tendency to develop a very large number of them; some people may have hundreds of them  The name "seborrheic keratosis" is misleading, because these lesions are not limited to a seborrhoeic distribution (scalp, mid-face, chest, upper back), nor are they formed from sebaceous glands, nor are they associated with sebum -- which is greasy    Seborrheic keratosis may also be called "SK," "Seb K," "basal cell papilloma," "senile wart," or "barnacle "      Researchers have noted:  Eruptive seborrhoeic keratoses can follow sunburn or dermatitis  Skin friction may be the reason they appear in body folds  Viral cause (e g , human papillomavirus) seems unlikely  Stable and clonal mutations or activation of FRFR3, PIK3CA, EJ, AKT1 and EGFR genes are found in seborrhoeic keratoses  Seborrhoeic keratosis can arise from solar lentigo  FRFR3 mutations also arise in solar lentigines  These mutations are associated with increased age and location on the head and neck, suggesting a role of ultraviolet radiation in these lesions  Seborrheic keratoses do not harbour tumour suppressor gene mutations  Epidermal growth factor receptor inhibitors, which are used to treat some cancers, often result in an increase in verrucal (warty) keratoses  There is no easy way to remove multiple lesions on a single occasion  Unless a specific lesion is "inflamed" and is causing pain or stinging/burning or is bleeding, most insurance companies do not authorize treatment  ACTINIC KERATOSIS      Assessment and Plan:  Based on a thorough discussion of this condition and the management approach to it (including a comprehensive discussion of the known risks, side effects and potential benefits of treatment), the patient (family) agrees to implement the following specific plan:    Start to use Efudex 5% cream  Apply twice a day for 5 days to the affected areas on the face: Right nasal bridge(Nose) and Right forehead  Today in the office cryotherapy was used to treat the left lower shin for a pre-cancerous spot  Cryotherapy will make the spot red, irritated, blister and crust up  The crusting should fall off within a week or two after treatment  Consent was singed and obtained  Apply Vaseline and a Band-Aid  to the left lower shin before applying compression stockings  Actinic keratoses are very common on sites repeatedly exposed to the sun, especially the backs of the hands and the face, most often affecting the ears, nose, cheeks, upper lip, vermilion of the lower lip, temples, forehead and balding scalp  In severely chronically sun-damaged individuals, they may also be found on the upper trunk, upper and lower limbs, and dorsum of feet      We discussed the theoretical premalignant (“pre-cancerous”) nature and etiology of these growths  We discussed the prevailing notion that actinic keratoses are a reflection of abnormal skin cell development due to DNA damage by short wavelength UVB  They are more likely to appear if the immune function is poor, due to aging, recent sun exposure, predisposing disease or certain drugs  We discussed that the main concern is that actinic keratoses may predispose to squamous cell carcinoma  It is rare for a solitary actinic keratosis to evolve to squamous cell carcinoma (SCC), but the risk of SCC occurring at some stage in a patient with more than 10 actinic keratoses is thought to be about 10 to 15%  A tender, thickened, ulcerated or enlarging actinic keratosis is suspicious of SCC  Actinic keratoses may be prevented by strict sun protection  If already present, keratoses may improve with a very high sun protection factor (50+) broad-spectrum sunscreen applied at least daily to affected areas, year-round  We recommend that UPF-rated clothing and hats and sunglasses be worn whenever possible and that a sunscreen-moisturizer combination product such as Neutrogena Daily Defense be applied at least three times a day  We performed a thorough discussion of treatment options and specific risk/benefits/alternatives including but not limited to medical “field” treatment with medications such as the following:    Topical “field area” medications such as 5-fluorouracil or Aldara (specifically, the trouble with long-term compliance, blistering and local skin reaction versus the convenience of at-home therapy and that field therapy “gets what is not yet seen”)  Cryotherapy (specifically, local pain, scarring, dyspigmentation, blistering, possible superinfection, and treats “only what we see” versus directed treatment today)      Photodynamic therapy (specifically, local pain, scarring, dyspigmentation, blistering, possible superinfection, need to schedule for a later date, and time spent in the office versus field therapy that “gets what is not yet seen”)

## 2022-11-14 ENCOUNTER — APPOINTMENT (OUTPATIENT)
Dept: LAB | Age: 82
End: 2022-11-14

## 2022-11-14 DIAGNOSIS — E55.9 VITAMIN D DEFICIENCY: ICD-10-CM

## 2022-11-14 DIAGNOSIS — R73.01 IFG (IMPAIRED FASTING GLUCOSE): ICD-10-CM

## 2022-11-14 LAB
25(OH)D3 SERPL-MCNC: 20 NG/ML (ref 30–100)
ALBUMIN SERPL BCP-MCNC: 3.5 G/DL (ref 3.5–5)
ALP SERPL-CCNC: 90 U/L (ref 46–116)
ALT SERPL W P-5'-P-CCNC: 26 U/L (ref 12–78)
ANION GAP SERPL CALCULATED.3IONS-SCNC: 1 MMOL/L (ref 4–13)
AST SERPL W P-5'-P-CCNC: 21 U/L (ref 5–45)
BILIRUB SERPL-MCNC: 0.59 MG/DL (ref 0.2–1)
BUN SERPL-MCNC: 13 MG/DL (ref 5–25)
CALCIUM SERPL-MCNC: 9.6 MG/DL (ref 8.3–10.1)
CHLORIDE SERPL-SCNC: 109 MMOL/L (ref 96–108)
CO2 SERPL-SCNC: 29 MMOL/L (ref 21–32)
CREAT SERPL-MCNC: 0.71 MG/DL (ref 0.6–1.3)
EST. AVERAGE GLUCOSE BLD GHB EST-MCNC: 111 MG/DL
GFR SERPL CREATININE-BSD FRML MDRD: 78 ML/MIN/1.73SQ M
GLUCOSE P FAST SERPL-MCNC: 101 MG/DL (ref 65–99)
HBA1C MFR BLD: 5.5 %
POTASSIUM SERPL-SCNC: 4.4 MMOL/L (ref 3.5–5.3)
PROT SERPL-MCNC: 6.9 G/DL (ref 6.4–8.4)
SODIUM SERPL-SCNC: 139 MMOL/L (ref 135–147)

## 2022-11-18 ENCOUNTER — OFFICE VISIT (OUTPATIENT)
Dept: INTERNAL MEDICINE CLINIC | Facility: CLINIC | Age: 82
End: 2022-11-18

## 2022-11-18 VITALS
BODY MASS INDEX: 29.77 KG/M2 | OXYGEN SATURATION: 96 % | DIASTOLIC BLOOD PRESSURE: 82 MMHG | HEART RATE: 66 BPM | HEIGHT: 66 IN | SYSTOLIC BLOOD PRESSURE: 122 MMHG | RESPIRATION RATE: 16 BRPM | WEIGHT: 185.2 LBS

## 2022-11-18 DIAGNOSIS — Z23 ENCOUNTER FOR IMMUNIZATION: Primary | ICD-10-CM

## 2022-11-18 DIAGNOSIS — E66.09 CLASS 1 OBESITY DUE TO EXCESS CALORIES WITHOUT SERIOUS COMORBIDITY WITH BODY MASS INDEX (BMI) OF 30.0 TO 30.9 IN ADULT: ICD-10-CM

## 2022-11-18 DIAGNOSIS — I10 BENIGN ESSENTIAL HYPERTENSION: ICD-10-CM

## 2022-11-18 DIAGNOSIS — E03.9 HYPOTHYROIDISM, UNSPECIFIED TYPE: ICD-10-CM

## 2022-11-18 DIAGNOSIS — Z13.1 SCREENING FOR DIABETES MELLITUS: ICD-10-CM

## 2022-11-18 DIAGNOSIS — Z13.6 SCREENING FOR CARDIOVASCULAR CONDITION: ICD-10-CM

## 2022-11-18 DIAGNOSIS — R89.9 ABNORMAL LABORATORY TEST: ICD-10-CM

## 2022-11-18 DIAGNOSIS — Z00.00 MEDICARE ANNUAL WELLNESS VISIT, SUBSEQUENT: ICD-10-CM

## 2022-11-18 DIAGNOSIS — R73.01 IFG (IMPAIRED FASTING GLUCOSE): ICD-10-CM

## 2022-11-18 RX ORDER — LIFITEGRAST 50 MG/ML
1 SOLUTION/ DROPS OPHTHALMIC 2 TIMES DAILY
COMMUNITY
Start: 2022-11-16

## 2022-11-18 NOTE — PROGRESS NOTES
Assessment and Plan:     Problem List Items Addressed This Visit        Endocrine    Hypothyroidism     Hypothyroidism controlled the patient is currently euthyroid I will be ordering a TSH prior to the next office visit and the patient will continue with current medical regiment; we will continue to monitor the patient's progress  Continue levothyroxine 88 mcg once daily         Relevant Orders    TSH, 3rd generation with Free T4 reflex    IFG (impaired fasting glucose)     Pre diabetes -I have counseled the patient to follow a healthy balanced diet, I have counseled patient reduce carbohydrates and sweets in the diet, I would like the patient exercise routinely  I will be checking hemoglobin A1c and comprehensive metabolic panel  Have counseled patient about the prevention of diabetes, and the risk of progression to type 2 diabetes  Relevant Orders    Hemoglobin A1C       Cardiovascular and Mediastinum    Benign essential hypertension     Hypertension - controlled, I have counseled patient following healthy balance diet, I would like the patient reduce sodium, exercise routinely, I would like the patient continued the med current medical regiment and we will continue to monitor  Blood pressure today 122/82; continue Lopressor, mazide            Other    Medicare annual wellness visit, subsequent     Assessment and plan 1  Medicare subsequent annual wellness examination overall the patient is clinically stable and doing well, we encouraged the patient to follow a healthy and balanced diet  We recommend that the patient exercise routinely approximately 30 minutes 5 times per week   We have reviewed the patient's vaccines and have made recommendations for updates if necessary  influenza vaccine      We will be ordering screening laboratories which are age appropriate  Return to the office in    6 months  call if any problems           Screening for diabetes mellitus    Class 1 obesity due to excess calories with body mass index (BMI) of 30 0 to 30 9 in adult     Obesity -I have counseled patient following healthy and balanced diet, I would like the patient to lose weight, I would like the patient exercise routinely; we will continue monitor the patient's progress  Other Visit Diagnoses     Encounter for immunization    -  Primary    Relevant Orders    influenza vaccine, high-dose, PF 0 7 mL (FLUZONE HIGH-DOSE) (Completed)    Screening for cardiovascular condition        Relevant Orders    Comprehensive metabolic panel    Lipid Panel with Direct LDL reflex    Abnormal laboratory test        Relevant Orders    Protein electrophoresis, serum    Protein electrophoresis, urine      RTO in 6 months call if any problems, consider carbon monoxide monitor consider completion of living will  BMI Counseling: Body mass index is 30 35 kg/m²  The BMI is above normal  Nutrition recommendations include decreasing portion sizes and moderation in carbohydrate intake  Exercise recommendations include exercising 3-5 times per week  Rationale for BMI follow-up plan is due to patient being overweight or obese  Preventive health issues were discussed with patient, and age appropriate screening tests were ordered as noted in patient's After Visit Summary  Personalized health advice and appropriate referrals for health education or preventive services given if needed, as noted in patient's After Visit Summary  History of Present Illness:     Patient presents for a Medicare Wellness Visit    HPI 78-year old female coming in for a follow up office visit regarding impaired fasting glucose, abnormal laboratory tests, hypothyroidism, benign essential hypertension, obesity; The patient reports me compliant taking medications without untoward side effects the  The patient is here to review his medical condition, update me on the medical condition and the patient reports me no hospitalizations and no ER visits    No injuries no illnesses overall doing well trying to follow healthy/balance diet here to review laboratories in detail  Patient Care Team:  Luis Farley DO as PCP - MD Rakel Crawford MD     Review of Systems:     Review of Systems   Constitutional: Negative for activity change, appetite change and unexpected weight change  HENT: Negative for congestion  Eyes: Negative for visual disturbance  Respiratory: Negative for cough and shortness of breath  Cardiovascular: Negative for chest pain  Gastrointestinal: Negative for abdominal pain, diarrhea, nausea and vomiting  Neurological: Negative for dizziness, light-headedness and headaches  Problem List:     Patient Active Problem List   Diagnosis   • Atrial fibrillation ([facility] [address] )   • Benign essential hypertension   • Hypothyroidism   • Medicare annual wellness visit, subsequent   • Preoperative clearance   • Age-related cataract of both eyes   • Preop examination   • Age-related cataract   • Essential hypertension   • Screening for diabetes mellitus   • Screening for osteoporosis   • Meniere's disease   • Oral candida   • Oral ulcer   • Needs flu shot   • Snoring   • Hematoma and contusion   • Hematoma of left lower leg   • Vitamin D deficiency   • IFG (impaired fasting glucose)   • PTSD (post-traumatic stress disorder)   • Hip pain   • Intentional tremor   • Class 1 obesity due to excess calories with body mass index (BMI) of 30 0 to 30 9 in adult      Past Medical and Surgical History:     Past Medical History:   Diagnosis Date   • Arthritis     "Everywhere"OA   • Atrial fibrillation ([facility] [address] )    • HL (hearing loss)    • Hypokalemia     last assessed: 4/25/2015   • Meniere's disease     Dx 40 yrs ago per pt     • Squamous cell skin cancer     (SCCIS)     Past Surgical History:   Procedure Laterality Date   • HYSTERECTOMY      partial  age 30      Family History:     Family History   Problem Relation Age of Onset   • Dementia Mother    • Colon cancer Father 80   • No Known Problems Sister    • No Known Problems Daughter    • No Known Problems Daughter    • No Known Problems Maternal Aunt    • No Known Problems Paternal Aunt    • No Known Problems Paternal Aunt    • No Known Problems Paternal Aunt    • Brain cancer Half-Sister       Social History:     Social History     Socioeconomic History   • Marital status: /Civil Union     Spouse name: None   • Number of children: None   • Years of education: None   • Highest education level: None   Occupational History   • None   Tobacco Use   • Smoking status: Former     Types: Cigarettes     Quit date:      Years since quittin 9   • Smokeless tobacco: Never   • Tobacco comments:     former smoker   Vaping Use   • Vaping Use: Never used   Substance and Sexual Activity   • Alcohol use: No     Comment: never drank alcohol   • Drug use: No   • Sexual activity: Not Currently   Other Topics Concern   • None   Social History Narrative   • None     Social Determinants of Health     Financial Resource Strain: Low Risk    • Difficulty of Paying Living Expenses: Not hard at all   Food Insecurity: Not on file   Transportation Needs: No Transportation Needs   • Lack of Transportation (Medical): No   • Lack of Transportation (Non-Medical):  No   Physical Activity: Not on file   Stress: Not on file   Social Connections: Not on file   Intimate Partner Violence: Not on file   Housing Stability: Not on file      Medications and Allergies:     Current Outpatient Medications   Medication Sig Dispense Refill   • Acetaminophen 650 MG/20 3ML SOLN Take 650 mg by mouth every 4 (four) hours as needed      • Ascorbic Acid (VITAMIN C) 500 MG CAPS Take by mouth     • aspirin 81 MG tablet Take 1 tablet by mouth daily     • Calcium Carb-Cholecalciferol (CALTRATE 600+D) 600-800 MG-UNIT TABS Take by mouth     • Cholecalciferol (VITAMIN D-3) 1000 units CAPS Take by mouth     • fluorouracil (EFUDEX) 5 % cream Apply twice a day for  5 days to red spots on  the nose and forehead  May wait until after the holidays 40 g 0   • Glucosamine 750 MG TABS Take by mouth     • levothyroxine 88 mcg tablet Take 1 tablet by mouth once daily 90 tablet 0   • loratadine-pseudoephedrine (CLARITIN-D 24-HOUR)  mg per 24 hr tablet Take 1 tablet by mouth daily       • meclizine (ANTIVERT) 12 5 MG tablet Take 1 tablet (12 5 mg total) by mouth daily as needed for dizziness 30 tablet 0   • metoprolol tartrate (LOPRESSOR) 25 mg tablet TAKE ONE TABLET BY MOUTH EVERY DAY 90 tablet 1   • Multiple Vitamins-Minerals (CENTRUM SILVER ULTRA WOMENS) TABS Take by mouth     • Xiidra 5 % op solution Administer 1 drop to both eyes 2 (two) times a day     • Loratadine 10 MG CAPS Take by mouth daily (Patient not taking: Reported on 6/2/2022)     • triamterene-hydrochlorothiazide (MAXZIDE-25) 37 5-25 mg per tablet Take 1 tablet by mouth daily (Patient not taking: Reported on 6/2/2022) 90 tablet 1     No current facility-administered medications for this visit  Allergies   Allergen Reactions   • Dust Mite Extract Allergic Rhinitis   • Fluticasone-Salmeterol Hives     Category: Allergy;    • Molds & Smuts Allergic Rhinitis   • Morphine Nausea Only   • Penicillins Hives     Category:  Allergy;    • Pollen Extract Allergic Rhinitis, Cough, Dermatitis and Itching   • Latex Rash      Immunizations:     Immunization History   Administered Date(s) Administered   • COVID-19 PFIZER VACCINE 0 3 ML IM 01/18/2021, 02/08/2021   • INFLUENZA 10/05/2018   • Influenza Split High Dose Preservative Free IM 08/17/2015   • Influenza, high dose seasonal 0 7 mL 11/04/2019, 10/07/2020, 11/17/2021, 11/18/2022   • Influenza, seasonal, injectable 11/26/2012, 09/23/2015   • Influenza, seasonal, injectable, preservative free 09/23/2016   • Pneumococcal Conjugate 13-Valent 08/18/2015   • Pneumococcal Polysaccharide PPV23 07/22/2003   • Td (adult), adsorbed 07/22/2005   • Zoster 02/25/2013   • influenza, trivalent, adjuvanted 10/05/2018      Health Maintenance:         Topic Date Due   • Colorectal Cancer Screening  05/10/2023         Topic Date Due   • Hepatitis B Vaccine (1 of 3 - 3-dose series) Never done   • Pneumococcal Vaccine: 65+ Years (3) 08/18/2016   • COVID-19 Vaccine (3 - Booster for Pfizer series) 07/08/2021      Medicare Screening Tests and Risk Assessments: Hodan Doran is here for her Subsequent Wellness visit  Health Risk Assessment:   Patient rates overall health as good  Patient feels that their physical health rating is same  Patient is satisfied with their life  Eyesight was rated as same  Hearing was rated as same  Patient feels that their emotional and mental health rating is same  Patients states they are never, rarely angry  Patient states they are never, rarely unusually tired/fatigued  Pain experienced in the last 7 days has been none  Patient states that she has experienced no weight loss or gain in last 6 months  Left left hip and leg pain    Fall Risk Screening: In the past year, patient has experienced: no history of falling in past year      Urinary Incontinence Screening:   Patient has not leaked urine accidently in the last six months  Home Safety:  Patient does not have trouble with stairs inside or outside of their home  Patient has working smoke alarms and has no working carbon monoxide detector  Home safety hazards include: none  Nutrition:   Current diet is Regular  Medications:   Patient is currently taking over-the-counter supplements  OTC medications include: see medication list  Patient is able to manage medications  Activities of Daily Living (ADLs)/Instrumental Activities of Daily Living (IADLs):   Walk and transfer into and out of bed and chair?: Yes  Dress and groom yourself?: Yes    Bathe or shower yourself?: Yes    Feed yourself?  Yes  Do your laundry/housekeeping?: Yes  Manage your money, pay your bills and track your expenses?: Yes  Make your own meals?: Yes    Do your own shopping?: Yes    Previous Hospitalizations:   Any hospitalizations or ED visits within the last 12 months?: No      Advance Care Planning:   Living will: No    Durable POA for healthcare: No    Advanced directive: No      Cognitive Screening:   Provider or family/friend/caregiver concerned regarding cognition?: No    PREVENTIVE SCREENINGS      Cardiovascular Screening:    General: Screening Current      Diabetes Screening:     General: Screening Current      Colorectal Cancer Screening:     General: Screening Current      Breast Cancer Screening:     General: Screening Current      Cervical Cancer Screening:    General: Screening Not Indicated      Lung Cancer Screening:     General: Screening Not Indicated    Screening, Brief Intervention, and Referral to Treatment (SBIRT)    Screening  Typical number of drinks in a day: 0  Typical number of drinks in a week: 0  Interpretation: Low risk drinking behavior  Single Item Drug Screening:  How often have you used an illegal drug (including marijuana) or a prescription medication for non-medical reasons in the past year? never    Single Item Drug Screen Score: 0  Interpretation: Negative screen for possible drug use disorder    No results found  Physical Exam:     /82   Pulse 66   Resp 16   Ht 5' 5 5" (1 664 m)   Wt 84 kg (185 lb 3 2 oz)   SpO2 96%   BMI 30 35 kg/m²     Physical Exam  Constitutional:       General: She is not in acute distress  Appearance: Normal appearance  She is well-developed and well-nourished  She is obese  She is not ill-appearing, toxic-appearing or diaphoretic  HENT:      Head: Normocephalic and atraumatic  Right Ear: External ear normal       Left Ear: External ear normal       Nose: Nose normal       Mouth/Throat:      Mouth: Oropharynx is clear and moist    Eyes:      Pupils: Pupils are equal, round, and reactive to light     Cardiovascular: Rate and Rhythm: Normal rate and regular rhythm  Pulses: Intact distal pulses  Heart sounds: Normal heart sounds  No murmur heard  Pulmonary:      Effort: Pulmonary effort is normal       Breath sounds: Normal breath sounds  Abdominal:      General: There is no distension  Palpations: Abdomen is soft  Tenderness: There is no abdominal tenderness  There is no guarding  Musculoskeletal:         General: No edema  Neurological:      Mental Status: She is alert     Psychiatric:         Mood and Affect: Mood and affect normal           Eleni Hall DO

## 2022-11-18 NOTE — PATIENT INSTRUCTIONS
Medicare Preventive Visit Patient Instructions  Thank you for completing your Welcome to Medicare Visit or Medicare Annual Wellness Visit today  Your next wellness visit will be due in one year (11/19/2023)  The screening/preventive services that you may require over the next 5-10 years are detailed below  Some tests may not apply to you based off risk factors and/or age  Screening tests ordered at today's visit but not completed yet may show as past due  Also, please note that scanned in results may not display below  Preventive Screenings:  Service Recommendations Previous Testing/Comments   Colorectal Cancer Screening  * Colonoscopy    * Fecal Occult Blood Test (FOBT)/Fecal Immunochemical Test (FIT)  * Fecal DNA/Cologuard Test  * Flexible Sigmoidoscopy Age: 39-70 years old   Colonoscopy: every 10 years (may be performed more frequently if at higher risk)  OR  FOBT/FIT: every 1 year  OR  Cologuard: every 3 years  OR  Sigmoidoscopy: every 5 years  Screening may be recommended earlier than age 39 if at higher risk for colorectal cancer  Also, an individualized decision between you and your healthcare provider will decide whether screening between the ages of 74-80 would be appropriate  Colonoscopy: 05/10/2018  FOBT/FIT: Not on file  Cologuard: Not on file  Sigmoidoscopy: Not on file          Breast Cancer Screening Age: 36 years old  Frequency: every 1-2 years  Not required if history of left and right mastectomy Mammogram: 01/11/2022        Cervical Cancer Screening Between the ages of 21-29, pap smear recommended once every 3 years  Between the ages of 33-67, can perform pap smear with HPV co-testing every 5 years     Recommendations may differ for women with a history of total hysterectomy, cervical cancer, or abnormal pap smears in past  Pap Smear: Not on file        Hepatitis C Screening Once for adults born between [facility]  More frequently in patients at high risk for Hepatitis C Hep C Antibody: Not on file        Diabetes Screening 1-2 times per year if you're at risk for diabetes or have pre-diabetes Fasting glucose: 101 mg/dL (11/14/2022)  A1C: 5 5 % (11/14/2022)      Cholesterol Screening Once every 5 years if you don't have a lipid disorder  May order more often based on risk factors  Lipid panel: 11/12/2021          Other Preventive Screenings Covered by Medicare:  1  Abdominal Aortic Aneurysm (AAA) Screening: covered once if your at risk  You're considered to be at risk if you have a family history of AAA  2  Lung Cancer Screening: covers low dose CT scan once per year if you meet all of the following conditions: (1) Age 50-69; (2) No signs or symptoms of lung cancer; (3) Current smoker or have quit smoking within the last 15 years; (4) You have a tobacco smoking history of at least 20 pack years (packs per day multiplied by number of years you smoked); (5) You get a written order from a healthcare provider  3  Glaucoma Screening: covered annually if you're considered high risk: (1) You have diabetes OR (2) Family history of glaucoma OR (3)  aged 48 and older OR (3)  American aged 72 and older  3  Osteoporosis Screening: covered every 2 years if you meet one of the following conditions: (1) You're estrogen deficient and at risk for osteoporosis based off medical history and other findings; (2) Have a vertebral abnormality; (3) On glucocorticoid therapy for more than 3 months; (4) Have primary hyperparathyroidism; (5) On osteoporosis medications and need to assess response to drug therapy  · Last bone density test (DXA Scan): 11/06/2020   5  HIV Screening: covered annually if you're between the age of 15-65  Also covered annually if you are younger than 13 and older than 72 with risk factors for HIV infection  For pregnant patients, it is covered up to 3 times per pregnancy      Immunizations:  Immunization Recommendations   Influenza Vaccine Annual influenza vaccination during flu season is recommended for all persons aged >= 6 months who do not have contraindications   Pneumococcal Vaccine   * Pneumococcal conjugate vaccine = PCV13 (Prevnar 13), PCV15 (Vaxneuvance), PCV20 (Prevnar 20)  * Pneumococcal polysaccharide vaccine = PPSV23 (Pneumovax) Adults 25-60 years old: 1-3 doses may be recommended based on certain risk factors  Adults 72 years old: 1-2 doses may be recommended based off what pneumonia vaccine you previously received   Hepatitis B Vaccine 3 dose series if at intermediate or high risk (ex: diabetes, end stage renal disease, liver disease)   Tetanus (Td) Vaccine - COST NOT COVERED BY MEDICARE PART B Following completion of primary series, a booster dose should be given every 10 years to maintain immunity against tetanus  Td may also be given as tetanus wound prophylaxis  Tdap Vaccine - COST NOT COVERED BY MEDICARE PART B Recommended at least once for all adults  For pregnant patients, recommended with each pregnancy  Shingles Vaccine (Shingrix) - COST NOT COVERED BY MEDICARE PART B  2 shot series recommended in those aged 48 and above     Health Maintenance Due:      Topic Date Due   • Colorectal Cancer Screening  05/10/2023     Immunizations Due:      Topic Date Due   • Hepatitis B Vaccine (1 of 3 - 3-dose series) Never done   • Pneumococcal Vaccine: 65+ Years (3) 08/18/2016   • COVID-19 Vaccine (3 - Booster for Pfizer series) 07/08/2021   • Influenza Vaccine (1) 09/01/2022     Advance Directives   What are advance directives? Advance directives are legal documents that state your wishes and plans for medical care  These plans are made ahead of time in case you lose your ability to make decisions for yourself  Advance directives can apply to any medical decision, such as the treatments you want, and if you want to donate organs  What are the types of advance directives?   There are many types of advance directives, and each state has rules about how to use them  You may choose a combination of any of the following:  · Living will: This is a written record of the treatment you want  You can also choose which treatments you do not want, which to limit, and which to stop at a certain time  This includes surgery, medicine, IV fluid, and tube feedings  · Durable power of  for healthcare [facility]): This is a written record that states who you want to make healthcare choices for you when you are unable to make them for yourself  This person, called a proxy, is usually a family member or a friend  You may choose more than 1 proxy  · Do not resuscitate (DNR) order:  A DNR order is used in case your heart stops beating or you stop breathing  It is a request not to have certain forms of treatment, such as CPR  A DNR order may be included in other types of advance directives  · Medical directive: This covers the care that you want if you are in a coma, near death, or unable to make decisions for yourself  You can list the treatments you want for each condition  Treatment may include pain medicine, surgery, blood transfusions, dialysis, IV or tube feedings, and a ventilator (breathing machine)  · Values history: This document has questions about your views, beliefs, and how you feel and think about life  This information can help others choose the care that you would choose  Why are advance directives important? An advance directive helps you control your care  Although spoken wishes may be used, it is better to have your wishes written down  Spoken wishes can be misunderstood, or not followed  Treatments may be given even if you do not want them  An advance directive may make it easier for your family to make difficult choices about your care  Urinary Incontinence   Urinary incontinence (UI)  is when you lose control of your bladder  UI develops because your bladder cannot store or empty urine properly   The 3 most common types of UI are stress incontinence, urge incontinence, or both  Medicines:   · May be given to help strengthen your bladder control  Report any side effects of medication to your healthcare provider  Do pelvic muscle exercises often:  Your pelvic muscles help you stop urinating  Squeeze these muscles tight for 5 seconds, then relax for 5 seconds  Gradually work up to squeezing for 10 seconds  Do 3 sets of 15 repetitions a day, or as directed  This will help strengthen your pelvic muscles and improve bladder control  Train your bladder:  Go to the bathroom at set times, such as every 2 hours, even if you do not feel the urge to go  You can also try to hold your urine when you feel the urge to go  For example, hold your urine for 5 minutes when you feel the urge to go  As that becomes easier, hold your urine for 10 minutes  Self-care:   · Keep a UI record  Write down how often you leak urine and how much you leak  Make a note of what you were doing when you leaked urine  · Drink liquids as directed  You may need to limit the amount of liquid you drink to help control your urine leakage  Do not drink any liquid right before you go to bed  Limit or do not have drinks that contain caffeine or alcohol  · Prevent constipation  Eat a variety of high-fiber foods  Good examples are high-fiber cereals, beans, vegetables, and whole-grain breads  Walking is the best way to trigger your intestines to have a bowel movement  · Exercise regularly and maintain a healthy weight  Weight loss and exercise will decrease pressure on your bladder and help you control your leakage  · Use a catheter as directed  to help empty your bladder  A catheter is a tiny, plastic tube that is put into your bladder to drain your urine  · Go to behavior therapy as directed  Behavior therapy may be used to help you learn to control your urge to urinate      Weight Management   Why it is important to manage your weight:  Being overweight increases your risk of health conditions such as heart disease, high blood pressure, type 2 diabetes, and certain types of cancer  It can also increase your risk for osteoarthritis, sleep apnea, and other respiratory problems  Aim for a slow, steady weight loss  Even a small amount of weight loss can lower your risk of health problems  How to lose weight safely:  A safe and healthy way to lose weight is to eat fewer calories and get regular exercise  You can lose up about 1 pound a week by decreasing the number of calories you eat by 500 calories each day  Healthy meal plan for weight management:  A healthy meal plan includes a variety of foods, contains fewer calories, and helps you stay healthy  A healthy meal plan includes the following:  · Eat whole-grain foods more often  A healthy meal plan should contain fiber  Fiber is the part of grains, fruits, and vegetables that is not broken down by your body  Whole-grain foods are healthy and provide extra fiber in your diet  Some examples of whole-grain foods are whole-wheat breads and pastas, oatmeal, brown rice, and bulgur  · Eat a variety of vegetables every day  Include dark, leafy greens such as spinach, kale, mary jane greens, and mustard greens  Eat yellow and orange vegetables such as carrots, sweet potatoes, and winter squash  · Eat a variety of fruits every day  Choose fresh or canned fruit (canned in its own juice or light syrup) instead of juice  Fruit juice has very little or no fiber  · Eat low-fat dairy foods  Drink fat-free (skim) milk or 1% milk  Eat fat-free yogurt and low-fat cottage cheese  Try low-fat cheeses such as mozzarella and other reduced-fat cheeses  · Choose meat and other protein foods that are low in fat  Choose beans or other legumes such as split peas or lentils  Choose fish, skinless poultry (chicken or turkey), or lean cuts of red meat (beef or pork)  Before you cook meat or poultry, cut off any visible fat  · Use less fat and oil    Try baking foods instead of frying them  Add less fat, such as margarine, sour cream, regular salad dressing and mayonnaise to foods  Eat fewer high-fat foods  Some examples of high-fat foods include french fries, doughnuts, ice cream, and cakes  · Eat fewer sweets  Limit foods and drinks that are high in sugar  This includes candy, cookies, regular soda, and sweetened drinks  Exercise:  Exercise at least 30 minutes per day on most days of the week  Some examples of exercise include walking, biking, dancing, and swimming  You can also fit in more physical activity by taking the stairs instead of the elevator or parking farther away from stores  Ask your healthcare provider about the best exercise plan for you  © Copyright [employer] 2018 Information is for End User's use only and may not be sold, redistributed or otherwise used for commercial purposes   All illustrations and images included in CareNotes® are the copyrighted property of A D A M , Inc  or [address] [place] [address]

## 2022-11-20 PROBLEM — E66.09 CLASS 1 OBESITY DUE TO EXCESS CALORIES WITH BODY MASS INDEX (BMI) OF 30.0 TO 30.9 IN ADULT: Status: ACTIVE | Noted: 2022-11-20

## 2022-11-20 PROBLEM — E66.811 CLASS 1 OBESITY DUE TO EXCESS CALORIES WITH BODY MASS INDEX (BMI) OF 30.0 TO 30.9 IN ADULT: Status: ACTIVE | Noted: 2022-11-20

## 2022-11-20 NOTE — ASSESSMENT & PLAN NOTE
Assessment and plan 1  Medicare subsequent annual wellness examination overall the patient is clinically stable and doing well, we encouraged the patient to follow a healthy and balanced diet  We recommend that the patient exercise routinely approximately 30 minutes 5 times per week   We have reviewed the patient's vaccines and have made recommendations for updates if necessary  influenza vaccine      We will be ordering screening laboratories which are age appropriate  Return to the office in    6 months  call if any problems

## 2022-11-20 NOTE — ASSESSMENT & PLAN NOTE
Hypertension - controlled, I have counseled patient following healthy balance diet, I would like the patient reduce sodium, exercise routinely, I would like the patient continued the med current medical regiment and we will continue to monitor    Blood pressure today 122/82; continue Lopressor, mazide

## 2022-12-02 ENCOUNTER — OFFICE VISIT (OUTPATIENT)
Dept: CARDIOLOGY CLINIC | Facility: CLINIC | Age: 82
End: 2022-12-02

## 2022-12-02 VITALS
SYSTOLIC BLOOD PRESSURE: 120 MMHG | DIASTOLIC BLOOD PRESSURE: 80 MMHG | HEART RATE: 70 BPM | BODY MASS INDEX: 30.43 KG/M2 | WEIGHT: 185.7 LBS

## 2022-12-02 DIAGNOSIS — I48.0 PAROXYSMAL ATRIAL FIBRILLATION (HCC): Primary | ICD-10-CM

## 2022-12-02 DIAGNOSIS — I10 BENIGN ESSENTIAL HYPERTENSION: ICD-10-CM

## 2022-12-02 NOTE — PROGRESS NOTES
Cardiology Follow Up    Padilla Hastings  1940  [id number]  [address]  [phone]  [phone]    1  Paroxysmal atrial fibrillation (HCC)  POCT ECG    AMB extended holter monitor      2  Benign essential hypertension            Interval History:  Cardiology follow-up  Patient doing well  Denies any chest pain or dyspnea, she does have occasional palpitations no very frequently usually at night short-lived  They may happen about once a week or so  No associated dyspnea diaphoresis, she denies any focal neurological deficits or amaurosis fugax  No bleeding issues on chronic aspirin therapy  Compliant low-sodium diet, blood pressures been well controlled  Lipids last year total cholesterol 170, HDL 67 LDL of 89  Patient Active Problem List   Diagnosis   • Atrial fibrillation ([facility] [address] )   • Benign essential hypertension   • Hypothyroidism   • Medicare annual wellness visit, subsequent   • Preoperative clearance   • Age-related cataract of both eyes   • Preop examination   • Age-related cataract   • Essential hypertension   • Screening for diabetes mellitus   • Screening for osteoporosis   • Meniere's disease   • Oral candida   • Oral ulcer   • Needs flu shot   • Snoring   • Hematoma and contusion   • Hematoma of left lower leg   • Vitamin D deficiency   • IFG (impaired fasting glucose)   • PTSD (post-traumatic stress disorder)   • Hip pain   • Intentional tremor   • Class 1 obesity due to excess calories with body mass index (BMI) of 30 0 to 30 9 in adult     Past Medical History:   Diagnosis Date   • Arthritis     "Everywhere"OA   • Atrial fibrillation ([facility] [address] )    • HL (hearing loss)    • Hypokalemia     last assessed: 4/25/2015   • Meniere's disease     Dx 40 yrs ago per pt     • Squamous cell skin cancer     (SCCIS)     Social History     Socioeconomic History   • Marital status: /Civil Union Spouse name: Not on file   • Number of children: Not on file   • Years of education: Not on file   • Highest education level: Not on file   Occupational History   • Not on file   Tobacco Use   • Smoking status: Former     Types: Cigarettes     Quit date: 80     Years since quittin 9   • Smokeless tobacco: Never   • Tobacco comments:     former smoker   Vaping Use   • Vaping Use: Never used   Substance and Sexual Activity   • Alcohol use: No     Comment: never drank alcohol   • Drug use: No   • Sexual activity: Not Currently   Other Topics Concern   • Not on file   Social History Narrative   • Not on file     Social Determinants of Health     Financial Resource Strain: Low Risk    • Difficulty of Paying Living Expenses: Not hard at all   Food Insecurity: Not on file   Transportation Needs: No Transportation Needs   • Lack of Transportation (Medical): No   • Lack of Transportation (Non-Medical):  No   Physical Activity: Not on file   Stress: Not on file   Social Connections: Not on file   Intimate Partner Violence: Not on file   Housing Stability: Not on file      Family History   Problem Relation Age of Onset   • Dementia Mother    • Colon cancer Father 80   • No Known Problems Sister    • No Known Problems Daughter    • No Known Problems Daughter    • No Known Problems Maternal Aunt    • No Known Problems Paternal Aunt    • No Known Problems Paternal Aunt    • No Known Problems Paternal Aunt    • Brain cancer Half-Sister      Past Surgical History:   Procedure Laterality Date   • HYSTERECTOMY      partial  age 30       Current Outpatient Medications:   •  Acetaminophen 650 MG/20 3ML SOLN, Take 650 mg by mouth every 4 (four) hours as needed , Disp: , Rfl:   •  Ascorbic Acid (VITAMIN C) 500 MG CAPS, Take by mouth, Disp: , Rfl:   •  aspirin 81 MG tablet, Take 1 tablet by mouth daily, Disp: , Rfl:   •  Calcium Carb-Cholecalciferol (CALTRATE 600+D) 600-800 MG-UNIT TABS, Take by mouth, Disp: , Rfl:   • Cholecalciferol (VITAMIN D-3) 1000 units CAPS, Take by mouth, Disp: , Rfl:   •  fluorouracil (EFUDEX) 5 % cream, Apply twice a day for  5 days to red spots on  the nose and forehead  May wait until after the holidays, Disp: 40 g, Rfl: 0  •  Glucosamine 750 MG TABS, Take by mouth, Disp: , Rfl:   •  levothyroxine 88 mcg tablet, Take 1 tablet by mouth once daily, Disp: 90 tablet, Rfl: 0  •  metoprolol tartrate (LOPRESSOR) 25 mg tablet, TAKE ONE TABLET BY MOUTH EVERY DAY, Disp: 90 tablet, Rfl: 1  •  Multiple Vitamins-Minerals (CENTRUM SILVER ULTRA WOMENS) TABS, Take by mouth, Disp: , Rfl:   •  Xiidra 5 % op solution, Administer 1 drop to both eyes 2 (two) times a day, Disp: , Rfl:   •  Loratadine 10 MG CAPS, Take by mouth daily (Patient not taking: Reported on 6/2/2022), Disp: , Rfl:   •  loratadine-pseudoephedrine (CLARITIN-D 24-HOUR)  mg per 24 hr tablet, Take 1 tablet by mouth daily   (Patient not taking: Reported on 12/2/2022), Disp: , Rfl:   •  meclizine (ANTIVERT) 12 5 MG tablet, Take 1 tablet (12 5 mg total) by mouth daily as needed for dizziness (Patient not taking: Reported on 12/2/2022), Disp: 30 tablet, Rfl: 0  •  triamterene-hydrochlorothiazide (MAXZIDE-25) 37 5-25 mg per tablet, Take 1 tablet by mouth daily (Patient not taking: Reported on 6/2/2022), Disp: 90 tablet, Rfl: 1  Allergies   Allergen Reactions   • Dust Mite Extract Allergic Rhinitis   • Fluticasone-Salmeterol Hives     Category: Allergy;    • Molds & Smuts Allergic Rhinitis   • Morphine Nausea Only   • Penicillins Hives     Category:  Allergy;    • Pollen Extract Allergic Rhinitis, Cough, Dermatitis and Itching   • Latex Rash       Labs:  Appointment on 11/14/2022   Component Date Value   • Vit D, 25-Hydroxy 11/14/2022 20 0 (L)    • Hemoglobin A1C 11/14/2022 5 5    • EAG 11/14/2022 111    • Sodium 11/14/2022 139    • Potassium 11/14/2022 4 4    • Chloride 11/14/2022 109 (H)    • CO2 11/14/2022 29    • ANION GAP 11/14/2022 1 (L)    • BUN 11/14/2022 13    • Creatinine 11/14/2022 0 71    • Glucose, Fasting 11/14/2022 101 (H)    • Calcium 11/14/2022 9 6    • AST 11/14/2022 21    • ALT 11/14/2022 26    • Alkaline Phosphatase 11/14/2022 90    • Total Protein 11/14/2022 6 9    • Albumin 11/14/2022 3 5    • Total Bilirubin 11/14/2022 0 59    • eGFR 11/14/2022 78      Imaging: No results found  Review of Systems:  Review of Systems   Constitutional: Negative for activity change and fatigue  Respiratory: Negative for apnea, shortness of breath, wheezing and stridor  Cardiovascular: Positive for palpitations  Negative for chest pain and leg swelling  Neurological: Negative for syncope, facial asymmetry, speech difficulty, weakness and numbness  Hematological: Does not bruise/bleed easily  Physical Exam:  Physical Exam  Vitals reviewed  Constitutional:       General: She is not in acute distress  Appearance: Normal appearance  She is not ill-appearing, toxic-appearing or diaphoretic  Neck:      Vascular: No carotid bruit  Cardiovascular:      Rate and Rhythm: Normal rate and regular rhythm  Heart sounds: Normal heart sounds  No murmur heard  No friction rub  No gallop  Pulmonary:      Effort: Pulmonary effort is normal  No respiratory distress  Breath sounds: Normal breath sounds  No stridor  No wheezing, rhonchi or rales  Neurological:      Mental Status: She is alert  Psychiatric:         Mood and Affect: Mood normal          Discussion/Summary:   Paroxysmal atrial fibrillation, 1st noted in 2014 in the setting of admission with pneumonia  She has remained clinically and electrocardiographically in sinus rhythm over the years  Currently only on aspirin  And beta-blocker, stress test  6 years ago suggested no ischemia, she did 6 minutes of Evan protocol, echocardiographic portion the suggested no ischemia left systolic function is normal   Will do a 2 week monitor      This note was completed in part utilizing m-modal fluency direct voice recognition software  Grammatical errors, random word insertion, spelling mistakes, and incomplete sentences may be an occasional consequence of the system secondary to software limitations, ambient noise and hardware issues  At the time of dictation, efforts were made to edit, clarify and /or correct errors  Please read the chart carefully and recognize, using context, where substitutions have occurred  If you have any questions or concerns about the context, text or information contained within the body of this dictation, please contact myself, the provider, for further clarification

## 2022-12-29 ENCOUNTER — TELEPHONE (OUTPATIENT)
Dept: CARDIOLOGY CLINIC | Facility: CLINIC | Age: 82
End: 2022-12-29

## 2022-12-29 ENCOUNTER — CLINICAL SUPPORT (OUTPATIENT)
Dept: CARDIOLOGY CLINIC | Facility: CLINIC | Age: 82
End: 2022-12-29

## 2022-12-29 DIAGNOSIS — I48.0 PAROXYSMAL ATRIAL FIBRILLATION (HCC): ICD-10-CM

## 2022-12-29 NOTE — TELEPHONE ENCOUNTER
----- Message from Pa Lord MD sent at 12/29/2022  1:17 PM EST -----  Increase metoprolol to 25 mg p o  twice daily    EKG in 2 weeks  ----- Message -----  From: Jey De La Cruz  Sent: 12/29/2022  10:40 AM EST  To: Pa Lord MD

## 2022-12-30 DIAGNOSIS — I10 ESSENTIAL HYPERTENSION: ICD-10-CM

## 2023-01-13 ENCOUNTER — HOSPITAL ENCOUNTER (OUTPATIENT)
Dept: RADIOLOGY | Age: 83
Discharge: HOME | End: 2023-01-13

## 2023-01-13 VITALS — WEIGHT: 185 LBS | BODY MASS INDEX: 29.73 KG/M2 | HEIGHT: 66 IN

## 2023-01-13 DIAGNOSIS — Z12.31 ENCOUNTER FOR SCREENING MAMMOGRAM FOR MALIGNANT NEOPLASM OF BREAST: ICD-10-CM

## 2023-01-16 ENCOUNTER — CLINICAL SUPPORT (OUTPATIENT)
Dept: CARDIOLOGY CLINIC | Facility: CLINIC | Age: 83
End: 2023-01-16

## 2023-01-16 VITALS
HEIGHT: 66 IN | DIASTOLIC BLOOD PRESSURE: 86 MMHG | WEIGHT: 190 LBS | SYSTOLIC BLOOD PRESSURE: 156 MMHG | BODY MASS INDEX: 30.53 KG/M2 | HEART RATE: 61 BPM

## 2023-01-16 DIAGNOSIS — I10 ESSENTIAL HYPERTENSION: ICD-10-CM

## 2023-01-16 DIAGNOSIS — I48.0 PAROXYSMAL ATRIAL FIBRILLATION (HCC): Primary | ICD-10-CM

## 2023-01-16 NOTE — PROGRESS NOTES
Pt is here today under the direction of Dr Joette Homans for an EKG  Pt states she gets the fluttering in her chest at night time and when she gets anxious  Reviewed list of medications with pt      ekg reviewed by Dr Albina Jose  No changes were made  Will forward note to Dr Joette Homans for his review

## 2023-01-18 DIAGNOSIS — E03.9 HYPOTHYROIDISM, UNSPECIFIED TYPE: ICD-10-CM

## 2023-01-18 RX ORDER — LEVOTHYROXINE SODIUM 88 UG/1
TABLET ORAL
Qty: 90 TABLET | Refills: 0 | Status: SHIPPED | OUTPATIENT
Start: 2023-01-18

## 2023-01-20 ENCOUNTER — VBI (OUTPATIENT)
Dept: ADMINISTRATIVE | Facility: OTHER | Age: 83
End: 2023-01-20

## 2023-06-05 ENCOUNTER — NURSE TRIAGE (OUTPATIENT)
Dept: OTHER | Facility: OTHER | Age: 83
End: 2023-06-05

## 2023-06-05 DIAGNOSIS — I10 ESSENTIAL HYPERTENSION: ICD-10-CM

## 2023-06-05 RX ORDER — TRIAMTERENE AND HYDROCHLOROTHIAZIDE 37.5; 25 MG/1; MG/1
1 TABLET ORAL DAILY
Qty: 90 TABLET | Refills: 0 | Status: SHIPPED | OUTPATIENT
Start: 2023-06-05

## 2023-06-05 NOTE — TELEPHONE ENCOUNTER
"  Reason for Disposition  • [1] Caller requesting a prescription renewal (no refills left), no triage required, AND [2] triager able to renew prescription per department policy    Answer Assessment - Initial Assessment Questions  1  DRUG NAME: \"What medicine do you need to have refilled? \"       triamterene-hydrochlorothiazide   2  REFILLS REMAINING: \"How many refills are remaining? \" (Note: The label on the medicine or pill bottle will show how many refills are remaining  If there are no refills remaining, then a renewal may be needed )      0    Tried calling patient  No answer  Sent refill   Pt has appointment for 11/2023    Protocols used: MEDICATION REFILL AND RENEWAL CALL-ADULT-    "

## 2023-06-05 NOTE — TELEPHONE ENCOUNTER
Regarding: Med refill  ----- Message from Romero Manley sent at 6/5/2023  2:02 PM EDT -----  Medication Refill Request     Name  triamterene-hydrochlorothiazide (MAXZIDE-25)   Dose/Frequency 37 5-25mg daily  Quantity 90  Verified pharmacy   YES  Verified ordering Provider   YES  Does patient have enough for the next 3 days?  [address] [place] [address], [address] [place]  [address] [id number]   Phone:  [phone]  Fax:  [phone]

## 2023-10-18 ENCOUNTER — OFFICE VISIT (OUTPATIENT)
Dept: INTERNAL MEDICINE CLINIC | Facility: CLINIC | Age: 83
End: 2023-10-18
Payer: COMMERCIAL

## 2023-10-18 ENCOUNTER — HOSPITAL ENCOUNTER (OUTPATIENT)
Dept: RADIOLOGY | Facility: HOSPITAL | Age: 83
Discharge: HOME | End: 2023-10-18
Payer: COMMERCIAL

## 2023-10-18 VITALS
OXYGEN SATURATION: 95 % | HEIGHT: 66 IN | HEART RATE: 68 BPM | BODY MASS INDEX: 29.73 KG/M2 | DIASTOLIC BLOOD PRESSURE: 70 MMHG | SYSTOLIC BLOOD PRESSURE: 120 MMHG | TEMPERATURE: 98.2 F | WEIGHT: 185 LBS

## 2023-10-18 DIAGNOSIS — M25.511 ACUTE PAIN OF RIGHT SHOULDER: ICD-10-CM

## 2023-10-18 DIAGNOSIS — E03.9 HYPOTHYROIDISM, UNSPECIFIED TYPE: ICD-10-CM

## 2023-10-18 DIAGNOSIS — I48.0 PAROXYSMAL ATRIAL FIBRILLATION (HCC): ICD-10-CM

## 2023-10-18 DIAGNOSIS — M25.511 ACUTE PAIN OF RIGHT SHOULDER: Primary | ICD-10-CM

## 2023-10-18 PROCEDURE — 73030 X-RAY EXAM OF SHOULDER: CPT

## 2023-10-18 PROCEDURE — 99214 OFFICE O/P EST MOD 30 MIN: CPT | Performed by: INTERNAL MEDICINE

## 2023-10-18 RX ORDER — LEVOTHYROXINE SODIUM 88 UG/1
88 TABLET ORAL DAILY
Qty: 30 TABLET | Refills: 0 | Status: SHIPPED | OUTPATIENT
Start: 2023-10-18

## 2023-10-18 NOTE — ASSESSMENT & PLAN NOTE
-X-ray ordered to rule out fracture given reported trauma prior to inability to abduct right shoulder. If x-ray imaging is normal can pursue an MRI to assess for possible rotator cuff tear. A referral has been made to orthopedic surgery. Patient may apply ice to the affected area as needed. Continue Tylenol as needed.

## 2023-10-18 NOTE — PROGRESS NOTES
Name: Nayana Burton      : 1940      MRN: [id number]  Encounter Provider: Oralia Doshi MD  Encounter Date: 10/18/2023   Encounter department: [facility]     1. Acute pain of right shoulder  Assessment & Plan:  -X-ray ordered to rule out fracture given reported trauma prior to inability to abduct right shoulder. If x-ray imaging is normal can pursue an MRI to assess for possible rotator cuff tear. A referral has been made to orthopedic surgery. Patient may apply ice to the affected area as needed. Continue Tylenol as needed. Orders:  -     Ambulatory Referral to Orthopedic Surgery; Future  -     XR shoulder 2+ vw right; Future; Expected date: 10/18/2023    2. Paroxysmal atrial fibrillation (HCC)  Assessment & Plan:  -Rate controlled   -Followed by Cardiology              Subjective      HPI  Patient presents today as an acute visit. She complains of pain in her right shoulder as well as the inability to lift it. She reports she was at the beach on Saturday and when she went to sit on her beach chair it tipped over she fell to the ground landing on her right side. She was unable to get up and had to have a family member pick her up. She notes initially she had significant swelling along her right side. For pain control she is taking Tylenol as needed. All other systems negative except for pertinent findings noted in HPI.        Current Outpatient Medications on File Prior to Visit   Medication Sig   • Acetaminophen 650 MG/20.3ML SOLN Take 650 mg by mouth every 4 (four) hours as needed    • Ascorbic Acid (VITAMIN C) 500 MG CAPS Take by mouth   • aspirin 81 MG tablet Take 1 tablet by mouth daily   • Calcium Carb-Cholecalciferol (CALTRATE 600+D) 600-800 MG-UNIT TABS Take by mouth   • Cholecalciferol (VITAMIN D-3) 1000 units CAPS Take by mouth   • Glucosamine 750 MG TABS Take by mouth   • levothyroxine 88 mcg tablet Take 1 tablet by mouth once daily   • Loratadine 10 MG CAPS Take by mouth daily   • metoprolol tartrate (LOPRESSOR) 25 mg tablet Take 1 tablet (25 mg total) by mouth every 12 (twelve) hours   • Multiple Vitamins-Minerals (CENTRUM SILVER ULTRA WOMENS) TABS Take by mouth   • triamterene-hydrochlorothiazide (MAXZIDE-25) 37.5-25 mg per tablet Take 1 tablet by mouth daily   • Xiidra 5 % op solution Administer 1 drop to both eyes 2 (two) times a day   • fluorouracil (EFUDEX) 5 % cream Apply twice a day for  5 days to red spots on  the nose and forehead.  May wait until after the holidays   • loratadine-pseudoephedrine (CLARITIN-D 24-HOUR)  mg per 24 hr tablet Take 1 tablet by mouth daily   (Patient not taking: Reported on 12/2/2022)   • meclizine (ANTIVERT) 12.5 MG tablet Take 1 tablet (12.5 mg total) by mouth daily as needed for dizziness (Patient not taking: Reported on 12/2/2022)   • [DISCONTINUED] levothyroxine 88 mcg tablet Take 1 tablet (88 mcg total) by mouth daily       Objective     /70 (BP Location: Left arm, Patient Position: Sitting, Cuff Size: Large)   Pulse 68   Temp 98.2 °F (36.8 °C) (Probe)   Ht 5' 5.5" (1.664 m)   Wt 83.9 kg (185 lb)   SpO2 95%   BMI 30.32 kg/m²     BP Readings from Last 3 Encounters:   10/18/23 120/70   01/16/23 156/86   12/02/22 120/80        Wt Readings from Last 3 Encounters:   10/18/23 83.9 kg (185 lb)   01/16/23 86.2 kg (190 lb)   01/13/23 83.9 kg (185 lb)       Physical Exam    General: NAD  HEENT: NCAT, EOMI, normal conjunctiva  Cardiovascular: RRR, normal S1 and S2, no m/r/g  Pulmonary: Normal respiratory effort, no wheezes, rales or rhonchi  MSK: Normal bulk and tone, tenderness to palpation over the right AC joint and right proximal biceps tendon, right shoulder abduction limited to 15 to 20 degrees with active and passive range of motion  Extremities: No lower extremity edema  Skin: Normal skin color, no rashes     Leonardo Quintanilla MD

## 2023-10-18 NOTE — PATIENT INSTRUCTIONS
-An x-ray of your right shoulder has been ordered  -A referral has been made to our orthopedics group  -If your x-ray is negative we will pursue an MRI to evaluate the rotator cuff tendons in the right shoulder

## 2023-11-06 ENCOUNTER — OFFICE VISIT (OUTPATIENT)
Dept: OBGYN CLINIC | Facility: OTHER | Age: 83
End: 2023-11-06
Payer: COMMERCIAL

## 2023-11-06 VITALS
SYSTOLIC BLOOD PRESSURE: 141 MMHG | DIASTOLIC BLOOD PRESSURE: 81 MMHG | HEIGHT: 66 IN | HEART RATE: 78 BPM | BODY MASS INDEX: 29.73 KG/M2 | WEIGHT: 185 LBS

## 2023-11-06 DIAGNOSIS — M67.911 ROTATOR CUFF DYSFUNCTION, RIGHT: Primary | ICD-10-CM

## 2023-11-06 DIAGNOSIS — M25.511 ACUTE PAIN OF RIGHT SHOULDER: ICD-10-CM

## 2023-11-06 PROCEDURE — 99214 OFFICE O/P EST MOD 30 MIN: CPT | Performed by: ORTHOPAEDIC SURGERY

## 2023-11-06 NOTE — PROGRESS NOTES
Assessment  Diagnoses and all orders for this visit:    Rotator cuff dysfunction, right    Acute pain of right shoulder      Discussion and Plan:    The patient has an examination consistent with RTC dysfunction. I have discussed with the patient the pathophysiology of this diagnosis and reviewed how the examination correlates with this diagnosis. Treatment options were discussed at length and after discussing these treatment options, the patient elected for a trial of physical therapy. If symptoms persist we can consider CS injection vs MRI. Subjective:   Patient ID: Wendy Barth is a 78 y.o. female      HPI  The patient presents with a chief complaint of right shoulder pain. The pain began 3 week(s) ago and is associated with an acute injury. Patient report a fall on the beach, landing on the right shoulder on 10/14/23. The patient describes the pain as aching, dull, and sharp in intensity,  intermittent in timing, and localizes the pain to the  right lateral shoulder. The pain is worse with movement and relieved by rest.  The pain is not associated with numbness and tingling. The pain is not associated with constitutional symptoms. The patient is awoken at night by the pain. The patient has not has any treatment. The following portions of the patient's history were reviewed and updated as appropriate: allergies, current medications, past family history, past medical history, past social history, past surgical history and problem list.        Objective:  /81 (BP Location: Left arm, Patient Position: Sitting, Cuff Size: Standard)   Pulse 78   Ht 5' 5.5" (1.664 m)   Wt 83.9 kg (185 lb)   BMI 30.32 kg/m²       Right Shoulder Exam     Tenderness   The patient is experiencing no tenderness.     Range of Motion   External rotation:  60   Forward flexion:  70 (PROM 160)   Internal rotation 0 degrees:  Lumbar     Muscle Strength   Abduction: 3/5   External rotation: 3/5     Tests   Drop arm: positive    Other   Erythema: absent  Sensation: normal  Pulse: present            Physical Exam  Vitals reviewed. Constitutional:       Appearance: She is well-developed. Eyes:      Pupils: Pupils are equal, round, and reactive to light. Pulmonary:      Effort: Pulmonary effort is normal.      Breath sounds: Normal breath sounds. Abdominal:      General: Abdomen is flat. There is no distension. Skin:     General: Skin is warm and dry. Neurological:      Mental Status: She is alert and oriented to person, place, and time. Psychiatric:         Behavior: Behavior normal.         Thought Content: Thought content normal.         Judgment: Judgment normal.           I have personally reviewed pertinent films in PACS and my interpretation is as follows. Right shoulder x-rays 10/18/23 demonstrates no fracture or dislocation, mild degenerative changes.        Scribe Attestation      I,:  Antoni Sheets am acting as a scribe while in the presence of the attending physician.:       I,:  Thierno Brown MD personally performed the services described in this documentation    as scribed in my presence.:

## 2023-11-15 ENCOUNTER — RA CDI HCC (OUTPATIENT)
Dept: OTHER | Facility: HOSPITAL | Age: 83
End: 2023-11-15

## 2023-11-15 ENCOUNTER — EVALUATION (OUTPATIENT)
Dept: PHYSICAL THERAPY | Age: 83
End: 2023-11-15
Payer: COMMERCIAL

## 2023-11-15 DIAGNOSIS — M25.511 ACUTE PAIN OF RIGHT SHOULDER: ICD-10-CM

## 2023-11-15 DIAGNOSIS — M67.911 ROTATOR CUFF DYSFUNCTION, RIGHT: Primary | ICD-10-CM

## 2023-11-15 PROCEDURE — 97110 THERAPEUTIC EXERCISES: CPT

## 2023-11-15 PROCEDURE — 97161 PT EVAL LOW COMPLEX 20 MIN: CPT

## 2023-11-15 NOTE — PROGRESS NOTES
PT Evaluation     Today's date: 11/15/2023  Patient name: Vilma Pike  : 1940  MRN: [id number]  Referring provider: Estela Cisneros*  Dx:   Encounter Diagnosis     ICD-10-CM    1. Rotator cuff dysfunction, right  M67.911                      Assessment  Assessment details: Pt is an 78 y.o. female who presents to OP PT with chief c/o R shoulder pain for 4 weeks following a fall onto her R side. Signs and symptoms indicate probable diagnosis of full thickness RTC tear. she has primary impairments of significantly decreased R shoulder ROM, significantly decreased R shoulder strength, and increased pain. She is limited functionally as she is unable to reach overhead, unable to perform grooming ADL's, unable to lift/carry objects, and difficulty participating in usual leisure activities. Provided pt with AAROM exercises in seated for HEP which she was able to tolerate and this improved her R shoulder PROM following completion. Provided pt with pulleys for home as well. Educated pt on diagnosis, activity modifications, prognosis, and POC. she verbalizes understanding of all education provided. All questions answered. Pt would benefit from skilled OP PT in order to improve upon impairments and return to PLOF.    Impairments: abnormal or restricted ROM, impaired physical strength, lacks appropriate home exercise program, pain with function and poor posture   Understanding of Dx/Px/POC: good   Prognosis: good    Goals  STG  Pt will be able to reach overhead with a 50% reduction in symptoms within 2-3 weeks  Pt will be able perform grooming ADL's with a 50% reduction in symptoms within 2-3 weeks    LTG  Pt will be independent with HEP by d/c  Pt will improve FOTO by >/= expected by d/c  Pt will be able to tolerate normal leisure/recreational activities by d/c  Pt will be able to lift 8-10 lb container to overhead cabinet by d/c         Plan  Planned therapy interventions: patient education, therapeutic exercise, graded exercise, functional ROM exercises, flexibility, home exercise program, manual therapy, activity modification, strengthening, stretching, joint mobilization, massage and therapeutic activities  Frequency: 2x week  Duration in weeks: 6  Treatment plan discussed with: patient      Subjective Evaluation    History of Present Illness  Mechanism of injury: , had a fall, She reached back for her chair with her R arm, the chair gave out and she fell on her R side. She experienced pain immediately following the fall. Pt reports that her R shoulder has loosed up a lot. She has a hard time brushing her teeth, doing her hair. She can use her elbow but she can't do anything straight out in front of her. Pt has pain when trying to lift straight forward. She can move her arm behind her and out to the side a little bit. Pt reports a sharp pain and then it aches. She takes tylenol and puts salonpas for pain relief. She slept for two weeks on the couch. She is now sleeping in her bed. She can now lay on the R side. Pain sometimes does wake her up at night but she has been taking a tylenol PM. Pt does notice some tingling in her hand but it is intermittent but especially when she is driving and just her fingers but it can be different ones.      Pt does note that she was having very mild discomfort in her R shoulder prior to this injury           Not a recurrent problem   Quality of life: excellent    Patient Goals  Patient goals for therapy: return to sport/leisure activities, independence with ADLs/IADLs, increased strength, decreased pain and increased motion    Pain  Current pain ratin  At best pain ratin  At worst pain rating: 10  Quality: sharp and dull ache  Aggravating factors: overhead activity  Progression: no change    Social Support  Lives with: spouse    Hand dominance: right      Diagnostic Tests  X-ray: normal  Treatments  Current treatment: physical therapy        Objective Active Range of Motion   Left Shoulder   Normal active range of motion    Right Shoulder   Flexion: 40 degrees with pain  Abduction: 60 degrees with pain  External rotation 0°: 0 degrees   External rotation 45°: 30 degrees with pain    Additional Active Range of Motion Details  IR BTB (cm from occ protuberance to thumb)  R: 42 cm  L: 28 cm       Passive Range of Motion     Right Shoulder   Flexion: 80 degrees with pain  Abduction: 90 degrees with pain  External rotation 45°: 30 degrees with pain    Strength/Myotome Testing     Right Shoulder     Planes of Motion   Flexion: 2-   Abduction: 2   External rotation at 0°: 2   Internal rotation at 0°: 3     Tests     Right Shoulder   Positive drop arm and external rotation lag sign. Negative belly press.             Precautions: R shoulder RTC tear      Manuals 11/15            R shoulder PROM             Stabilization MRE's                                       Neuro Re-Ed                                                                                                        Ther Ex             Pulleys 5'            Table slides X15 flex+abd            Scap retractions             Deltoid isometrics             Bicep/tricep PRE's                                                    Ther Activity                                       Gait Training                                       Modalities

## 2023-11-15 NOTE — PROGRESS NOTES
[address] [facility] coding opportunities       Chart reviewed, no opportunity found: [address]        Patients Insurance     Medicare Insurance: [employer]

## 2023-11-17 ENCOUNTER — APPOINTMENT (OUTPATIENT)
Dept: LAB | Age: 83
End: 2023-11-17
Payer: COMMERCIAL

## 2023-11-17 DIAGNOSIS — R89.9 ABNORMAL LABORATORY TEST: ICD-10-CM

## 2023-11-17 DIAGNOSIS — Z13.6 SCREENING FOR CARDIOVASCULAR CONDITION: ICD-10-CM

## 2023-11-17 DIAGNOSIS — E03.9 HYPOTHYROIDISM, UNSPECIFIED TYPE: ICD-10-CM

## 2023-11-17 DIAGNOSIS — R73.01 IFG (IMPAIRED FASTING GLUCOSE): ICD-10-CM

## 2023-11-17 LAB
ALBUMIN SERPL BCP-MCNC: 4.2 G/DL (ref 3.5–5)
ALP SERPL-CCNC: 77 U/L (ref 34–104)
ALT SERPL W P-5'-P-CCNC: 18 U/L (ref 7–52)
ANION GAP SERPL CALCULATED.3IONS-SCNC: 9 MMOL/L
AST SERPL W P-5'-P-CCNC: 24 U/L (ref 13–39)
BILIRUB SERPL-MCNC: 0.65 MG/DL (ref 0.2–1)
BUN SERPL-MCNC: 11 MG/DL (ref 5–25)
CALCIUM SERPL-MCNC: 9.7 MG/DL (ref 8.4–10.2)
CHLORIDE SERPL-SCNC: 103 MMOL/L (ref 96–108)
CHOLEST SERPL-MCNC: 179 MG/DL
CO2 SERPL-SCNC: 27 MMOL/L (ref 21–32)
CREAT SERPL-MCNC: 0.68 MG/DL (ref 0.6–1.3)
EST. AVERAGE GLUCOSE BLD GHB EST-MCNC: 120 MG/DL
GFR SERPL CREATININE-BSD FRML MDRD: 80 ML/MIN/1.73SQ M
GLUCOSE P FAST SERPL-MCNC: 100 MG/DL (ref 65–99)
HBA1C MFR BLD: 5.8 %
HDLC SERPL-MCNC: 61 MG/DL
LDLC SERPL CALC-MCNC: 97 MG/DL (ref 0–100)
POTASSIUM SERPL-SCNC: 4 MMOL/L (ref 3.5–5.3)
PROT SERPL-MCNC: 7 G/DL (ref 6.4–8.4)
SODIUM SERPL-SCNC: 139 MMOL/L (ref 135–147)
TRIGL SERPL-MCNC: 106 MG/DL
TSH SERPL DL<=0.05 MIU/L-ACNC: 2.5 UIU/ML (ref 0.45–4.5)

## 2023-11-17 PROCEDURE — 36415 COLL VENOUS BLD VENIPUNCTURE: CPT

## 2023-11-17 PROCEDURE — 83036 HEMOGLOBIN GLYCOSYLATED A1C: CPT

## 2023-11-17 PROCEDURE — 84443 ASSAY THYROID STIM HORMONE: CPT

## 2023-11-17 PROCEDURE — 80061 LIPID PANEL: CPT

## 2023-11-17 PROCEDURE — 84166 PROTEIN E-PHORESIS/URINE/CSF: CPT

## 2023-11-17 PROCEDURE — 80053 COMPREHEN METABOLIC PANEL: CPT

## 2023-11-17 PROCEDURE — 84165 PROTEIN E-PHORESIS SERUM: CPT

## 2023-11-21 ENCOUNTER — OFFICE VISIT (OUTPATIENT)
Dept: INTERNAL MEDICINE CLINIC | Facility: CLINIC | Age: 83
End: 2023-11-21
Payer: COMMERCIAL

## 2023-11-21 VITALS
HEIGHT: 66 IN | WEIGHT: 186.6 LBS | DIASTOLIC BLOOD PRESSURE: 80 MMHG | RESPIRATION RATE: 16 BRPM | HEART RATE: 66 BPM | BODY MASS INDEX: 29.99 KG/M2 | SYSTOLIC BLOOD PRESSURE: 128 MMHG | OXYGEN SATURATION: 97 %

## 2023-11-21 DIAGNOSIS — Z23 ENCOUNTER FOR IMMUNIZATION: Primary | ICD-10-CM

## 2023-11-21 DIAGNOSIS — E03.9 HYPOTHYROIDISM, UNSPECIFIED TYPE: ICD-10-CM

## 2023-11-21 DIAGNOSIS — E66.09 CLASS 1 OBESITY DUE TO EXCESS CALORIES WITHOUT SERIOUS COMORBIDITY WITH BODY MASS INDEX (BMI) OF 30.0 TO 30.9 IN ADULT: ICD-10-CM

## 2023-11-21 DIAGNOSIS — M25.511 ACUTE PAIN OF RIGHT SHOULDER: ICD-10-CM

## 2023-11-21 DIAGNOSIS — Z00.00 MEDICARE ANNUAL WELLNESS VISIT, SUBSEQUENT: ICD-10-CM

## 2023-11-21 DIAGNOSIS — R73.03 PREDIABETES: ICD-10-CM

## 2023-11-21 DIAGNOSIS — I10 BENIGN ESSENTIAL HYPERTENSION: ICD-10-CM

## 2023-11-21 LAB
ALBUMIN SERPL ELPH-MCNC: 4.1 G/DL (ref 3.2–5.1)
ALBUMIN SERPL ELPH-MCNC: 60.3 % (ref 48–70)
ALBUMIN UR ELPH-MCNC: 100 %
ALPHA1 GLOB MFR UR ELPH: 0 %
ALPHA1 GLOB SERPL ELPH-MCNC: 0.29 G/DL (ref 0.15–0.47)
ALPHA1 GLOB SERPL ELPH-MCNC: 4.2 % (ref 1.8–7)
ALPHA2 GLOB MFR UR ELPH: 0 %
ALPHA2 GLOB SERPL ELPH-MCNC: 0.61 G/DL (ref 0.42–1.04)
ALPHA2 GLOB SERPL ELPH-MCNC: 9 % (ref 5.9–14.9)
B-GLOBULIN MFR UR ELPH: 0 %
BETA GLOB ABNORMAL SERPL ELPH-MCNC: 0.36 G/DL (ref 0.31–0.57)
BETA1 GLOB SERPL ELPH-MCNC: 5.3 % (ref 4.7–7.7)
BETA2 GLOB SERPL ELPH-MCNC: 4.9 % (ref 3.1–7.9)
BETA2+GAMMA GLOB SERPL ELPH-MCNC: 0.33 G/DL (ref 0.2–0.58)
GAMMA GLOB ABNORMAL SERPL ELPH-MCNC: 1.11 G/DL (ref 0.4–1.66)
GAMMA GLOB MFR UR ELPH: 0 %
GAMMA GLOB SERPL ELPH-MCNC: 16.3 % (ref 6.9–22.3)
IGG/ALB SER: 1.52 {RATIO} (ref 1.1–1.8)
PROT PATTERN SERPL ELPH-IMP: NORMAL
PROT PATTERN UR ELPH-IMP: NORMAL
PROT SERPL-MCNC: 6.8 G/DL (ref 6.4–8.2)
PROT UR-MCNC: 8.1 MG/DL

## 2023-11-21 PROCEDURE — 84166 PROTEIN E-PHORESIS/URINE/CSF: CPT | Performed by: STUDENT IN AN ORGANIZED HEALTH CARE EDUCATION/TRAINING PROGRAM

## 2023-11-21 PROCEDURE — 90662 IIV NO PRSV INCREASED AG IM: CPT

## 2023-11-21 PROCEDURE — 99214 OFFICE O/P EST MOD 30 MIN: CPT | Performed by: INTERNAL MEDICINE

## 2023-11-21 PROCEDURE — G0008 ADMIN INFLUENZA VIRUS VAC: HCPCS

## 2023-11-21 PROCEDURE — 84165 PROTEIN E-PHORESIS SERUM: CPT | Performed by: STUDENT IN AN ORGANIZED HEALTH CARE EDUCATION/TRAINING PROGRAM

## 2023-11-21 PROCEDURE — G0439 PPPS, SUBSEQ VISIT: HCPCS | Performed by: INTERNAL MEDICINE

## 2023-11-21 RX ORDER — LEVOTHYROXINE SODIUM 88 UG/1
88 TABLET ORAL DAILY
Qty: 30 TABLET | Refills: 0 | Status: SHIPPED | OUTPATIENT
Start: 2023-11-21

## 2023-11-21 NOTE — PATIENT INSTRUCTIONS
Medicare Preventive Visit Patient Instructions  Thank you for completing your Welcome to Medicare Visit or Medicare Annual Wellness Visit today. Your next wellness visit will be due in one year (11/21/2024). The screening/preventive services that you may require over the next 5-10 years are detailed below. Some tests may not apply to you based off risk factors and/or age. Screening tests ordered at today's visit but not completed yet may show as past due. Also, please note that scanned in results may not display below. Preventive Screenings:  Service Recommendations Previous Testing/Comments   Colorectal Cancer Screening  * Colonoscopy    * Fecal Occult Blood Test (FOBT)/Fecal Immunochemical Test (FIT)  * Fecal DNA/Cologuard Test  * Flexible Sigmoidoscopy Age: 43-73 years old   Colonoscopy: every 10 years (may be performed more frequently if at higher risk)  OR  FOBT/FIT: every 1 year  OR  Cologuard: every 3 years  OR  Sigmoidoscopy: every 5 years  Screening may be recommended earlier than age 39 if at higher risk for colorectal cancer. Also, an individualized decision between you and your healthcare provider will decide whether screening between the ages of 77-80 would be appropriate. Colonoscopy: 05/10/2018  FOBT/FIT: Not on file  Cologuard: Not on file  Sigmoidoscopy: Not on file          Breast Cancer Screening Age: 36 years old  Frequency: every 1-2 years  Not required if history of left and right mastectomy Mammogram: 01/13/2023        Cervical Cancer Screening Between the ages of 21-29, pap smear recommended once every 3 years. Between the ages of 32-69, can perform pap smear with HPV co-testing every 5 years.    Recommendations may differ for women with a history of total hysterectomy, cervical cancer, or abnormal pap smears in past. Pap Smear: Not on file        Hepatitis C Screening Once for adults born between [address] [place] [address]  More frequently in patients at high risk for Hepatitis C Hep C Antibody: Not on file        Diabetes Screening 1-2 times per year if you're at risk for diabetes or have pre-diabetes Fasting glucose: 100 mg/dL (11/17/2023)  A1C: 5.8 % (11/17/2023)      Cholesterol Screening Once every 5 years if you don't have a lipid disorder. May order more often based on risk factors. Lipid panel: 11/17/2023          Other Preventive Screenings Covered by Medicare:  Abdominal Aortic Aneurysm (AAA) Screening: covered once if your at risk. You're considered to be at risk if you have a family history of AAA. Lung Cancer Screening: covers low dose CT scan once per year if you meet all of the following conditions: (1) Age 48-67; (2) No signs or symptoms of lung cancer; (3) Current smoker or have quit smoking within the last 15 years; (4) You have a tobacco smoking history of at least 20 pack years (packs per day multiplied by number of years you smoked); (5) You get a written order from a healthcare provider. Glaucoma Screening: covered annually if you're considered high risk: (1) You have diabetes OR (2) Family history of glaucoma OR (3)  aged 48 and older OR (3)  American aged 72 and older  Osteoporosis Screening: covered every 2 years if you meet one of the following conditions: (1) You're estrogen deficient and at risk for osteoporosis based off medical history and other findings; (2) Have a vertebral abnormality; (3) On glucocorticoid therapy for more than 3 months; (4) Have primary hyperparathyroidism; (5) On osteoporosis medications and need to assess response to drug therapy. Last bone density test (DXA Scan): 11/06/2020. HIV Screening: covered annually if you're between the age of 14-79. Also covered annually if you are younger than 13 and older than 72 with risk factors for HIV infection. For pregnant patients, it is covered up to 3 times per pregnancy.     Immunizations:  Immunization Recommendations   Influenza Vaccine Annual influenza vaccination during flu season is recommended for all persons aged >= 6 months who do not have contraindications   Pneumococcal Vaccine   * Pneumococcal conjugate vaccine = PCV13 (Prevnar 13), PCV15 (Vaxneuvance), PCV20 (Prevnar 20)  * Pneumococcal polysaccharide vaccine = PPSV23 (Pneumovax) Adults 40-67 yo with certain risk factors or if 69+ yo  If never received any pneumonia vaccine: recommend Prevnar 20 (PCV20)  Give PCV20 if previously received 1 dose of PCV13 or PPSV23   Hepatitis B Vaccine 3 dose series if at intermediate or high risk (ex: diabetes, end stage renal disease, liver disease)   Respiratory syncytial virus (RSV) Vaccine - COVERED BY MEDICARE PART D  * RSVPreF3 (Arexvy) CDC recommends that adults 61years of age and older may receive a single dose of RSV vaccine using shared clinical decision-making (SCDM)   Tetanus (Td) Vaccine - COST NOT COVERED BY MEDICARE PART B Following completion of primary series, a booster dose should be given every 10 years to maintain immunity against tetanus. Td may also be given as tetanus wound prophylaxis. Tdap Vaccine - COST NOT COVERED BY MEDICARE PART B Recommended at least once for all adults. For pregnant patients, recommended with each pregnancy. Shingles Vaccine (Shingrix) - COST NOT COVERED BY MEDICARE PART B  2 shot series recommended in those 19 years and older who have or will have weakened immune systems or those 50 years and older     Health Maintenance Due:      Topic Date Due   • Colorectal Cancer Screening  05/10/2023     Immunizations Due:      Topic Date Due   • Pneumococcal Vaccine: 65+ Years (3 - PPSV23 if available, else PCV20) 08/18/2016   • COVID-19 Vaccine (3 - Pfizer series) 04/05/2021   • Influenza Vaccine (1) 09/01/2023     Advance Directives   What are advance directives? Advance directives are legal documents that state your wishes and plans for medical care. These plans are made ahead of time in case you lose your ability to make decisions for yourself.  Advance directives can apply to any medical decision, such as the treatments you want, and if you want to donate organs. What are the types of advance directives? There are many types of advance directives, and each state has rules about how to use them. You may choose a combination of any of the following:  Living will: This is a written record of the treatment you want. You can also choose which treatments you do not want, which to limit, and which to stop at a certain time. This includes surgery, medicine, IV fluid, and tube feedings. Durable power of  for [facility]): This is a written record that states who you want to make healthcare choices for you when you are unable to make them for yourself. This person, called a proxy, is usually a family member or a friend. You may choose more than 1 proxy. Do not resuscitate (DNR) order:  A DNR order is used in case your heart stops beating or you stop breathing. It is a request not to have certain forms of treatment, such as CPR. A DNR order may be included in other types of advance directives. Medical directive: This covers the care that you want if you are in a coma, near death, or unable to make decisions for yourself. You can list the treatments you want for each condition. Treatment may include pain medicine, surgery, blood transfusions, dialysis, IV or tube feedings, and a ventilator (breathing machine). Values history: This document has questions about your views, beliefs, and how you feel and think about life. This information can help others choose the care that you would choose. Why are advance directives important? An advance directive helps you control your care. Although spoken wishes may be used, it is better to have your wishes written down. Spoken wishes can be misunderstood, or not followed. Treatments may be given even if you do not want them.  An advance directive may make it easier for your family to make difficult choices about your care. Urinary Incontinence   Urinary incontinence (UI)  is when you lose control of your bladder. UI develops because your bladder cannot store or empty urine properly. The 3 most common types of UI are stress incontinence, urge incontinence, or both. Medicines:   May be given to help strengthen your bladder control. Report any side effects of medication to your healthcare provider. Do pelvic muscle exercises often:  Your pelvic muscles help you stop urinating. Squeeze these muscles tight for 5 seconds, then relax for 5 seconds. Gradually work up to squeezing for 10 seconds. Do 3 sets of 15 repetitions a day, or as directed. This will help strengthen your pelvic muscles and improve bladder control. Train your bladder:  Go to the bathroom at set times, such as every 2 hours, even if you do not feel the urge to go. You can also try to hold your urine when you feel the urge to go. For example, hold your urine for 5 minutes when you feel the urge to go. As that becomes easier, hold your urine for 10 minutes. Self-care:   Keep a UI record. Write down how often you leak urine and how much you leak. Make a note of what you were doing when you leaked urine. Drink liquids as directed. You may need to limit the amount of liquid you drink to help control your urine leakage. Do not drink any liquid right before you go to bed. Limit or do not have drinks that contain caffeine or alcohol. Prevent constipation. Eat a variety of high-fiber foods. Good examples are high-fiber cereals, beans, vegetables, and whole-grain breads. Walking is the best way to trigger your intestines to have a bowel movement. Exercise regularly and maintain a healthy weight. Weight loss and exercise will decrease pressure on your bladder and help you control your leakage. Use a catheter as directed  to help empty your bladder. A catheter is a tiny, plastic tube that is put into your bladder to drain your urine.    Go to behavior therapy as directed. Behavior therapy may be used to help you learn to control your urge to urinate. Weight Management   Why it is important to manage your weight:  Being overweight increases your risk of health conditions such as heart disease, high blood pressure, type 2 diabetes, and certain types of cancer. It can also increase your risk for osteoarthritis, sleep apnea, and other respiratory problems. Aim for a slow, steady weight loss. Even a small amount of weight loss can lower your risk of health problems. How to lose weight safely:  A safe and healthy way to lose weight is to eat fewer calories and get regular exercise. You can lose up about 1 pound a week by decreasing the number of calories you eat by 500 calories each day. Healthy meal plan for weight management:  A healthy meal plan includes a variety of foods, contains fewer calories, and helps you stay healthy. A healthy meal plan includes the following:  Eat whole-grain foods more often. A healthy meal plan should contain fiber. Fiber is the part of grains, fruits, and vegetables that is not broken down by your body. Whole-grain foods are healthy and provide extra fiber in your diet. Some examples of whole-grain foods are whole-wheat breads and pastas, oatmeal, brown rice, and bulgur. Eat a variety of vegetables every day. Include dark, leafy greens such as spinach, kale, mary jane greens, and mustard greens. Eat yellow and orange vegetables such as carrots, sweet potatoes, and winter squash. Eat a variety of fruits every day. Choose fresh or canned fruit (canned in its own juice or light syrup) instead of juice. Fruit juice has very little or no fiber. Eat low-fat dairy foods. Drink fat-free (skim) milk or 1% milk. Eat fat-free yogurt and low-fat cottage cheese. Try low-fat cheeses such as mozzarella and other reduced-fat cheeses. Choose meat and other protein foods that are low in fat.   Choose beans or other legumes such as split peas or lentils. Choose fish, skinless poultry (chicken or turkey), or lean cuts of red meat (beef or pork). Before you cook meat or poultry, cut off any visible fat. Use less fat and oil. Try baking foods instead of frying them. Add less fat, such as margarine, sour cream, regular salad dressing and mayonnaise to foods. Eat fewer high-fat foods. Some examples of high-fat foods include french fries, doughnuts, ice cream, and cakes. Eat fewer sweets. Limit foods and drinks that are high in sugar. This includes candy, cookies, regular soda, and sweetened drinks. Exercise:  Exercise at least 30 minutes per day on most days of the week. Some examples of exercise include walking, biking, dancing, and swimming. You can also fit in more physical activity by taking the stairs instead of the elevator or parking farther away from stores. Ask your healthcare provider about the best exercise plan for you. © Copyright [employer] 2018 Information is for End User's use only and may not be sold, redistributed or otherwise used for commercial purposes.  All illustrations and images included in CareNotes® are the copyrighted property of [employer] or [address] [place] [address]

## 2023-11-21 NOTE — PROGRESS NOTES
Assessment and Plan:     Problem List Items Addressed This Visit        Endocrine    Hypothyroidism     Hypothyroidism controlled the patient is currently euthyroid I will be ordering a TSH prior to the next office visit and the patient will continue with current medical regiment; we will continue to monitor the patient's progress. Relevant Medications    levothyroxine 88 mcg tablet    Other Relevant Orders    Comprehensive metabolic panel    TSH, 3rd generation       Cardiovascular and Mediastinum    Benign essential hypertension     Hypertension - controlled, I have counseled patient following healthy balance diet, I would like the patient reduce sodium, exercise routinely, I would like the patient continued the med current medical regiment and we will continue to monitor. Other    Medicare annual wellness visit, subsequent     Assessment and plan 1. Medicare subsequent annual wellness examination overall the patient is clinically stable and doing well, we encouraged the patient to follow a healthy and balanced diet. We recommend that the patient exercise routinely approximately 30 minutes 5 times per week . We have reviewed the patient's vaccines and have made recommendations for updates if necessary   annual flu shot, consider RSV and COVID booster   . We will be ordering screening laboratories which are age appropriate. Return to the office in    6 months call if any problems. Class 1 obesity due to excess calories with body mass index (BMI) of 30.0 to 30.9 in adult     Obesity -I have counseled patient following healthy and balanced diet, I would like the patient to lose weight, I would like the patient exercise routinely; we will continue monitor the patient's progress.          Acute pain of right shoulder     Status post fall x-ray no fracture working with orthopedics Dr. Deann Cummings and physical therapy making slow progress range of motion exercises advised        Other Visit Diagnoses     Encounter for immunization    -  Primary    Relevant Orders    influenza vaccine, high-dose, PF 0.7 mL (FLUZONE HIGH-DOSE) (Completed)    Prediabetes        Relevant Orders    Hemoglobin A1C      RTO in 6 months call if any problems  BMI Counseling: Body mass index is 30.58 kg/m². The BMI is above normal. Nutrition recommendations include decreasing portion sizes and moderation in carbohydrate intake. Exercise recommendations include exercising 3-5 times per week. Rationale for BMI follow-up plan is due to patient being overweight or obese. Depression Screening and Follow-up Plan: Patient was screened for depression during today's encounter. They screened negative with a PHQ-2 score of 0. Preventive health issues were discussed with patient, and age appropriate screening tests were ordered as noted in patient's After Visit Summary. Personalized health advice and appropriate referrals for health education or preventive services given if needed, as noted in patient's After Visit Summary. History of Present Illness:     Patient presents for a Medicare Wellness Visit    HPI 78-year old female coming in for a follow up office visit regarding hypothyroidism, prediabetes, obesity, hypertension and status post fall fall/shoulder contusion; the patient reports me compliant taking medications without untoward side effects the. The patient is here to review his medical condition, update me on the medical condition and the patient reports me no hospitalizations and no ER visits.   Patient does report to me 1 injury with a fall working with orthopedics/physical therapy contusion shoulder on the chair which was not secure [place] went to sit down , landed on the right shoulder; no head no neck injury, has ongoing shoulder pain since the fall; x-rays no fracture working with orthopedics Dr. Mirella Ramírez and working with physical therapy; also found to have difficulty with her peripheral vision secondary to drooping eyelid;working with opth Dr Melissa Green taking omega xl back pain better product of [facility]. Trying to follow healthy and balanced diet here to review laboratories in detail  Patient Care Team:  Lindsey Mckeon DO as PCP - MD Dimitris Barragan MD     Review of Systems:     Review of Systems   Constitutional:  Negative for activity change, appetite change and unexpected weight change. HENT:  Negative for congestion and postnasal drip. Eyes:  Negative for visual disturbance. Respiratory:  Negative for cough and shortness of breath. Cardiovascular:  Negative for chest pain. Gastrointestinal:  Negative for abdominal pain, diarrhea, nausea and vomiting. Neurological:  Negative for dizziness, light-headedness and headaches. Hematological:  Negative for adenopathy.    Shoulder pain  Drooping eyelid     Problem List:     Patient Active Problem List   Diagnosis   • Atrial fibrillation ([address])   • Benign essential hypertension   • Hypothyroidism   • Medicare annual wellness visit, subsequent   • Preoperative clearance   • Age-related cataract of both eyes   • Preop examination   • Age-related cataract   • Essential hypertension   • Screening for diabetes mellitus   • Screening for osteoporosis   • Meniere's disease   • Oral candida   • Oral ulcer   • Needs flu shot   • Snoring   • Hematoma and contusion   • Hematoma of left lower leg   • Vitamin D deficiency   • IFG (impaired fasting glucose)   • PTSD (post-traumatic stress disorder)   • Hip pain   • Intentional tremor   • Class 1 obesity due to excess calories with body mass index (BMI) of 30.0 to 30.9 in adult   • Acute pain of right shoulder   • Rotator cuff dysfunction, right      Past Medical and Surgical History:     Past Medical History:   Diagnosis Date   • Arthritis     "Everywhere"OA   • Atrial fibrillation (HCC)    • HL (hearing loss)    • Hypokalemia     last assessed: 2015   • Meniere's disease     Dx 40 yrs ago per pt. • Squamous cell skin cancer     (SCCIS)     Past Surgical History:   Procedure Laterality Date   • HYSTERECTOMY      partial  age 30      Family History:     Family History   Problem Relation Age of Onset   • Dementia Mother    • Colon cancer Father 80   • No Known Problems Sister    • No Known Problems Daughter    • No Known Problems Daughter    • No Known Problems Maternal Grandmother    • No Known Problems Maternal Grandfather    • No Known Problems Paternal Grandmother    • No Known Problems Paternal Grandfather    • No Known Problems Maternal Aunt    • No Known Problems Paternal Aunt    • No Known Problems Paternal Aunt    • No Known Problems Paternal Aunt    • Brain cancer Half-Sister 36         at 50      Social History:     Social History     Socioeconomic History   • Marital status: /Civil Union     Spouse name: None   • Number of children: None   • Years of education: None   • Highest education level: None   Occupational History   • None   Tobacco Use   • Smoking status: Former     Types: Cigarettes     Quit date:      Years since quittin.9   • Smokeless tobacco: Never   • Tobacco comments:     former smoker   Vaping Use   • Vaping Use: Never used   Substance and Sexual Activity   • Alcohol use: No     Comment: never drank alcohol   • Drug use: No   • Sexual activity: Not Currently   Other Topics Concern   • None   Social History Narrative   • None     Social Determinants of Health     Financial Resource Strain: Low Risk  (2023)    Overall Financial Resource Strain (CARDIA)    • Difficulty of Paying Living Expenses: Not hard at all   Food Insecurity: Not on file   Transportation Needs: No Transportation Needs (2023)    PRAPARE - Transportation    • Lack of Transportation (Medical): No    • Lack of Transportation (Non-Medical):  No   Physical Activity: Not on file   Stress: Not on file   Social Connections: Not on file   Intimate Partner Violence: Not on file   Housing Stability: Not on file      Medications and Allergies:     Current Outpatient Medications   Medication Sig Dispense Refill   • Acetaminophen 650 MG/20.3ML SOLN Take 650 mg by mouth every 4 (four) hours as needed      • Ascorbic Acid (VITAMIN C) 500 MG CAPS Take by mouth     • aspirin 81 MG tablet Take 1 tablet by mouth daily     • Calcium Carb-Cholecalciferol (CALTRATE 600+D) 600-800 MG-UNIT TABS Take by mouth     • Cholecalciferol (VITAMIN D-3) 1000 units CAPS Take by mouth     • Glucosamine 750 MG TABS Take by mouth     • levothyroxine 88 mcg tablet Take 1 tablet (88 mcg total) by mouth daily 30 tablet 0   • Loratadine 10 MG CAPS Take by mouth daily     • loratadine-pseudoephedrine (CLARITIN-D 24-HOUR)  mg per 24 hr tablet Take 1 tablet by mouth daily     • meclizine (ANTIVERT) 12.5 MG tablet Take 1 tablet (12.5 mg total) by mouth daily as needed for dizziness 30 tablet 0   • metoprolol tartrate (LOPRESSOR) 25 mg tablet TAKE ONE TABLET BY MOUTH EVERY 12 HOURS 180 tablet 1   • Multiple Vitamins-Minerals (CENTRUM SILVER ULTRA WOMENS) TABS Take by mouth     • Xiidra 5 % op solution Administer 1 drop to both eyes 2 (two) times a day       No current facility-administered medications for this visit. Allergies   Allergen Reactions   • Dust Mite Extract Allergic Rhinitis   • Fluticasone-Salmeterol Hives     Category: Allergy;    • Molds & Smuts Allergic Rhinitis   • Morphine Nausea Only   • Penicillins Hives     Category:  Allergy;    • Pollen Extract Allergic Rhinitis, Cough, Dermatitis and Itching   • Latex Rash      Immunizations:     Immunization History   Administered Date(s) Administered   • COVID-19 PFIZER VACCINE 0.3 ML IM 01/18/2021, 02/08/2021   • INFLUENZA 10/05/2018   • Influenza Split High Dose Preservative Free IM 08/17/2015   • Influenza, high dose seasonal 0.7 mL 11/04/2019, 10/07/2020, 11/17/2021, 11/18/2022, 11/21/2023   • Influenza, seasonal, injectable 11/26/2012, 09/23/2015   • Influenza, seasonal, injectable, preservative free 09/23/2016   • Pneumococcal Conjugate 13-Valent 08/18/2015   • Pneumococcal Polysaccharide PPV23 07/22/2003   • Td (adult), adsorbed 07/22/2005   • Zoster 02/25/2013   • influenza, trivalent, adjuvanted 10/05/2018      Health Maintenance:         Topic Date Due   • Colorectal Cancer Screening  05/10/2023         Topic Date Due   • Pneumococcal Vaccine: 65+ Years (3 - PPSV23 if available, else PCV20) 08/18/2016   • COVID-19 Vaccine (3 - Pfizer series) 04/05/2021      Medicare Screening Tests and Risk Assessments: Anita Whaley is here for her Subsequent Wellness visit. Health Risk Assessment:   Patient rates overall health as good. Patient feels that their physical health rating is same. Patient is satisfied with their life. Eyesight was rated as same. Hearing was rated as same. Patient feels that their emotional and mental health rating is same. Patients states they are sometimes angry. Patient states they are sometimes unusually tired/fatigued. Pain experienced in the last 7 days has been some. Patient's pain rating has been 4/10. Patient states that she has experienced no weight loss or gain in last 6 months. Depression Screening:   PHQ-2 Score: 0      Fall Risk Screening: In the past year, patient has experienced: history of falling in past year    Number of falls: 1  Injured during fall?: Yes    Feels unsteady when standing or walking?: Yes    Worried about falling?: No      Urinary Incontinence Screening:   Patient has leaked urine accidently in the last six months. Home Safety:  Patient does not have trouble with stairs inside or outside of their home. Patient has working smoke alarms and has working carbon monoxide detector. Home safety hazards include: none. Nutrition:   Current diet is Regular. Medications:   Patient is currently taking over-the-counter supplements.  OTC medications include: see medication list. Patient is able to manage medications. Activities of Daily Living (ADLs)/Instrumental Activities of Daily Living (IADLs):   Walk and transfer into and out of bed and chair?: Yes  Dress and groom yourself?: Yes    Bathe or shower yourself?: Yes    Feed yourself? Yes  Do your laundry/housekeeping?: Yes  Manage your money, pay your bills and track your expenses?: Yes  Make your own meals?: Yes    Do your own shopping?: Yes    Previous Hospitalizations:   Any hospitalizations or ED visits within the last 12 months?: Yes    How many hospitalizations have you had in the last year?: 1-2    Hospitalization Comments: er    Advance Care Planning:   Living will: No    Durable POA for healthcare: Yes    Advanced directive: No    End of Life Decisions reviewed with patient: Yes      Cognitive Screening:   Provider or family/friend/caregiver concerned regarding cognition?: No    PREVENTIVE SCREENINGS      Cardiovascular Screening:    General: Screening Current      Diabetes Screening:     General: Screening Current      Colorectal Cancer Screening:     General: Screening Not Indicated      Breast Cancer Screening:     General: Screening Current      Cervical Cancer Screening:    General: Screening Not Indicated      Lung Cancer Screening:     General: Screening Not Indicated    Screening, Brief Intervention, and Referral to Treatment (SBIRT)    Screening  Typical number of drinks in a day: 0  Typical number of drinks in a week: 0  Interpretation: Low risk drinking behavior. Single Item Drug Screening:  How often have you used an illegal drug (including marijuana) or a prescription medication for non-medical reasons in the past year? never    Single Item Drug Screen Score: 0  Interpretation: Negative screen for possible drug use disorder    No results found.      Physical Exam:     /80   Pulse 66   Resp 16   Ht 5' 5.5" (1.664 m)   Wt 84.6 kg (186 lb 9.6 oz)   SpO2 97%   BMI 30.58 kg/m² Physical Exam  Vitals and nursing note reviewed. Constitutional:       General: She is not in acute distress. Appearance: Normal appearance. She is well-developed. She is obese. She is not ill-appearing, toxic-appearing or diaphoretic. HENT:      Head: Normocephalic and atraumatic. Right Ear: External ear normal.      Left Ear: External ear normal.      Nose: Nose normal.   Eyes:      Pupils: Pupils are equal, round, and reactive to light. Cardiovascular:      Rate and Rhythm: Normal rate and regular rhythm. Heart sounds: Normal heart sounds. No murmur heard. Pulmonary:      Effort: Pulmonary effort is normal.      Breath sounds: Normal breath sounds. Abdominal:      General: There is no distension. Palpations: Abdomen is soft. Tenderness: There is no abdominal tenderness. There is no guarding. Neurological:      Mental Status: She is alert.           Anita Pierre,

## 2023-11-27 NOTE — ASSESSMENT & PLAN NOTE
Problem: Pain  Goal: *Control of Pain  Outcome: Progressing Towards Goal     Problem: Patient Education: Go to Patient Education Activity  Goal: Patient/Family Education  Outcome: Progressing Towards Goal     Problem: Falls - Risk of  Goal: *Absence of Falls  Description: Document Skyler Riojas Fall Risk and appropriate interventions in the flowsheet.   Outcome: Progressing Towards Goal  Note: Fall Risk Interventions:  Mobility Interventions: Bed/chair exit alarm, Strengthening exercises (ROM-active/passive), Patient to call before getting OOB, Utilize walker, cane, or other assistive device         Medication Interventions: Bed/chair exit alarm, Evaluate medications/consider consulting pharmacy, Patient to call before getting OOB, Teach patient to arise slowly    Elimination Interventions: Bed/chair exit alarm, Call light in reach, Patient to call for help with toileting needs, Toileting schedule/hourly rounds    History of Falls Interventions: Bed/chair exit alarm, Evaluate medications/consider consulting pharmacy, Room close to nurse's station         Problem: Patient Education: Go to Patient Education Activity  Goal: Patient/Family Education  Outcome: Progressing Towards Goal Status post fall x-ray no fracture working with orthopedics Dr. Romeo Acuna and physical therapy making slow progress range of motion exercises advised

## 2023-11-27 NOTE — ASSESSMENT & PLAN NOTE
Assessment and plan 1. Medicare subsequent annual wellness examination overall the patient is clinically stable and doing well, we encouraged the patient to follow a healthy and balanced diet. We recommend that the patient exercise routinely approximately 30 minutes 5 times per week . We have reviewed the patient's vaccines and have made recommendations for updates if necessary   annual flu shot, consider RSV and COVID booster   . We will be ordering screening laboratories which are age appropriate. Return to the office in    6 months call if any problems.

## 2023-11-28 ENCOUNTER — OFFICE VISIT (OUTPATIENT)
Dept: PHYSICAL THERAPY | Age: 83
End: 2023-11-28
Payer: COMMERCIAL

## 2023-11-28 DIAGNOSIS — M25.511 ACUTE PAIN OF RIGHT SHOULDER: ICD-10-CM

## 2023-11-28 DIAGNOSIS — M67.911 ROTATOR CUFF DYSFUNCTION, RIGHT: Primary | ICD-10-CM

## 2023-11-28 PROCEDURE — 97110 THERAPEUTIC EXERCISES: CPT

## 2023-11-28 NOTE — PROGRESS NOTES
Daily Note     Today's date: 2023  Patient name: Angelica Trujillo  : 1940  MRN: [id number]  Referring provider: Patrick Angeles*  Dx:   Encounter Diagnosis     ICD-10-CM    1. Rotator cuff dysfunction, right  M67.911       2. Acute pain of right shoulder  M25.511                      Subjective: Pt reports that she feels like her shoulder is a little more mobile. She has been trying to wean off of tylenol. Objective: See treatment diary below      Assessment: Pt with significantly improved PROM and AAROM compared to IE. She does exhibit gross shoulder weakness which is affecting AROM in both supine and standing. Provided pt with HEP for deltoid isometrics which she demonstrated understanding of completion. Tolerated treatment well. Patient demonstrated fatigue post treatment, exhibited good technique with therapeutic exercises, and would benefit from continued PT      Plan: Continue per plan of care. Progress treatment as tolerated.        Precautions: R shoulder RTC tear      Manuals 11/15 11/28           R shoulder PROM  NR           Stabilization MRE's                                       Neuro Re-Ed                                                                                                        Ther Ex             Pulleys 5' 6'           Table slides X15 flex+abd 3x10 ea           Scap retractions  3x10           Deltoid isometrics (flex/abd/ext)  10"x10            Bicep/tricep PRE's  nv           AAROM flex w/ cane  3x10                                     Ther Activity                                       Gait Training                                       Modalities

## 2023-12-04 ENCOUNTER — OFFICE VISIT (OUTPATIENT)
Dept: PHYSICAL THERAPY | Age: 83
End: 2023-12-04
Payer: COMMERCIAL

## 2023-12-04 DIAGNOSIS — M67.911 ROTATOR CUFF DYSFUNCTION, RIGHT: Primary | ICD-10-CM

## 2023-12-04 DIAGNOSIS — M25.511 ACUTE PAIN OF RIGHT SHOULDER: ICD-10-CM

## 2023-12-04 PROCEDURE — 97110 THERAPEUTIC EXERCISES: CPT

## 2023-12-04 PROCEDURE — 97140 MANUAL THERAPY 1/> REGIONS: CPT

## 2023-12-04 NOTE — PROGRESS NOTES
Daily Note     Today's date: 2023  Patient name: Yinka Reyes  : 1940  MRN: [id number]  Referring provider: Olegario Wadsworth*  Dx: No diagnosis found. Subjective: Pt reports shoulder is okay but the rain made her symptoms increase. Objective: See treatment diary below      Assessment: Pt with improving PROM as she only has limitations into ER in supine. Continues to demonstrate gross rotator cuff weakness which is limiting her AROM in all planes of R shoulder motion. Tolerated treatment well. Patient demonstrated fatigue post treatment, exhibited good technique with therapeutic exercises, and would benefit from continued PT      Plan: Continue per plan of care. Progress treatment as tolerated.        Precautions: R shoulder RTC tear      Manuals 11/15 11/28 12/4          R shoulder PROM  NR NR          Stabilization MRE's                                       Neuro Re-Ed                                                                                                        Ther Ex             Pulleys 5' 6' 6'          Table slides X15 flex+abd 3x10 ea 3x10           Scap retractions  3x10 2x10          Deltoid isometrics (flex/abd/ext)  10"x10  10"x10          Bicep/tricep PRE's  nv nv          AAROM flex w/ cane  3x10 3x10           Corner stretch    30"x3          Single arm supine flex   2x10 min A          Ther Activity                                       Gait Training                                       Modalities

## 2023-12-06 ENCOUNTER — OFFICE VISIT (OUTPATIENT)
Dept: PHYSICAL THERAPY | Age: 83
End: 2023-12-06
Payer: COMMERCIAL

## 2023-12-06 DIAGNOSIS — M67.911 ROTATOR CUFF DYSFUNCTION, RIGHT: Primary | ICD-10-CM

## 2023-12-06 DIAGNOSIS — M25.511 ACUTE PAIN OF RIGHT SHOULDER: ICD-10-CM

## 2023-12-06 PROCEDURE — 97140 MANUAL THERAPY 1/> REGIONS: CPT

## 2023-12-06 PROCEDURE — 97110 THERAPEUTIC EXERCISES: CPT

## 2023-12-06 NOTE — PROGRESS NOTES
Daily Note     Today's date: 2023  Patient name: Umm Smith  : 1940  MRN: [id number]  Referring provider: Gabe Contreras*  Dx:   Encounter Diagnosis     ICD-10-CM    1. Rotator cuff dysfunction, right  M67.911       2. Acute pain of right shoulder  M25.511                      Subjective: Pt reports her shoulder is still sore. She did not take any Tylenol or put any icy-hot on her shoulder      Objective: See treatment diary below      Assessment: Pt continues to have ROM limitations into external rotation and end-range flexion passively. Significant AROM limitations 2* underlying rotator cuff tear and gross weakness in R shoulder. Requires min A to lift RUE into flexion while in supine and is unable to complete wall slides in standing. Able to complete AAROM exercises to tolerance. Tolerated treatment well. Patient demonstrated fatigue post treatment, exhibited good technique with therapeutic exercises, and would benefit from continued PT      Plan: Continue per plan of care. Progress treatment as tolerated.        Precautions: R shoulder RTC tear      Manuals 11/15 11/28 12/4 12/6         R shoulder PROM  NR NR NR         Stabilization MRE's                                       Neuro Re-Ed                                                                                                        Ther Ex             Pulleys 5' 6' 6' 5'         Table slides X15 flex+abd 3x10 ea 3x10  3x10          Scap retractions  3x10 2x10 3x10         Deltoid isometrics (flex/abd/ext)  10"x10  10"x10 10"x10         Bicep/tricep PRE's  nv nv          AAROM flex w/ cane  3x10 3x10  3x10          Corner stretch    30"x3 30"x3         Single arm supine flex   2x10 min A 2x10 min A         Rows/ext w/ TB             Ther Activity                                       Gait Training                                       Modalities

## 2023-12-11 ENCOUNTER — OFFICE VISIT (OUTPATIENT)
Dept: PHYSICAL THERAPY | Age: 83
End: 2023-12-11
Payer: COMMERCIAL

## 2023-12-11 DIAGNOSIS — M67.911 ROTATOR CUFF DYSFUNCTION, RIGHT: Primary | ICD-10-CM

## 2023-12-11 PROCEDURE — 97110 THERAPEUTIC EXERCISES: CPT

## 2023-12-11 PROCEDURE — 97140 MANUAL THERAPY 1/> REGIONS: CPT

## 2023-12-11 NOTE — PROGRESS NOTES
Daily Note     Today's date: 2023  Patient name: Lizeth Ulrich  : 1940  MRN: [id number]  Referring provider: Verona Soria*  Dx: No diagnosis found. Subjective: Pt offers no new complaints. Still having difficulty lifting her R arm on her own but can raise it with her other arm. Objective: See treatment diary below      Assessment: Pt has normalized PROM and AAROM with R shoulder but continues to lack adequate strength of deltoids and rotator cuff leading to significantly decreased AROM. She tolerated scapular strengthening with resistance bands today. She was also able to perform shoulder flexion wall slides but did fatigue quickly with low repetitions. Tolerated treatment well. Patient demonstrated fatigue post treatment, exhibited good technique with therapeutic exercises, and would benefit from continued PT      Plan: Continue per plan of care. Progress treatment as tolerated.        Precautions: R shoulder RTC tear      Manuals 11/15 11/28 12/4 12/6 12/11        R shoulder PROM  NR NR NR NR        Stabilization MRE's                                       Neuro Re-Ed                                                                                                        Ther Ex             Pulleys 5' 6' 6' 5' 6'        Table slides X15 flex+abd 3x10 ea 3x10  3x10  Wall slids 2x5        Scap retractions  3x10 2x10 3x10 3x10        Deltoid isometrics (flex/abd/ext)  10"x10  10"x10 10"x10 10"x10        Bicep/tricep PRE's  nv nv          AAROM flex w/ cane  3x10 3x10  3x10  3x10        Corner stretch    30"x3 30"x3         Single arm supine flex   2x10 min A 2x10 min A 3x10 min A        Rows/ext w/ TB     2x10 GTB        Ther Activity                                       Gait Training                                       Modalities

## 2023-12-12 ENCOUNTER — OFFICE VISIT (OUTPATIENT)
Dept: CARDIOLOGY CLINIC | Facility: CLINIC | Age: 83
End: 2023-12-12
Payer: COMMERCIAL

## 2023-12-12 VITALS
SYSTOLIC BLOOD PRESSURE: 140 MMHG | WEIGHT: 188.4 LBS | HEART RATE: 51 BPM | DIASTOLIC BLOOD PRESSURE: 66 MMHG | HEIGHT: 66 IN | BODY MASS INDEX: 30.28 KG/M2

## 2023-12-12 DIAGNOSIS — I10 BENIGN ESSENTIAL HYPERTENSION: ICD-10-CM

## 2023-12-12 DIAGNOSIS — I48.0 PAROXYSMAL ATRIAL FIBRILLATION (HCC): Primary | ICD-10-CM

## 2023-12-12 PROCEDURE — 99213 OFFICE O/P EST LOW 20 MIN: CPT | Performed by: INTERNAL MEDICINE

## 2023-12-12 NOTE — PROGRESS NOTES
Cardiology Follow Up    Dayna Cesar  1940  [id number]  [id number] [facility] CATH LAB  [address]  [facility]  [phone]    1. Paroxysmal atrial fibrillation (HCC)        2. Benign essential hypertension            Interval History: Cardiology follow-up. Patient is doing well from the cardiac point of view denies any chest pain or dyspnea. No significant palpitations, denies any focal neurological deficits or amaurosis fugax. No significant bleeding issues on chronic aspirin therapy. Lipids this year total is 179, HDL 61, LDL 97.  Patient will require eye surgery, she needs clearance for that as well. Patient Active Problem List   Diagnosis    Atrial fibrillation ([address])    Benign essential hypertension    Hypothyroidism    Medicare annual wellness visit, subsequent    Preoperative clearance    Age-related cataract of both eyes    Preop examination    Age-related cataract    Essential hypertension    Screening for diabetes mellitus    Screening for osteoporosis    Meniere's disease    Oral candida    Oral ulcer    Needs flu shot    Snoring    Hematoma and contusion    Hematoma of left lower leg    Vitamin D deficiency    IFG (impaired fasting glucose)    PTSD (post-traumatic stress disorder)    Hip pain    Intentional tremor    Class 1 obesity due to excess calories with body mass index (BMI) of 30.0 to 30.9 in adult    Acute pain of right shoulder    Rotator cuff dysfunction, right     Past Medical History:   Diagnosis Date    Arthritis     "Everywhere"OA    Atrial fibrillation ([address])     HL (hearing loss)     Hypokalemia     last assessed: 4/25/2015    Meniere's disease     Dx 40 yrs ago per pt.     Squamous cell skin cancer     (SCCIS)     Social History     Socioeconomic History    Marital status: /Civil Union     Spouse name: Not on file    Number of children: Not on file    Years of education: Not on file Highest education level: Not on file   Occupational History    Not on file   Tobacco Use    Smoking status: Former     Types: Cigarettes     Quit date: 80     Years since quittin.9    Smokeless tobacco: Never    Tobacco comments:     former smoker   Vaping Use    Vaping Use: Never used   Substance and Sexual Activity    Alcohol use: No     Comment: never drank alcohol    Drug use: No    Sexual activity: Not Currently   Other Topics Concern    Not on file   Social History Narrative    Not on file     Social Determinants of Health     Financial Resource Strain: Low Risk  (2023)    Overall Financial Resource Strain (CARDIA)     Difficulty of Paying Living Expenses: Not hard at all   Food Insecurity: Not on file   Transportation Needs: No Transportation Needs (2023)    PRAPARE - Transportation     Lack of Transportation (Medical): No     Lack of Transportation (Non-Medical):  No   Physical Activity: Not on file   Stress: Not on file   Social Connections: Not on file   Intimate Partner Violence: Not on file   Housing Stability: Not on file      Family History   Problem Relation Age of Onset    Dementia Mother     Colon cancer Father 80    No Known Problems Sister     No Known Problems Daughter     No Known Problems Daughter     No Known Problems Maternal Grandmother     No Known Problems Maternal Grandfather     No Known Problems Paternal Grandmother     No Known Problems Paternal Grandfather     No Known Problems Maternal Aunt     No Known Problems Paternal Aunt     No Known Problems Paternal Aunt     No Known Problems Paternal Aunt     Brain cancer Half-Sister 36         at 50     Past Surgical History:   Procedure Laterality Date    HYSTERECTOMY      partial  age 30       Current Outpatient Medications:     Acetaminophen 650 MG/20.3ML SOLN, Take 650 mg by mouth every 4 (four) hours as needed , Disp: , Rfl:     Ascorbic Acid (VITAMIN C) 500 MG CAPS, Take by mouth, Disp: , Rfl:     aspirin 81 MG tablet, Take 1 tablet by mouth daily, Disp: , Rfl:     Calcium Carb-Cholecalciferol (CALTRATE 600+D) 600-800 MG-UNIT TABS, Take by mouth, Disp: , Rfl:     Cholecalciferol (VITAMIN D-3) 1000 units CAPS, Take by mouth, Disp: , Rfl:     Glucosamine 750 MG TABS, Take by mouth, Disp: , Rfl:     levothyroxine 88 mcg tablet, Take 1 tablet (88 mcg total) by mouth daily, Disp: 30 tablet, Rfl: 0    Loratadine 10 MG CAPS, Take by mouth daily, Disp: , Rfl:     loratadine-pseudoephedrine (CLARITIN-D 24-HOUR)  mg per 24 hr tablet, Take 1 tablet by mouth daily, Disp: , Rfl:     meclizine (ANTIVERT) 12.5 MG tablet, Take 1 tablet (12.5 mg total) by mouth daily as needed for dizziness, Disp: 30 tablet, Rfl: 0    metoprolol tartrate (LOPRESSOR) 25 mg tablet, TAKE ONE TABLET BY MOUTH EVERY 12 HOURS, Disp: 180 tablet, Rfl: 1    Multiple Vitamins-Minerals (CENTRUM SILVER ULTRA WOMENS) TABS, Take by mouth, Disp: , Rfl:     Xiidra 5 % op solution, Administer 1 drop to both eyes 2 (two) times a day, Disp: , Rfl:   Allergies   Allergen Reactions    Dust Mite Extract Allergic Rhinitis    Fluticasone-Salmeterol Hives     Category: Allergy;     Molds & Smuts Allergic Rhinitis    Morphine Nausea Only    Penicillins Hives     Category: Allergy;      Pollen Extract Allergic Rhinitis, Cough, Dermatitis and Itching    Latex Rash       Labs:  Appointment on 11/17/2023   Component Date Value    A/G Ratio 11/17/2023 1.52     Albumin Electrophoresis 11/17/2023 60.3     Albumin CONC 11/17/2023 4.10     Alpha 1 11/17/2023 4.2     ALPHA 1 CONC 11/17/2023 0.29     Alpha 2 11/17/2023 9.0     ALPHA 2 CONC 11/17/2023 0.61     Beta-1 11/17/2023 5.3     BETA 1 CONC 11/17/2023 0.36     Beta-2 11/17/2023 4.9     BETA 2 CONC 11/17/2023 0.33     Gamma Globulin 11/17/2023 16.3     GAMMA CONC 11/17/2023 1.11     Total Protein 11/17/2023 6.8     SPEP Interpretation 11/17/2023 See Comment     Urine Protein 11/17/2023 8.1     Albumin ELP, Urine 11/17/2023 100.0 Alpha 1, Urine 11/17/2023 0.0     Alpha 2, Urine 11/17/2023 0.0     Beta, Urine 11/17/2023 0.0     Gamma Globulin, Urine 11/17/2023 0.0     UPEP Interp 11/17/2023 See Comment     Sodium 11/17/2023 139     Potassium 11/17/2023 4.0     Chloride 11/17/2023 103     CO2 11/17/2023 27     ANION GAP 11/17/2023 9     BUN 11/17/2023 11     Creatinine 11/17/2023 0.68     Glucose, Fasting 11/17/2023 100 (H)     Calcium 11/17/2023 9.7     AST 11/17/2023 24     ALT 11/17/2023 18     Alkaline Phosphatase 11/17/2023 77     Total Protein 11/17/2023 7.0     Albumin 11/17/2023 4.2     Total Bilirubin 11/17/2023 0.65     eGFR 11/17/2023 80     Hemoglobin A1C 11/17/2023 5.8 (H)     EAG 11/17/2023 120     Cholesterol 11/17/2023 179     Triglycerides 11/17/2023 106     HDL, Direct 11/17/2023 61     LDL Calculated 11/17/2023 97     TSH 3RD GENERATON 11/17/2023 2.502      Imaging: No results found. Review of Systems:  Review of Systems   Constitutional:  Negative for activity change and fatigue. HENT:  Negative for nosebleeds. Respiratory:  Negative for apnea, shortness of breath, wheezing and stridor. Cardiovascular:  Negative for chest pain, palpitations and leg swelling. Gastrointestinal:  Negative for abdominal pain, anal bleeding and blood in stool. Genitourinary:  Negative for hematuria. Musculoskeletal:  Positive for arthralgias and gait problem. Neurological:  Negative for dizziness, syncope and weakness. Hematological:  Does not bruise/bleed easily. Psychiatric/Behavioral:  Negative for sleep disturbance. Physical Exam:  Physical Exam  Vitals reviewed. Constitutional:       General: She is not in acute distress. Appearance: Normal appearance. She is not ill-appearing, toxic-appearing or diaphoretic. Eyes:      General: No scleral icterus. Neck:      Vascular: No carotid bruit. Cardiovascular:      Rate and Rhythm: Regular rhythm. Bradycardia present. Pulses: Normal pulses.       Heart sounds: Normal heart sounds. No murmur heard. No friction rub. No gallop. Pulmonary:      Effort: Pulmonary effort is normal. No respiratory distress. Breath sounds: Normal breath sounds. No stridor. No wheezing, rhonchi or rales. Musculoskeletal:      Right lower leg: No edema. Left lower leg: No edema. Skin:     General: Skin is warm and dry. Capillary Refill: Capillary refill takes less than 2 seconds. Neurological:      Mental Status: She is alert. Psychiatric:         Mood and Affect: Mood normal.         Discussion/Summary:    Paroxysmal atrial fibrillation, 1st noted in 2014 in the setting of admission with pneumonia. She has remained clinically and electrocardiographically in sinus rhythm over the years. Currently only on aspirin  And beta-blocker, stress test  2016 suggested no ischemia, she did 6 minutes of Evan protocol, echocardiographic portion the suggested no ischemia left systolic function is normal.   2 week monitor 2022 revealed normal sinus rhythm, short bursts of SVT were noted and symptomatic PVCs as well, there was no atrial fibrillation. Continue current medication, will repeat a Holter monitor. Patient is cleared for eye surgery, okay to withhold aspirin therapy 1 week prior. .     This note was completed in part utilizing [employer] direct voice recognition software. Grammatical errors, random word insertion, spelling mistakes, and incomplete sentences may be an occasional consequence of the system secondary to software limitations, ambient noise and hardware issues. At the time of dictation, efforts were made to edit, clarify and /or correct errors. Please read the chart carefully and recognize, using context, where substitutions have occurred. If you have any questions or concerns about the context, text or information contained within the body of this dictation, please contact myself, the provider, for further clarification.

## 2023-12-13 ENCOUNTER — OFFICE VISIT (OUTPATIENT)
Dept: PHYSICAL THERAPY | Age: 83
End: 2023-12-13
Payer: COMMERCIAL

## 2023-12-13 DIAGNOSIS — M67.911 ROTATOR CUFF DYSFUNCTION, RIGHT: Primary | ICD-10-CM

## 2023-12-13 DIAGNOSIS — M25.511 ACUTE PAIN OF RIGHT SHOULDER: ICD-10-CM

## 2023-12-13 PROCEDURE — 97140 MANUAL THERAPY 1/> REGIONS: CPT

## 2023-12-13 PROCEDURE — 97110 THERAPEUTIC EXERCISES: CPT

## 2023-12-13 NOTE — PROGRESS NOTES
Daily Note     Today's date: 2023  Patient name: Angelica Trujillo  : 1940  MRN: [id number]  Referring provider: Patrick Angeles*  Dx:   Encounter Diagnosis     ICD-10-CM    1. Rotator cuff dysfunction, right  M67.911       2. Acute pain of right shoulder  M25.511                      Subjective: Pt reports that the night before last she had a lot of pain in the back of the shoulder. Objective: See treatment diary below      Assessment: Pt continues to lack adequate strength in R shoulder to perform overhead activities and achieve normal AROM. Experiences symptoms with end-range external rotation but there are no capsular or joint limitations. Continues to have difficulty with standing AAROM into flexion and abduction. She would benefit from continued OP PT in order to attempt to improve R shoulder strength and improve function/use of RUE. Tolerated treatment well. Patient demonstrated fatigue post treatment, exhibited good technique with therapeutic exercises, and would benefit from continued PT      Plan: Continue per plan of care.       Precautions: R shoulder RTC tear      Manuals 11/15 11/28 12/4 12/6 12/11 12/13       R shoulder PROM  NR NR NR NR NR       Stabilization MRE's                                       Neuro Re-Ed                                                                                                        Ther Ex             Pulleys 5' 6' 6' 5' 6' 6'       Table slides X15 flex+abd 3x10 ea 3x10  3x10  Wall slids 2x5 Wall slides x13       Scap retractions  3x10 2x10 3x10 3x10 3x10        Deltoid isometrics (flex/abd/ext)  10"x10  10"x10 10"x10 10"x10 10"x10       Bicep/tricep PRE's  nv nv          AAROM flex w/ cane  3x10 3x10  3x10  3x10 3x10       Corner stretch    30"x3 30"x3         Single arm supine flex   2x10 min A 2x10 min A 3x10 min A 3x10 min A       Rows/ext w/ TB     2x10 GTB 3x10 GTB       Ther Activity                                       Gait Training Modalities

## 2023-12-18 ENCOUNTER — PROCEDURE VISIT (OUTPATIENT)
Dept: CARDIOLOGY CLINIC | Facility: CLINIC | Age: 83
End: 2023-12-18

## 2023-12-18 ENCOUNTER — APPOINTMENT (OUTPATIENT)
Dept: PHYSICAL THERAPY | Age: 83
End: 2023-12-18
Payer: COMMERCIAL

## 2023-12-18 DIAGNOSIS — I48.0 PAROXYSMAL ATRIAL FIBRILLATION (HCC): ICD-10-CM

## 2023-12-18 PROCEDURE — RECHECK: Performed by: INTERNAL MEDICINE

## 2023-12-19 DIAGNOSIS — E03.9 HYPOTHYROIDISM, UNSPECIFIED TYPE: ICD-10-CM

## 2023-12-19 RX ORDER — LEVOTHYROXINE SODIUM 88 UG/1
88 TABLET ORAL DAILY
Qty: 30 TABLET | Refills: 0 | Status: SHIPPED | OUTPATIENT
Start: 2023-12-19

## 2023-12-20 ENCOUNTER — OFFICE VISIT (OUTPATIENT)
Dept: PHYSICAL THERAPY | Age: 83
End: 2023-12-20
Payer: COMMERCIAL

## 2023-12-20 DIAGNOSIS — M67.911 ROTATOR CUFF DYSFUNCTION, RIGHT: Primary | ICD-10-CM

## 2023-12-20 PROCEDURE — 97110 THERAPEUTIC EXERCISES: CPT

## 2023-12-20 PROCEDURE — 97140 MANUAL THERAPY 1/> REGIONS: CPT

## 2023-12-20 NOTE — PROGRESS NOTES
"Daily Note     Today's date: 2023  Patient name: Peg Garrett  : 1940  MRN: [id number]  Referring provider: Arsen Devine*  Dx:   Encounter Diagnosis     ICD-10-CM    1. Rotator cuff dysfunction, right  M67.911                      Subjective: Pt reports she has trouble doing her hair. She is having less pain and she usually feels better after PT      Objective: See treatment diary below      Assessment: Pt with improved shoulder flexion in supine with one arm as she needed less assistance. Attempted s/l ER but pt lacked adequate strength so therapist performed eccentric lowering which was tolerated fair. Contraction of external rotators is also weak when in 90/90 position. She would benefit from continued OP PT in order to improve ROM and strength in R shoulder. Tolerated treatment well. Patient demonstrated fatigue post treatment, exhibited good technique with therapeutic exercises, and would benefit from continued PT      Plan: Continue per plan of care.  Progress treatment as tolerated.       Precautions: R shoulder RTC tear      Manuals 11/15 11/28 12/4 12/6 12/11 12/13 12/20      R shoulder PROM  NR NR NR NR NR NR      Stabilization MRE's       90/90 ER MRE 2x10      S/L ER eccentric       1x15                   Neuro Re-Ed                                                                                                        Ther Ex             Pulleys 5' 6' 6' 5' 6' 6' 6'      Table slides X15 flex+abd 3x10 ea 3x10  3x10  Wall slids 2x5 Wall slides x13 Wall slides 2x10      Scap retractions  3x10 2x10 3x10 3x10 3x10        Deltoid isometrics (flex/abd/ext)  10\"x10  10\"x10 10\"x10 10\"x10 10\"x10 10\"x10      Bicep/tricep PRE's  nv nv          AAROM flex w/ cane  3x10 3x10  3x10  3x10 3x10 3x10      Corner stretch    30\"x3 30\"x3   30\"x3      Single arm supine flex   2x10 min A 2x10 min A 3x10 min A 3x10 min A 3x10 min A      Rows/ext w/ TB     2x10 GTB 3x10 GTB 3x10 BTB      Ther Activity  "                                      Gait Training                                       Modalities

## 2023-12-22 ENCOUNTER — OFFICE VISIT (OUTPATIENT)
Dept: PHYSICAL THERAPY | Age: 83
End: 2023-12-22
Payer: COMMERCIAL

## 2023-12-22 DIAGNOSIS — M25.511 ACUTE PAIN OF RIGHT SHOULDER: ICD-10-CM

## 2023-12-22 DIAGNOSIS — M67.911 ROTATOR CUFF DYSFUNCTION, RIGHT: Primary | ICD-10-CM

## 2023-12-22 PROCEDURE — 97140 MANUAL THERAPY 1/> REGIONS: CPT

## 2023-12-22 PROCEDURE — 97110 THERAPEUTIC EXERCISES: CPT

## 2023-12-22 NOTE — PROGRESS NOTES
"Daily Note     Today's date: 2023  Patient name: Peg Garrett  : 1940  MRN: [id number]  Referring provider: Arsen Devine*  Dx:   Encounter Diagnosis     ICD-10-CM    1. Rotator cuff dysfunction, right  M67.911       2. Acute pain of right shoulder  M25.511           Start Time: 0958  Stop Time: 1045  Total time in clinic (min): 47 minutes    Subjective: Pt reports she is doing good today. Has no pain or chief complaints noted upon arrival.       Objective: See treatment diary below      Assessment: Tolerated treatment well. Pt demonstrates good recall of ex's, with improving functional mobility noted. Pt was able to complete supine shld flexion actively in between reps without assistance but fatigued quite a bit. Pt will benefit from continued skilled PT for progression of ex's and in order to achieve established PT goals. Patient demonstrated fatigue post treatment, exhibited good technique with therapeutic exercises, and would benefit from continued PT      Plan: Continue per plan of care.      Precautions: R shoulder RTC tear      Manuals 11/15 11/28 12/4 12/6 12/11 12/13 12/20 12/22     R shoulder PROM  NR NR NR NR NR NR NA     Stabilization MRE's       90/90 ER MRE 2x10 90/90 ER MRE 2x10     S/L ER eccentric       1x15                   Neuro Re-Ed                                                                                                        Ther Ex             Pulleys 5' 6' 6' 5' 6' 6' 6' 6'      Table slides X15 flex+abd 3x10 ea 3x10  3x10  Wall slids 2x5 Wall slides x13 Wall slides 2x10 Wall slides 3x10     Scap retractions  3x10 2x10 3x10 3x10 3x10   3x10     Deltoid isometrics (flex/abd/ext)  10\"x10  10\"x10 10\"x10 10\"x10 10\"x10 10\"x10 10\"x10     Bicep/tricep PRE's  nv nv          AAROM flex w/ cane  3x10 3x10  3x10  3x10 3x10 3x10 3x10     Corner stretch    30\"x3 30\"x3   30\"x3 30\"x3     Single arm supine flex   2x10 min A 2x10 min A 3x10 min A 3x10 min A 3x10 min A 3x10  " min A     Rows/ext w/ TB     2x10 GTB 3x10 GTB 3x10 BTB 3x10 BTB     Ther Activity                                       Gait Training                                       Modalities

## 2023-12-27 ENCOUNTER — OFFICE VISIT (OUTPATIENT)
Dept: PHYSICAL THERAPY | Age: 83
End: 2023-12-27
Payer: COMMERCIAL

## 2023-12-27 DIAGNOSIS — M25.511 ACUTE PAIN OF RIGHT SHOULDER: ICD-10-CM

## 2023-12-27 DIAGNOSIS — M67.911 ROTATOR CUFF DYSFUNCTION, RIGHT: Primary | ICD-10-CM

## 2023-12-27 PROCEDURE — 97110 THERAPEUTIC EXERCISES: CPT

## 2023-12-27 PROCEDURE — 97140 MANUAL THERAPY 1/> REGIONS: CPT

## 2023-12-27 NOTE — PROGRESS NOTES
"Daily Note     Today's date: 2023  Patient name: Peg Garrett  : 1940  MRN: [id number]  Referring provider: Arsen Devine*  Dx:   Encounter Diagnosis     ICD-10-CM    1. Rotator cuff dysfunction, right  M67.911       2. Acute pain of right shoulder  M25.511                      Subjective: Pt reports she is able to reach a little bit higher with her R hand.       Objective: See treatment diary below      Assessment: Pt continues to lack adequate external rotation strength which is affecting function. Improve standing flexion and abduction AROM at start of session but as she fatigued, the ROM decreased. Some activation of external rotators with ER isometric in supine. Tolerated treatment well. Patient demonstrated fatigue post treatment, exhibited good technique with therapeutic exercises, and would benefit from continued PT      Plan: Continue per plan of care.  Progress treatment as tolerated.       Precautions: R shoulder RTC tear      Manuals 11/15 11/28 12/4 12/6 12/11 12/13 12/20 12/22 12/27    R shoulder PROM  NR NR NR NR NR NR NA NR    Stabilization MRE's       90/90 ER MRE 2x10 90/90 ER MRE 2x10 90/90 MRE 2x10    S/L ER eccentric       1x15                   Neuro Re-Ed                                                                                                        Ther Ex             Pulleys 5' 6' 6' 5' 6' 6' 6' 6'  6'    Table slides X15 flex+abd 3x10 ea 3x10  3x10  Wall slids 2x5 Wall slides x13 Wall slides 2x10 Wall slides 3x10 3x10     Scap retractions  3x10 2x10 3x10 3x10 3x10   3x10 3x10    Deltoid isometrics (flex/abd/ext)  10\"x10  10\"x10 10\"x10 10\"x10 10\"x10 10\"x10 10\"x10 10\"x10    Bicep/tricep PRE's  nv nv          AAROM flex w/ cane  3x10 3x10  3x10  3x10 3x10 3x10 3x10 3x10    Corner stretch    30\"x3 30\"x3   30\"x3 30\"x3 30\"x3    Single arm supine flex   2x10 min A 2x10 min A 3x10 min A 3x10 min A 3x10 min A 3x10  min A 3x10 min A    Rows/ext w/ TB     2x10 GTB 3x10 " GTB 3x10 BTB 3x10 BTB 3x10 BTB    No moneys         Attempted unable    Ther Activity                                       Gait Training                                       Modalities

## 2023-12-28 ENCOUNTER — HOSPITAL ENCOUNTER (OUTPATIENT)
Dept: NON INVASIVE DIAGNOSTICS | Facility: HOSPITAL | Age: 83
Discharge: HOME | End: 2023-12-28
Payer: COMMERCIAL

## 2023-12-28 DIAGNOSIS — I48.0 PAROXYSMAL ATRIAL FIBRILLATION (HCC): ICD-10-CM

## 2023-12-28 PROCEDURE — 93227 XTRNL ECG REC<48 HR R&I: CPT | Performed by: INTERNAL MEDICINE

## 2023-12-28 PROCEDURE — 93226 XTRNL ECG REC<48 HR SCAN A/R: CPT

## 2023-12-28 PROCEDURE — 93225 XTRNL ECG REC<48 HRS REC: CPT

## 2023-12-29 ENCOUNTER — OFFICE VISIT (OUTPATIENT)
Dept: PHYSICAL THERAPY | Age: 83
End: 2023-12-29
Payer: COMMERCIAL

## 2023-12-29 DIAGNOSIS — M25.511 ACUTE PAIN OF RIGHT SHOULDER: ICD-10-CM

## 2023-12-29 DIAGNOSIS — M67.911 ROTATOR CUFF DYSFUNCTION, RIGHT: Primary | ICD-10-CM

## 2023-12-29 PROCEDURE — 97140 MANUAL THERAPY 1/> REGIONS: CPT

## 2023-12-29 PROCEDURE — 97110 THERAPEUTIC EXERCISES: CPT

## 2023-12-29 NOTE — PROGRESS NOTES
"Daily Note     Today's date: 2023  Patient name: Peg Garrett  : 1940  MRN: [id number]  Referring provider: Arsen Devine*  Dx:   Encounter Diagnosis     ICD-10-CM    1. Rotator cuff dysfunction, right  M67.911       2. Acute pain of right shoulder  M25.511                      Subjective: Pt reports she cleaned out her entire fridge yesterday which gave her R shoulder a workout. She was able to wash her hair by herself today.       Objective: See treatment diary below      Assessment: Pt with improving strength in R shoulder as she was able to perform shoulder flexion in supine without therapist assistnace. Able to add 1 lb weight to cane for shoulder flexion and chest press in supine. Continues to have significant weakness with ER. PROM slightly limited into ER in 90/90 position but able to achieve full ER after subsequent repetitions. Tolerated treatment well. Patient demonstrated fatigue post treatment, exhibited good technique with therapeutic exercises, and would benefit from continued PT      Plan: Continue per plan of care.  Progress treatment as tolerated.         Precautions: R shoulder RTC tear      Manuals 11/15 11/28 12/4 12/6 12/11 12/13 12/20 12/22 12/27 12/29   R shoulder PROM  NR NR NR NR NR NR NA NR NR   Stabilization MRE's       90/90 ER MRE 2x10 90/90 ER MRE 2x10 90/90 MRE 2x10 nv   S/L ER eccentric       1x15                   Neuro Re-Ed                                                                                                        Ther Ex             UBE          3'/3'   Pulleys 5' 6' 6' 5' 6' 6' 6' 6'  6'    Table slides X15 flex+abd 3x10 ea 3x10  3x10  Wall slids 2x5 Wall slides x13 Wall slides 2x10 Wall slides 3x10 3x10  3x10    Scap retractions  3x10 2x10 3x10 3x10 3x10   3x10 3x10    Deltoid isometrics (flex/abd/ext)  10\"x10  10\"x10 10\"x10 10\"x10 10\"x10 10\"x10 10\"x10 10\"x10 10\"x10   Bicep/tricep PRE's  nv nv          AAROM flex w/ cane  3x10 3x10  3x10  " "3x10 3x10 3x10 3x10 3x10 3x10 1#   Corner stretch    30\"x3 30\"x3   30\"x3 30\"x3 30\"x3 30\"x3   Single arm supine flex   2x10 min A 2x10 min A 3x10 min A 3x10 min A 3x10 min A 3x10  min A 3x10 min A 3x10    Rows/ext w/ TB     2x10 GTB 3x10 GTB 3x10 BTB 3x10 BTB 3x10 BTB 3x10 BTB   No moneys         Attempted unable    Standing I's, Y's, T's             Ther Activity             Lifting from shoulder height                          Gait Training                                       Modalities                                                            "

## 2024-01-03 ENCOUNTER — EVALUATION (OUTPATIENT)
Dept: PHYSICAL THERAPY | Age: 84
End: 2024-01-03
Payer: COMMERCIAL

## 2024-01-03 DIAGNOSIS — M67.911 ROTATOR CUFF DYSFUNCTION, RIGHT: Primary | ICD-10-CM

## 2024-01-03 DIAGNOSIS — M25.511 ACUTE PAIN OF RIGHT SHOULDER: ICD-10-CM

## 2024-01-03 PROCEDURE — 97164 PT RE-EVAL EST PLAN CARE: CPT

## 2024-01-03 PROCEDURE — 97140 MANUAL THERAPY 1/> REGIONS: CPT

## 2024-01-03 PROCEDURE — 97110 THERAPEUTIC EXERCISES: CPT

## 2024-01-03 NOTE — PROGRESS NOTES
PT Re-Evaluation     Today's date: 1/3/2024  Patient name: Peg Garrett  : 1940  MRN: [id number]  Referring provider: Asren Devine*  Dx:   Encounter Diagnosis     ICD-10-CM    1. Rotator cuff dysfunction, right  M67.911       2. Acute pain of right shoulder  M25.511                        Assessment  Assessment details: Pt is an 83 y.o. female who presents to re-evaluation with chief c/o R shoulder weakness s/p R RTC tear. She has completed 11 OP PT visits with benefit. She improved R shoulder PROM, is improving R shoulder AROM, her overall pain score is reduced, and she has improved on FOTO outcome measure. Although she has made objective improvements, she continues to have R shoulder weakness (especially with R shoulder external rotation and abduction) that is limiting her function. She has difficulty performing self-grooming tasks, difficulty lifting objects above shoulder height, and difficulty donning/doffing jackets. She would benefit from skilled OP PT in order to improve upon impairments, return to PLOF, and achieve all goals.   Impairments: abnormal or restricted ROM, impaired physical strength, lacks appropriate home exercise program, pain with function and poor posture   Understanding of Dx/Px/POC: good   Prognosis: good    Goals  STG  Pt will be able to reach overhead with a 50% reduction in symptoms within 2-3 weeks - MET  Pt will be able perform grooming ADL's with a 50% reduction in symptoms within 2-3 weeks - MET    LTG  Pt will be independent with HEP by d/c - PROGRESSING  Pt will improve FOTO by >/= expected by d/c - PROGRESSING  Pt will be able to tolerate normal leisure/recreational activities by d/c - PROGRESSING  Pt will be able to lift 8-10 lb container to overhead cabinet by d/c - PROGRESSING        Plan  Planned therapy interventions: patient education, therapeutic exercise, graded exercise, functional ROM exercises, flexibility, home exercise program, manual therapy,  activity modification, strengthening, stretching, joint mobilization, massage and therapeutic activities  Frequency: 2x week  Duration in weeks: 6  Treatment plan discussed with: patient      Subjective Evaluation    History of Present Illness  Mechanism of injury: , had a fall, She reached back for her chair with her R arm, the chair gave out and she fell on her R side. She experienced pain immediately following the fall. Pt reports that her R shoulder has loosed up a lot. She has a hard time brushing her teeth, doing her hair. She can use her elbow but she can't do anything straight out in front of her. Pt has pain when trying to lift straight forward. She can move her arm behind her and out to the side a little bit. Pt reports a sharp pain and then it aches. She takes tylenol and puts salonpas for pain relief. She slept for two weeks on the couch. She is now sleeping in her bed. She can now lay on the R side. Pain sometimes does wake her up at night but she has been taking a tylenol PM. Pt does notice some tingling in her hand but it is intermittent but especially when she is driving and just her fingers but it can be different ones.     Pt does note that she was having very mild discomfort in her R shoulder prior to this injury     Re-evaluation: Pt reports a 70% improvement since starting therapy. She is able to curl her hair (a little difficulty at the back of her hair), she is now able to put on her bra, and she is fine reaching across her body. She still has difficulty lifting heavy items, lifting from above shoulder height, reaching to her right or back to reach her coat or seatbelt.           Not a recurrent problem   Quality of life: excellent    Patient Goals  Patient goals for therapy: return to sport/leisure activities, independence with ADLs/IADLs, increased strength, decreased pain and increased motion    Pain  Current pain ratin  At best pain ratin  At worst pain rating:  "3  Quality: sharp and dull ache  Aggravating factors: overhead activity  Progression: no change    Social Support  Lives with: spouse    Hand dominance: right      Diagnostic Tests  X-ray: normal  Treatments  Current treatment: physical therapy        Objective     Active Range of Motion   Left Shoulder   Normal active range of motion    Right Shoulder   Flexion: 140 degrees with pain  Abduction: 108 degrees with pain  External rotation 0°: 8 degrees   External rotation 45°: 30 degrees with pain    Additional Active Range of Motion Details  IR BTB (cm from occ protuberance to thumb)  R: 30 cm  L: 28 cm       Passive Range of Motion     Right Shoulder   Flexion: 160 degrees with pain  Abduction: 115 degrees   External rotation 90°: 60 degrees with pain  Internal rotation 90°: WFL    Strength/Myotome Testing     Right Shoulder     Planes of Motion   Flexion: 3   Abduction: 3-   External rotation at 0°: 2   Internal rotation at 0°: 4-     Tests     Right Shoulder   Positive drop arm and external rotation lag sign.   Negative belly press.            Precautions: R shoulder RTC tear        Manuals 1/3      12/20 12/22 12/27 12/29   R shoulder PROM NR      NR NA NR NR   Stabilization MRE's 90/90 5\"x15      90/90 ER MRE 2x10 90/90 ER MRE 2x10 90/90 MRE 2x10 nv   S/L ER eccentric       1x15                   Neuro Re-Ed                                                                                                        Ther Ex             UBE 3'/3'         3'/3'   Pulleys       6' 6'  6'    Wall slides 1x10 single arm; 2x10 both arms      Wall slides 2x10 Wall slides 3x10 3x10  3x10    Scap retractions        3x10 3x10    Deltoid isometrics (flex/abd/ext) 10\"x10 ea      10\"x10 10\"x10 10\"x10 10\"x10   Bicep/tricep PRE's 3x10 2#            AAROM flex w/ cane 3x10 1.5#      3x10 3x10 3x10 3x10 1#   Corner stretch  30\"x3      30\"x3 30\"x3 30\"x3 30\"x3   Single arm supine flex 3x10 1#      3x10 min A 3x10  min A 3x10 min A 3x10  "   Rows/ext w/ TB 3x10 BTB      3x10 BTB 3x10 BTB 3x10 BTB 3x10 BTB   No moneys         Attempted unable    Standing I's, Y's, T's 2x10 ea            Ther Activity             Lifting from shoulder height NV*                         Gait Training                                       Modalities

## 2024-01-05 ENCOUNTER — OFFICE VISIT (OUTPATIENT)
Dept: PHYSICAL THERAPY | Age: 84
End: 2024-01-05
Payer: COMMERCIAL

## 2024-01-05 DIAGNOSIS — M67.911 ROTATOR CUFF DYSFUNCTION, RIGHT: Primary | ICD-10-CM

## 2024-01-05 DIAGNOSIS — M25.511 ACUTE PAIN OF RIGHT SHOULDER: ICD-10-CM

## 2024-01-05 PROCEDURE — 97140 MANUAL THERAPY 1/> REGIONS: CPT

## 2024-01-05 PROCEDURE — 97110 THERAPEUTIC EXERCISES: CPT

## 2024-01-05 NOTE — PROGRESS NOTES
"Daily Note     Today's date: 2024  Patient name: Peg Garrett  : 1940  MRN: [id number]  Referring provider: Arsen Devine*  Dx:   Encounter Diagnosis     ICD-10-CM    1. Rotator cuff dysfunction, right  M67.911       2. Acute pain of right shoulder  M25.511                      Subjective: Pt offers no new complaints      Objective: See treatment diary below      Assessment: Pt continues to exhibit external rotation weakness. Improving flexion strength in supine but fatigues quickly in standing. She would benefit from continued OP PT in order to improve R shoulder strength and achieve all goals. Tolerated treatment well. Patient demonstrated fatigue post treatment, exhibited good technique with therapeutic exercises, and would benefit from continued PT      Plan: Continue per plan of care.  Progress treatment as tolerated.       Precautions: R shoulder RTC tear        Manuals 1/3 1/5     12/20 12/22 12/27 12/29   R shoulder PROM NR NR     NR NA NR NR   Stabilization MRE's 90/90 5\"x15 nv     90/90 ER MRE 2x10 90/90 ER MRE 2x10 90/90 MRE 2x10 nv   S/L ER eccentric       1x15                   Neuro Re-Ed                                                                                                        Ther Ex             UBE 3'/3' 3'/3'        3'/3'   Pulleys       6' 6'  6'    Wall slides 1x10 single arm; 2x10 both arms 1x10 single arm; 2x10 both arms     Wall slides 2x10 Wall slides 3x10 3x10  3x10    Scap retractions        3x10 3x10    Deltoid isometrics (flex/abd/ext) 10\"x10 ea 10\"x10     10\"x10 10\"x10 10\"x10 10\"x10   Bicep/tricep PRE's 3x10 2# 3x10 2#           AAROM flex w/ cane 3x10 1.5# 3x10 1.5#     3x10 3x10 3x10 3x10 1#   Corner stretch  30\"x3      30\"x3 30\"x3 30\"x3 30\"x3   Single arm supine flex 3x10 1# 3x10 1#     3x10 min A 3x10  min A 3x10 min A 3x10    Rows/ext w/ TB 3x10 BTB 3x10 BTB     3x10 BTB 3x10 BTB 3x10 BTB 3x10 BTB   No moneys         Attempted unable    Standing " I's, Y's, T's 2x10 ea 2x10 ea           Ther Activity             Lifting from shoulder height NV* NV*                        Gait Training                                       Modalities

## 2024-01-08 ENCOUNTER — OFFICE VISIT (OUTPATIENT)
Dept: OBGYN CLINIC | Facility: OTHER | Age: 84
End: 2024-01-08
Payer: COMMERCIAL

## 2024-01-08 VITALS
WEIGHT: 188 LBS | SYSTOLIC BLOOD PRESSURE: 125 MMHG | DIASTOLIC BLOOD PRESSURE: 74 MMHG | BODY MASS INDEX: 30.22 KG/M2 | HEIGHT: 66 IN | HEART RATE: 59 BPM

## 2024-01-08 DIAGNOSIS — M67.911 ROTATOR CUFF DYSFUNCTION, RIGHT: Primary | ICD-10-CM

## 2024-01-08 PROCEDURE — 99214 OFFICE O/P EST MOD 30 MIN: CPT | Performed by: PHYSICIAN ASSISTANT

## 2024-01-08 NOTE — PROGRESS NOTES
"  Assessment  Diagnoses and all orders for this visit:    Rotator cuff dysfunction, right  -     MRI shoulder right wo contrast; Future      Discussion and Plan:    Peg's exam is concerning for rotator cuff tear.  She continues to be limited despite conservative treatment.  MRI of the right shoulder will be ordered.  We will see her back in the office after MRI to discuss results and treatment options.  Peg will continue with HEP as instructed by therapist.    Follow up: after MRI    Subjective:   Patient ID: Peg Garrett is a 83 y.o. female      Peg presents to the office in follow up of her right shoulder rotator cuff dysfunction.  She has been compliant with formal PT.  She reports some improvement in right shoulder complaints (motion has improved).  She has pain with overhead activities - cannot get anything of weight out of microwave.  She has difficulty curling her hair on the right side.  Pain improves with rest.  Nocturnal complaints are improving.  Denies paresthesias.    Injury: October she fell on the beach at [place].  She was able to use the right shoulder for all ADLs and activities of enjoyment without pain or limitation.      The following portions of the patient's history were reviewed and updated as appropriate: allergies, current medications, past family history, past medical history, past social history, past surgical history and problem list.    Review of Systems   Constitutional:  Negative for chills and fever.   HENT:  Negative for hearing loss.    Eyes:  Negative for visual disturbance.   Respiratory:  Negative for shortness of breath.    Cardiovascular:  Negative for chest pain.   Gastrointestinal:  Negative for abdominal pain.   Musculoskeletal:         As reviewed in the HPI   Skin:  Negative for rash.   Neurological:         As reviewed in the HPI   Psychiatric/Behavioral:  Negative for agitation.        Objective:  Ht 5' 5.5\" (1.664 m) Comment: verbal  Wt 85.3 kg (188 lb) " Comment: verbal- patient refused to be weighed  BMI 30.81 kg/m²       Right Shoulder Exam     Tenderness   The patient is experiencing no tenderness.    Range of Motion   The patient has normal right shoulder ROM.    Muscle Strength   External rotation: 2/5   Supraspinatus: 4/5     Tests   Simms test: negative  Impingement: negative  Drop arm: positive    Other   Erythema: absent  Sensation: normal  Pulse: present    Comments:  ER lag sign            Physical Exam  Constitutional:       General: She is not in acute distress.     Appearance: She is well-developed.   HENT:      Head: Normocephalic.   Pulmonary:      Breath sounds: No stridor. No wheezing.   Musculoskeletal:      Cervical back: Normal range of motion.   Skin:     General: Skin is warm and dry.   Neurological:      Mental Status: She is alert and oriented to person, place, and time.   Psychiatric:         Behavior: Behavior normal.         Thought Content: Thought content normal.         Judgment: Judgment normal.

## 2024-01-09 ENCOUNTER — OFFICE VISIT (OUTPATIENT)
Dept: PHYSICAL THERAPY | Age: 84
End: 2024-01-09
Payer: COMMERCIAL

## 2024-01-09 DIAGNOSIS — M67.911 ROTATOR CUFF DYSFUNCTION, RIGHT: Primary | ICD-10-CM

## 2024-01-09 DIAGNOSIS — M25.511 ACUTE PAIN OF RIGHT SHOULDER: ICD-10-CM

## 2024-01-09 PROCEDURE — 97110 THERAPEUTIC EXERCISES: CPT

## 2024-01-09 PROCEDURE — 97140 MANUAL THERAPY 1/> REGIONS: CPT

## 2024-01-09 NOTE — PROGRESS NOTES
"Daily Note     Today's date: 2024  Patient name: Peg Garrett  : 1940  MRN: [id number]  Referring provider: Arsen Devine*  Dx:   Encounter Diagnosis     ICD-10-CM    1. Rotator cuff dysfunction, right  M67.911       2. Acute pain of right shoulder  M25.511                      Subjective: Pt reports her visit with ortho went well. They are suggesting she get an MRI but she wants to see how much it costs. She feels like she is doing well with PT.       Objective: See treatment diary below      Assessment: Pt slowly improving standing shoulder AROM but conitnues to be limited 2* underlying rotator cuff weakness. Incorporate very light lifting from shelf above shoulder height. Pt was able to complete this activity without symptoms. She would benefit from continued OP PT in order to progress strength and improve ROM.  Tolerated treatment well. Patient demonstrated fatigue post treatment, exhibited good technique with therapeutic exercises, and would benefit from continued PT      Plan: Continue per plan of care.  Progress treatment as tolerated.       Precautions: R shoulder RTC tear        Manuals 1/3 1/5 1/9    12/20 12/22 12/27 12/29   R shoulder PROM NR NR NR    NR NA NR NR   Stabilization MRE's 90/90 5\"x15 nv nv    90/90 ER MRE 2x10 90/90 ER MRE 2x10 90/90 MRE 2x10 nv   S/L ER eccentric       1x15                   Neuro Re-Ed                                                                                                        Ther Ex             UBE 3'/3' 3'/3' 3'/3'       3'/3'   Pulleys       6' 6'  6'    Wall slides 1x10 single arm; 2x10 both arms 1x10 single arm; 2x10 both arms 3x10 both arms    Wall slides 2x10 Wall slides 3x10 3x10  3x10    Scap retractions        3x10 3x10    Deltoid isometrics (flex/abd/ext) 10\"x10 ea 10\"x10 10\"x10    10\"x10 10\"x10 10\"x10 10\"x10   Bicep/tricep PRE's 3x10 2# 3x10 2# 3x10 2#          AAROM flex w/ cane 3x10 1.5# 3x10 1.5# 3x10 1.5#    3x10 3x10 3x10 " "3x10 1#   Corner stretch  30\"x3  30\"x3    30\"x3 30\"x3 30\"x3 30\"x3   Single arm supine flex 3x10 1# 3x10 1# 3x10 1#    3x10 min A 3x10  min A 3x10 min A 3x10    Rows/ext w/ TB 3x10 BTB 3x10 BTB 3x10 BTB    3x10 BTB 3x10 BTB 3x10 BTB 3x10 BTB   No moneys         Attempted unable    Standing I's, Y's, T's 2x10 ea 2x10 ea 2x10 ea          Ther Activity             Lifting from shoulder height NV* NV* 3x10 1#                       Gait Training                                       Modalities                                                    "

## 2024-01-11 ENCOUNTER — OFFICE VISIT (OUTPATIENT)
Dept: PHYSICAL THERAPY | Age: 84
End: 2024-01-11
Payer: COMMERCIAL

## 2024-01-11 DIAGNOSIS — M67.911 ROTATOR CUFF DYSFUNCTION, RIGHT: Primary | ICD-10-CM

## 2024-01-11 DIAGNOSIS — M25.511 ACUTE PAIN OF RIGHT SHOULDER: ICD-10-CM

## 2024-01-11 PROCEDURE — 97140 MANUAL THERAPY 1/> REGIONS: CPT

## 2024-01-11 PROCEDURE — 97110 THERAPEUTIC EXERCISES: CPT

## 2024-01-11 NOTE — PROGRESS NOTES
"Daily Note     Today's date: 2024  Patient name: Peg Garrett  : 1940  MRN: [id number]  Referring provider: Arsen Devine*  Dx: No diagnosis found.               Subjective: Pt reports she is having less pain in her shoulder. She was able to comb most of her hair without pain this morning.       Objective: See treatment diary below      Assessment: Pt with improving supine shoulder flexion strength. Challenged by lifting above shoulder height with light weight. External rotation weakness remains. Tolerated treatment well. Patient demonstrated fatigue post treatment, exhibited good technique with therapeutic exercises, and would benefit from continued PT      Plan: Continue per plan of care.  Progress treatment as tolerated.       Precautions: R shoulder RTC tear        Manuals 1/3 1/5 1/9 1/11   12/20 12/22 12/27 12/29   R shoulder PROM NR NR NR NR   NR NA NR NR   Stabilization MRE's 90/90 5\"x15 nv nv    90/90 ER MRE 2x10 90/90 ER MRE 2x10 90/90 MRE 2x10 nv   S/L ER eccentric       1x15                   Neuro Re-Ed                                                                                                        Ther Ex             UBE 3'/3' 3'/3' 3'/3' 3'/3'      3'/3'   Pulleys       6' 6'  6'    Wall slides 1x10 single arm; 2x10 both arms 1x10 single arm; 2x10 both arms 3x10 both arms 3x10 both arms   Wall slides 2x10 Wall slides 3x10 3x10  3x10    Scap retractions        3x10 3x10    Deltoid isometrics (flex/abd/ext) 10\"x10 ea 10\"x10 10\"x10 10\"x10   10\"x10 10\"x10 10\"x10 10\"x10   Bicep/tricep PRE's 3x10 2# 3x10 2# 3x10 2# 3x10 2#         AAROM flex w/ cane 3x10 1.5# 3x10 1.5# 3x10 1.5# 3x10 2#   3x10 3x10 3x10 3x10 1#   Corner stretch  30\"x3  30\"x3    30\"x3 30\"x3 30\"x3 30\"x3   Single arm supine flex 3x10 1# 3x10 1# 3x10 1# 3x10 1# (increase NV)   3x10 min A 3x10  min A 3x10 min A 3x10    Rows/ext w/ TB 3x10 BTB 3x10 BTB 3x10 BTB 3x10 BTB   3x10 BTB 3x10 BTB 3x10 BTB 3x10 BTB   No " moneys         Attempted unable    Standing I's, Y's, T's 2x10 ea 2x10 ea 2x10 ea 2x10 ea         Ther Activity             Lifting from shoulder height NV* NV* 3x10 1# 3x10 1#                      Gait Training                                       Modalities

## 2024-01-15 ENCOUNTER — OFFICE VISIT (OUTPATIENT)
Dept: PHYSICAL THERAPY | Age: 84
End: 2024-01-15
Payer: COMMERCIAL

## 2024-01-15 DIAGNOSIS — M25.511 ACUTE PAIN OF RIGHT SHOULDER: ICD-10-CM

## 2024-01-15 DIAGNOSIS — M67.911 ROTATOR CUFF DYSFUNCTION, RIGHT: Primary | ICD-10-CM

## 2024-01-15 PROCEDURE — 97140 MANUAL THERAPY 1/> REGIONS: CPT

## 2024-01-15 PROCEDURE — 97110 THERAPEUTIC EXERCISES: CPT

## 2024-01-15 NOTE — PROGRESS NOTES
"Daily Note     Today's date: 1/15/2024  Patient name: Peg Garrett  : 1940  MRN: [id number]  Referring provider: Arsen Devine*  Dx: No diagnosis found.               Subjective: Pt offers no new complaints      Objective: See treatment diary below      Assessment: Pt is progressing with strengthening exercises as she was able to tolerate 1 lb increases for most supine exercises. Compensatory movements patterns still noted during standing lifting above shoulder height and with AROM 2* underlying RTC tear. She will be having an MRI on 23 on R shoulder. Tolerated treatment well. Patient demonstrated fatigue post treatment, exhibited good technique with therapeutic exercises, and would benefit from continued PT      Plan: Continue per plan of care.  Progress treatment as tolerated.       Precautions: R shoulder RTC tear        Manuals 1/3 1/5 1/9 1/11 1/15  12/20 12/22 12/27 12/29   R shoulder PROM NR NR NR NR NR  NR NA NR NR   Stabilization MRE's 90/90 5\"x15 nv nv    90/90 ER MRE 2x10 90/90 ER MRE 2x10 90/90 MRE 2x10 nv   S/L ER eccentric       1x15                   Neuro Re-Ed                                                                                                        Ther Ex             UBE 3'/3' 3'/3' 3'/3' 3'/3' 3'/3'     3'/3'   Pulleys       6' 6'  6'    Wall slides 1x10 single arm; 2x10 both arms 1x10 single arm; 2x10 both arms 3x10 both arms 3x10 both arms 3x10  Wall slides 2x10 Wall slides 3x10 3x10  3x10    Scap retractions        3x10 3x10    Deltoid isometrics (flex/abd/ext) 10\"x10 ea 10\"x10 10\"x10 10\"x10 10\"x10  10\"x10 10\"x10 10\"x10 10\"x10   Bicep/tricep PRE's 3x10 2# 3x10 2# 3x10 2# 3x10 2# 3x10 3#        AAROM flex w/ cane 3x10 1.5# 3x10 1.5# 3x10 1.5# 3x10 2# 3x10 3#  3x10 3x10 3x10 3x10 1#   Corner stretch  30\"x3  30\"x3    30\"x3 30\"x3 30\"x3 30\"x3   Single arm supine flex 3x10 1# 3x10 1# 3x10 1# 3x10 1# (increase NV) 3x10 2#  3x10 min A 3x10  min A 3x10 min A 3x10  "   Rows/ext w/ TB 3x10 BTB 3x10 BTB 3x10 BTB 3x10 BTB 3x10 BTB  3x10 BTB 3x10 BTB 3x10 BTB 3x10 BTB   No moneys         Attempted unable    Standing I's, Y's, T's 2x10 ea 2x10 ea 2x10 ea 2x10 ea 2x10 ea        Ther Activity             Lifting from shoulder height NV* NV* 3x10 1# 3x10 1# 3x10 1#                     Gait Training                                       Modalities

## 2024-01-17 ENCOUNTER — HOSPITAL ENCOUNTER (OUTPATIENT)
Dept: RADIOLOGY | Age: 84
Discharge: HOME | End: 2024-01-17
Payer: COMMERCIAL

## 2024-01-17 ENCOUNTER — APPOINTMENT (OUTPATIENT)
Dept: PHYSICAL THERAPY | Age: 84
End: 2024-01-17
Payer: COMMERCIAL

## 2024-01-17 DIAGNOSIS — M67.911 ROTATOR CUFF DYSFUNCTION, RIGHT: ICD-10-CM

## 2024-01-17 PROCEDURE — G1004 CDSM NDSC: HCPCS

## 2024-01-17 PROCEDURE — 73221 MRI JOINT UPR EXTREM W/O DYE: CPT

## 2024-01-19 DIAGNOSIS — E03.9 HYPOTHYROIDISM, UNSPECIFIED TYPE: ICD-10-CM

## 2024-01-19 RX ORDER — LEVOTHYROXINE SODIUM 88 UG/1
88 TABLET ORAL DAILY
Qty: 30 TABLET | Refills: 5 | Status: SHIPPED | OUTPATIENT
Start: 2024-01-19 | End: 2024-01-19 | Stop reason: SDUPTHER

## 2024-01-19 RX ORDER — LEVOTHYROXINE SODIUM 88 UG/1
88 TABLET ORAL DAILY
Qty: 90 TABLET | Refills: 1 | Status: SHIPPED | OUTPATIENT
Start: 2024-01-19

## 2024-01-19 NOTE — TELEPHONE ENCOUNTER
Medication Refill Request     Name   levothyroxine 88 mcg tablet    Dose/Frequency 1 tablet daily  Quantity 90  Verified pharmacy   [x]  Verified ordering Provider   [x]  Does patient have enough for the next 3 days? Yes [] No [x]    Patient is requesting a 3 month supply

## 2024-01-25 ENCOUNTER — HOSPITAL ENCOUNTER (OUTPATIENT)
Dept: RADIOLOGY | Age: 84
Discharge: HOME | End: 2024-01-25
Payer: COMMERCIAL

## 2024-01-25 DIAGNOSIS — Z12.31 VISIT FOR SCREENING MAMMOGRAM: ICD-10-CM

## 2024-01-25 PROCEDURE — 77067 SCR MAMMO BI INCL CAD: CPT

## 2024-01-25 PROCEDURE — 77063 BREAST TOMOSYNTHESIS BI: CPT

## 2024-01-29 ENCOUNTER — APPOINTMENT (OUTPATIENT)
Dept: PHYSICAL THERAPY | Age: 84
End: 2024-01-29
Payer: COMMERCIAL

## 2024-01-30 NOTE — TELEPHONE ENCOUNTER
Reason for call:     Patient is asking is Dr Loo will fill her eye drop prescriptions usually filled by the eye doctor.   [x] Refill   [] Prior Auth  [] Other:     Office:   [x] PCP/Provider - Dr Loo  [] Specialty/Provider -     Medication:   Xiidra eye drop5% sol      Pharmacy: [facility] [place], [place]    Does the patient have enough for 3 days?   [x] Yes   [] No - Send as HP to POD

## 2024-01-31 ENCOUNTER — APPOINTMENT (OUTPATIENT)
Dept: PHYSICAL THERAPY | Age: 84
End: 2024-01-31
Payer: COMMERCIAL

## 2024-01-31 RX ORDER — LIFITEGRAST 50 MG/ML
1 SOLUTION/ DROPS OPHTHALMIC 2 TIMES DAILY
Qty: 5 EACH | Refills: 3 | OUTPATIENT
Start: 2024-01-31

## 2024-02-01 ENCOUNTER — OFFICE VISIT (OUTPATIENT)
Dept: OBGYN CLINIC | Facility: OTHER | Age: 84
End: 2024-02-01
Payer: COMMERCIAL

## 2024-02-01 VITALS
WEIGHT: 188 LBS | HEART RATE: 58 BPM | HEIGHT: 66 IN | DIASTOLIC BLOOD PRESSURE: 73 MMHG | BODY MASS INDEX: 30.22 KG/M2 | SYSTOLIC BLOOD PRESSURE: 137 MMHG

## 2024-02-01 DIAGNOSIS — M12.811 ROTATOR CUFF ARTHROPATHY OF RIGHT SHOULDER: Primary | ICD-10-CM

## 2024-02-01 PROCEDURE — 99214 OFFICE O/P EST MOD 30 MIN: CPT | Performed by: ORTHOPAEDIC SURGERY

## 2024-02-01 NOTE — PROGRESS NOTES
I personally examined the patient and reviewed the history provided.  I agree with the note and the assessment and plan by Dr. Zoila Hammond MD.     Assessment:    Rotator cuff arthropathy right shoulder    Plan:    As below we reviewed with the patient that her imaging is consistent with a chronic, nonrepairable rotator cuff tear which undoubtedly was present prior to her recent fall.  I see no evidence of an acute injury that would require acute intervention and already the patient is improving her symptoms significantly.  I recommend she continue to rehabilitate her shoulder and no surgical intervention is indicated at this time.  It is my belief she exacerbated her shoulder symptoms by her recent fall and she lost compensation, she does appear to be regaining compensation of her shoulder musculature and hopefully she is successful and able to function without pain.  If she were to be painful or continues to be dysfunctional despite the physical therapy we can always try corticosteroid injection and/or surgical intervention which she is limited to reverse total shoulder arthroplasty at this time.          Assessment  Diagnoses and all orders for this visit:    Rotator cuff arthropathy of right shoulder  -     Ambulatory Referral to Physical Therapy; Future      Discussion and Plan:    Peg returns for MRI review. It was discussed that her supra and infraspinatus tears appear chronic in nature given the level of retraction and fatty atrophy seen on imaging. As such, they are not amenable to primary repair. Given her excellent range of motion on exam and minimal reports of pain, she is not indicated for reverse total shoulder arthroplasty, nor an injection at this time. Should she develop pain in the future, a discussion of potential surgical intervention can be had at that time. A prescription for continued PT was provided today.    Subjective:   Patient ID: Peg Garrett is a 83 y.o. female      Peg presents  "to the office in follow up of her right shoulder rotator cuff dysfunction. She has participated in formal PT and reports that the majority of her motion has improved. She does still have difficulty with curling her hair and removing items from her overhead microwave with her right hand. Denies pain, only some weakness in the right shoulder. MRI was obtained. She states that she is uninterested in surgery at this point.    Injury: October she fell on the beach at [place].  She was able to use the right shoulder for all ADLs and activities of enjoyment without pain or limitation.      The following portions of the patient's history were reviewed and updated as appropriate: allergies, current medications, past family history, past medical history, past social history, past surgical history and problem list.    Review of Systems   Constitutional:  Negative for chills and fever.   HENT:  Negative for hearing loss.    Eyes:  Negative for visual disturbance.   Respiratory:  Negative for shortness of breath.    Cardiovascular:  Negative for chest pain.   Gastrointestinal:  Negative for abdominal pain.   Musculoskeletal:         As reviewed in the HPI   Skin:  Negative for rash.   Neurological:         As reviewed in the HPI   Psychiatric/Behavioral:  Negative for agitation.        Objective:  /73 (BP Location: Left arm, Patient Position: Sitting, Cuff Size: Standard)   Pulse 58   Ht 5' 5.5\" (1.664 m)   Wt 85.3 kg (188 lb)   BMI 30.81 kg/m²       Right Shoulder Exam     Tenderness   The patient is experiencing no tenderness.    Range of Motion   Active abduction:  120   External rotation:  40   Forward flexion:  180+   Internal rotation 0 degrees:  Upperthoracic     Muscle Strength   Abduction: 4/5   Internal rotation: 4/5   External rotation: 3/5   Supraspinatus: 4/5     Tests   Simms test: negative  Impingement: negative  Drop arm: negative    Other   Erythema: absent  Sensation: normal  Pulse: " present    Comments:  ER lag sign            Physical Exam  Vitals reviewed.   Constitutional:       General: She is not in acute distress.     Appearance: She is well-developed.   HENT:      Head: Normocephalic and atraumatic.   Eyes:      Extraocular Movements: Extraocular movements intact.      Conjunctiva/sclera: Conjunctivae normal.      Pupils: Pupils are equal, round, and reactive to light.   Cardiovascular:      Rate and Rhythm: Normal rate.      Pulses: Normal pulses.   Pulmonary:      Effort: Pulmonary effort is normal.      Breath sounds: No stridor. No wheezing.   Musculoskeletal:      Cervical back: Normal range of motion.   Skin:     General: Skin is warm and dry.      Capillary Refill: Capillary refill takes less than 2 seconds.   Neurological:      Mental Status: She is alert and oriented to person, place, and time.   Psychiatric:         Mood and Affect: Mood normal.         Behavior: Behavior normal.         Thought Content: Thought content normal.         Judgment: Judgment normal.       Imaging  I have personally reviewed the pertinent images and my interpretation is as follows:  MRI R shoulder shows a chronic, retracted supraspinatus and infraspinatus tendon tear with moderate atrophy of both the supraspinatus and infraspinatus.

## 2024-02-01 NOTE — PATIENT INSTRUCTIONS
MRI was reviewed today. It shows complete infraspinatus and supraspinatus muscle tears that appear to be chronic in nature. These are not amenable to repair. Given your excellent mobility and limited pain, no need for surgical intervention at this time. No need for injection at this time. A new script for PT was provided. Should pain or limitation worsen in the future, please feel free to return for further discussion.

## 2024-02-05 ENCOUNTER — OFFICE VISIT (OUTPATIENT)
Dept: PHYSICAL THERAPY | Age: 84
End: 2024-02-05
Payer: COMMERCIAL

## 2024-02-05 DIAGNOSIS — M67.911 ROTATOR CUFF DYSFUNCTION, RIGHT: Primary | ICD-10-CM

## 2024-02-05 DIAGNOSIS — M25.511 ACUTE PAIN OF RIGHT SHOULDER: ICD-10-CM

## 2024-02-05 PROCEDURE — 97140 MANUAL THERAPY 1/> REGIONS: CPT

## 2024-02-05 PROCEDURE — 97110 THERAPEUTIC EXERCISES: CPT

## 2024-02-05 NOTE — PROGRESS NOTES
"Daily Note     Today's date: 2024  Patient name: Peg Garrett  : 1940  MRN: [id number]  Referring provider: Arsen Devine*  Dx:   Encounter Diagnosis     ICD-10-CM    1. Rotator cuff dysfunction, right  M67.911       2. Acute pain of right shoulder  M25.511                      Subjective: Pt reports she really doesn't have that much pain. She does notice that her R shoulder is still very weak. She has difficulty lifting above shoulder height and doing her hair for longer periods of time.       Objective: See treatment diary below      Assessment: Pt returns to OP PT following f/u with MD for progression of AROM and R shoulder strength. She was able to tolerate progression of supine shoulder flexion with dumbbell and exercise with cane. She continues to have difficulty with standing shoulder exercises 2* rotator cuff dysfunction. Tolerated treatment well. Patient demonstrated fatigue post treatment, exhibited good technique with therapeutic exercises, and would benefit from continued PT      Plan: Continue per plan of care.  Progress treatment as tolerated.       Precautions: R shoulder RTC tear        Manuals 1/3 1/5 1/9 1/11 1/15 2/5 12/20 12/22 12/27 12/29   R shoulder PROM NR NR NR NR NR NR NR NA NR NR   Stabilization MRE's 90/90 5\"x15 nv nv    90/90 ER MRE 2x10 90/90 ER MRE 2x10 90/90 MRE 2x10 nv   S/L ER eccentric       1x15                   Neuro Re-Ed                                                                                                        Ther Ex             UBE 3'/3' 3'/3' 3'/3' 3'/3' 3'/3' 4'/4'    3'/3'   Pulleys       6' 6'  6'    Wall slides 1x10 single arm; 2x10 both arms 1x10 single arm; 2x10 both arms 3x10 both arms 3x10 both arms 3x10 3x10 RUE Wall slides 2x10 Wall slides 3x10 3x10  3x10    Scap retractions        3x10 3x10    Deltoid isometrics (flex/abd/ext) 10\"x10 ea 10\"x10 10\"x10 10\"x10 10\"x10 10\"x10 ea 10\"x10 10\"x10 10\"x10 10\"x10   Bicep/tricep PRE's 3x10 " "2# 3x10 2# 3x10 2# 3x10 2# 3x10 3# 3x10 2#       AAROM flex w/ cane 3x10 1.5# 3x10 1.5# 3x10 1.5# 3x10 2# 3x10 3# 3x10 3# 3x10 3x10 3x10 3x10 1#   Corner stretch  30\"x3  30\"x3    30\"x3 30\"x3 30\"x3 30\"x3   Single arm supine flex 3x10 1# 3x10 1# 3x10 1# 3x10 1# (increase NV) 3x10 2# 3x10 3# 3x10 min A 3x10  min A 3x10 min A 3x10    Rows/ext w/ TB 3x10 BTB 3x10 BTB 3x10 BTB 3x10 BTB 3x10 BTB 3x10 BT 3x10 BTB 3x10 BTB 3x10 BTB 3x10 BTB   No moneys         Attempted unable    Standing I's, Y's, T's 2x10 ea 2x10 ea 2x10 ea 2x10 ea 2x10 ea 2x10 ea 1#       Ther Activity             Lifting from shoulder height NV* NV* 3x10 1# 3x10 1# 3x10 1# 3x10 2#                    Gait Training                                       Modalities                                                          "

## 2024-02-12 ENCOUNTER — OFFICE VISIT (OUTPATIENT)
Dept: PHYSICAL THERAPY | Age: 84
End: 2024-02-12
Payer: COMMERCIAL

## 2024-02-12 DIAGNOSIS — M25.511 ACUTE PAIN OF RIGHT SHOULDER: ICD-10-CM

## 2024-02-12 DIAGNOSIS — M67.911 ROTATOR CUFF DYSFUNCTION, RIGHT: Primary | ICD-10-CM

## 2024-02-12 PROCEDURE — 97140 MANUAL THERAPY 1/> REGIONS: CPT

## 2024-02-12 PROCEDURE — 97110 THERAPEUTIC EXERCISES: CPT

## 2024-02-12 NOTE — PROGRESS NOTES
"Daily Note     Today's date: 2024  Patient name: Peg Garrett  : 1940  MRN: [id number]  Referring provider: Arsen Devine*  Dx: No diagnosis found.               Subjective: Pt offers no new complaints       Objective: See treatment diary below      Assessment: Pt challenged by current strengthening exercises for R shoulder. Working on improving endurance with lower weight and high repetitions. Improved tolerance to wall slides with only using RUE. Tolerated treatment well. Patient demonstrated fatigue post treatment, exhibited good technique with therapeutic exercises, and would benefit from continued PT      Plan: Continue per plan of care.  Progress treatment as tolerated.       Precautions: R shoulder RTC tear        Manuals 1/3 1/5 1/9 1/11 1/15 2/5 2/12      R shoulder PROM NR NR NR NR NR NR NR      Stabilization MRE's 90/90 5\"x15 nv nv          S/L ER eccentric                          Neuro Re-Ed                                                                                                        Ther Ex             UBE 3'/3' 3'/3' 3'/3' 3'/3' 3'/3' 4'/4' 3'/3'      Pulleys             Wall slides 1x10 single arm; 2x10 both arms 1x10 single arm; 2x10 both arms 3x10 both arms 3x10 both arms 3x10 3x10 RUE 3x10 RUE      Scap retractions             Deltoid isometrics (flex/abd/ext) 10\"x10 ea 10\"x10 10\"x10 10\"x10 10\"x10 10\"x10 ea 10\"x10 ea      Bicep/tricep PRE's 3x10 2# 3x10 2# 3x10 2# 3x10 2# 3x10 3# 3x10 2#       AAROM flex w/ cane 3x10 1.5# 3x10 1.5# 3x10 1.5# 3x10 2# 3x10 3# 3x10 3# 3x10 3#      Corner stretch  30\"x3  30\"x3          Single arm supine flex 3x10 1# 3x10 1# 3x10 1# 3x10 1# (increase NV) 3x10 2# 3x10 3# 3x10 3#      Rows/ext w/ TB 3x10 BTB 3x10 BTB 3x10 BTB 3x10 BTB 3x10 BTB 3x10 BT 3x10 blue TB      No moneys             Standing I's, Y's, T's 2x10 ea 2x10 ea 2x10 ea 2x10 ea 2x10 ea 2x10 ea 1# 3x10 1#      Ther Activity             Lifting from shoulder height NV* NV* " 3x10 1# 3x10 1# 3x10 1# 3x10 2# 3x10 2#                   Gait Training                                       Modalities

## 2024-02-21 PROBLEM — Z13.820 SCREENING FOR OSTEOPOROSIS: Status: RESOLVED | Noted: 2019-11-12 | Resolved: 2024-02-21

## 2024-02-21 PROBLEM — Z13.1 SCREENING FOR DIABETES MELLITUS: Status: RESOLVED | Noted: 2019-11-12 | Resolved: 2024-02-21

## 2024-02-21 PROBLEM — Z00.00 MEDICARE ANNUAL WELLNESS VISIT, SUBSEQUENT: Status: RESOLVED | Noted: 2018-11-09 | Resolved: 2024-02-21

## 2024-03-22 DIAGNOSIS — Z00.6 ENCOUNTER FOR EXAMINATION FOR NORMAL COMPARISON OR CONTROL IN CLINICAL RESEARCH PROGRAM: ICD-10-CM

## 2024-05-06 ENCOUNTER — APPOINTMENT (OUTPATIENT)
Dept: LAB | Age: 84
End: 2024-05-06
Payer: COMMERCIAL

## 2024-05-06 DIAGNOSIS — R73.03 PREDIABETES: ICD-10-CM

## 2024-05-06 DIAGNOSIS — E03.9 HYPOTHYROIDISM, UNSPECIFIED TYPE: ICD-10-CM

## 2024-05-06 DIAGNOSIS — Z00.6 ENCOUNTER FOR EXAMINATION FOR NORMAL COMPARISON OR CONTROL IN CLINICAL RESEARCH PROGRAM: ICD-10-CM

## 2024-05-06 LAB
ALBUMIN SERPL BCP-MCNC: 4 G/DL (ref 3.5–5)
ALP SERPL-CCNC: 77 U/L (ref 34–104)
ALT SERPL W P-5'-P-CCNC: 18 U/L (ref 7–52)
ANION GAP SERPL CALCULATED.3IONS-SCNC: 9 MMOL/L (ref 4–13)
AST SERPL W P-5'-P-CCNC: 26 U/L (ref 13–39)
BILIRUB SERPL-MCNC: 0.45 MG/DL (ref 0.2–1)
BUN SERPL-MCNC: 14 MG/DL (ref 5–25)
CALCIUM SERPL-MCNC: 9 MG/DL (ref 8.4–10.2)
CHLORIDE SERPL-SCNC: 104 MMOL/L (ref 96–108)
CO2 SERPL-SCNC: 27 MMOL/L (ref 21–32)
CREAT SERPL-MCNC: 0.65 MG/DL (ref 0.6–1.3)
EST. AVERAGE GLUCOSE BLD GHB EST-MCNC: 117 MG/DL
GFR SERPL CREATININE-BSD FRML MDRD: 81 ML/MIN/1.73SQ M
GLUCOSE P FAST SERPL-MCNC: 105 MG/DL (ref 65–99)
HBA1C MFR BLD: 5.7 %
POTASSIUM SERPL-SCNC: 4.5 MMOL/L (ref 3.5–5.3)
PROT SERPL-MCNC: 6.8 G/DL (ref 6.4–8.4)
SODIUM SERPL-SCNC: 140 MMOL/L (ref 135–147)
TSH SERPL DL<=0.05 MIU/L-ACNC: 1.72 UIU/ML (ref 0.45–4.5)

## 2024-05-06 PROCEDURE — 80053 COMPREHEN METABOLIC PANEL: CPT

## 2024-05-06 PROCEDURE — 83036 HEMOGLOBIN GLYCOSYLATED A1C: CPT

## 2024-05-06 PROCEDURE — 84443 ASSAY THYROID STIM HORMONE: CPT

## 2024-05-06 PROCEDURE — 36415 COLL VENOUS BLD VENIPUNCTURE: CPT

## 2024-05-19 LAB
APOB+LDLR+PCSK9 GENE MUT ANL BLD/T: NOT DETECTED
BRCA1+BRCA2 DEL+DUP + FULL MUT ANL BLD/T: NOT DETECTED
MLH1+MSH2+MSH6+PMS2 GN DEL+DUP+FUL M: NOT DETECTED

## 2024-07-13 DIAGNOSIS — E03.9 HYPOTHYROIDISM, UNSPECIFIED TYPE: ICD-10-CM

## 2024-07-13 RX ORDER — LEVOTHYROXINE SODIUM 88 UG/1
88 TABLET ORAL DAILY
Qty: 90 TABLET | Refills: 1 | Status: SHIPPED | OUTPATIENT
Start: 2024-07-13

## 2024-09-16 ENCOUNTER — CONSULT (OUTPATIENT)
Dept: INTERNAL MEDICINE CLINIC | Facility: CLINIC | Age: 84
End: 2024-09-16
Payer: COMMERCIAL

## 2024-09-16 VITALS
HEIGHT: 65 IN | BODY MASS INDEX: 30.82 KG/M2 | RESPIRATION RATE: 20 BRPM | OXYGEN SATURATION: 98 % | WEIGHT: 185 LBS | SYSTOLIC BLOOD PRESSURE: 110 MMHG | HEART RATE: 84 BPM | DIASTOLIC BLOOD PRESSURE: 80 MMHG

## 2024-09-16 DIAGNOSIS — Z01.818 PREOP EXAMINATION: Primary | ICD-10-CM

## 2024-09-16 PROCEDURE — 99214 OFFICE O/P EST MOD 30 MIN: CPT
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
## 2024-09-16 NOTE — PROGRESS NOTES
INTERNAL MEDICINE PRE-OPERATIVE EVALUATION  [facility]   [facility]    NAME: Peg Garrett  AGE: 84 y.o. SEX: female  : 1940     DATE: 2024     Internal Medicine Pre-Operative Evaluation:     Chief Complaint: Pre-operative Evaluation     Surgery: Dr. Hernández  Anticipated Date of Surgery:   Referring Provider: No ref. provider found       Assessment/ Plan:     Patient is Medical Optimized for surgery without any additional testing.    Postop concerns: no    Problem List Items Addressed This Visit    None  Visit Diagnoses       Encounter for immunization    -  Primary            Pre-Surgery Instructions:   Medication Instructions    Acetaminophen 650 MG/20.3ML SOLN per anesthesia guidelines     Ascorbic Acid (VITAMIN C) 500 MG CAPS per anesthesia guidelines     aspirin 81 MG tablet Stop taking 7 days prior to surgery    Calcium Carb-Cholecalciferol (CALTRATE 600+D) 600-800 MG-UNIT TABS per anesthesia guidelines     Cholecalciferol (VITAMIN D-3) 1000 units CAPS per anesthesia guidelines     Glucosamine 750 MG TABS per anesthesia guidelines     levothyroxine 88 mcg tablet per anesthesia guidelines     Loratadine 10 MG CAPS per anesthesia guidelines     metoprolol tartrate (LOPRESSOR) 25 mg tablet per anesthesia guidelines     Multiple Vitamins-Minerals (CENTRUM SILVER ULTRA WOMENS) TABS per anesthesia guidelines     NON FORMULARY per anesthesia guidelines     Xiidra 5 % op solution per anesthesia guidelines          History of Present Illness:     Peg Garrett is a 84 y.o. female who presents to the office today for a preoperative consultation at the request of surgeon, who plans on performing . Planned anesthesia is local. Patient has a bleeding risk of: no recent abnormal bleeding. Patient does not have objections to receiving blood products if needed. Current anti-platelet/anti-coagulation medications that the patient is prescribed includes:  Aspirin.     Prior Anesthesia Reactions: No    Functional capacity: Walking , 4-5 MPH               4 Mets  Able to walk 4 blocks without symptoms?: Yes  Able to walk 2 flights without symptoms?: Yes  Pick the highest level patient can comfortably perform   4 mets or greater for surgery    Revised Cardiac Risk Index (RCRI) for Pre-Operative Risk   (estimates risk of cardiac complications after noncardiac surgery)    High-risk surgery: No = 0 point  Intraperitoneal, intrathoracic, suprainguinal vascular  History of ischemic heart disease: No = 0 point  Hx of MI, (+) exercise test, current chest pain considered due to myocardial ischemia, use of nitrate therapy or ECG with pathological Q waves)  History of CHF: No = 0 point  Pulmonary edema, B/L rales or S3 gallop; MONTERO, orthopnea, PND, CXR showing pulmonary vascular redistribution)  History of cerebrovascular disease: No = 0 point  Prior TIA or stroke  Pre-operative treatment with insulin: No = 0 point  Pre-operative creatinine >2 mg/dL: No = 0 point  Total RCRI SCORE: 0 0 points: Class I Risk, 3.9% 30-day risk of death, MI, or cardiac arrest    IESHA Risk:  GFR:        For PCP:  If GFR>60, Hold ACE/ARB/Diuretic on the day of surgery, and NSAIDS 10 days before.  If GFR<45, Consider PRE and POST op Nephrology Consult.  If 46 <GFR> 59 : Has Patient had IESHA in last 6 Months? no   If YES: Preop Nephrology consult   If No:  Post Op Nephrology consult.     Personal history of venous thromboembolic disease? No    History of steroid use for >2 weeks within last year? No    On Immunosuppressant meds/biologics: No    On any anticoagulations: No      Review of Systems:     Review of Systems   Constitutional:  Negative for chills and fever.   HENT:  Negative for ear pain and sore throat.    Eyes:  Negative for pain and visual disturbance.   Respiratory:  Negative for cough and shortness of breath.    Cardiovascular:  Negative for chest pain and palpitations.   Gastrointestinal:   Negative for abdominal pain and vomiting.   Genitourinary:  Negative for dysuria and hematuria.   Musculoskeletal:  Negative for arthralgias and back pain.   Skin:  Negative for color change and rash.   Neurological:  Negative for seizures and syncope.   All other systems reviewed and are negative.      CAD History: None   NOTE: Patient should see Cardiology if time available before surgery, and if appropriate.    Pulmonary History: Pneumonia    Renal history: None    Diabetes History:  None     Neurological History: None     Problem List:     Patient Active Problem List   Diagnosis    Atrial fibrillation (HCC)    Benign essential hypertension    Hypothyroidism    Preoperative clearance    Age-related cataract of both eyes    Preop examination    Age-related cataract    Essential hypertension    Meniere's disease    Oral candida    Oral ulcer    Needs flu shot    Snoring    Hematoma and contusion    Hematoma of left lower leg    Vitamin D deficiency    IFG (impaired fasting glucose)    PTSD (post-traumatic stress disorder)    Hip pain    Intentional tremor    Class 1 obesity due to excess calories with body mass index (BMI) of 30.0 to 30.9 in adult    Acute pain of right shoulder    Rotator cuff dysfunction, right    Rotator cuff arthropathy of right shoulder        Allergies:     Allergies   Allergen Reactions    Dust Mite Extract Allergic Rhinitis    Fluticasone-Salmeterol Hives     Category: Allergy;     Molds & Smuts Allergic Rhinitis    Morphine Nausea Only    Penicillins Hives     Category: Allergy;     Pollen Extract Allergic Rhinitis, Cough, Dermatitis and Itching    Latex Rash        Current Medications:       Current Outpatient Medications:     Acetaminophen 650 MG/20.3ML SOLN, Take 650 mg by mouth every 4 (four) hours as needed , Disp: , Rfl:     Ascorbic Acid (VITAMIN C) 500 MG CAPS, Take by mouth, Disp: , Rfl:     aspirin 81 MG tablet, Take 1 tablet by mouth daily, Disp: , Rfl:     Calcium  "Carb-Cholecalciferol (CALTRATE 600+D) 600-800 MG-UNIT TABS, Take by mouth, Disp: , Rfl:     Cholecalciferol (VITAMIN D-3) 1000 units CAPS, Take by mouth, Disp: , Rfl:     Glucosamine 750 MG TABS, Take by mouth, Disp: , Rfl:     levothyroxine 88 mcg tablet, TAKE ONE TABLET BY MOUTH EVERY DAY, Disp: 90 tablet, Rfl: 1    Loratadine 10 MG CAPS, Take by mouth daily, Disp: , Rfl:     metoprolol tartrate (LOPRESSOR) 25 mg tablet, TAKE ONE TABLET BY MOUTH EVERY 12 HOURS, Disp: 180 tablet, Rfl: 1    Multiple Vitamins-Minerals (CENTRUM SILVER ULTRA WOMENS) TABS, Take by mouth, Disp: , Rfl:     NON FORMULARY, Omega XL 4 tabs daily, Disp: , Rfl:     Xiidra 5 % op solution, Administer 1 drop to both eyes 2 (two) times a day, Disp: , Rfl:      Past History:     Past Medical History:   Diagnosis Date    Allergic     Arthritis     \"Everywhere\"OA    Atrial fibrillation (HCC)     Cancer (HCC) 2019    basil cell on nose and leg    Headache(784.0) 's    no longer have migraines    HL (hearing loss)     Hypokalemia     last assessed: 2015    Meniere's disease     Dx 40 yrs ago per pt.    Pneumonia approx.     Squamous cell skin cancer     (SCCIS)    Visual impairment 2019    taking drops        Past Surgical History:   Procedure Laterality Date    EYE SURGERY      cataracts removed    HYSTERECTOMY      partial  age 29        Family History   Problem Relation Age of Onset    Dementia Mother             Colon cancer Father 85            No Known Problems Sister     No Known Problems Daughter     No Known Problems Daughter     No Known Problems Maternal Grandmother     No Known Problems Maternal Grandfather     No Known Problems Paternal Grandmother     No Known Problems Paternal Grandfather     No Known Problems Maternal Aunt     No Known Problems Paternal Aunt     No Known Problems Paternal Aunt     No Known Problems Paternal Aunt     Brain cancer Half-Sister 40         at 48        Social " "History     Socioeconomic History    Marital status: /Civil Union     Spouse name: Not on file    Number of children: Not on file    Years of education: Not on file    Highest education level: Not on file   Occupational History    Not on file   Tobacco Use    Smoking status: Former     Current packs/day: 0.00     Types: Cigarettes     Quit date: 1973     Years since quittin.7    Smokeless tobacco: Never    Tobacco comments:     Never smoked when pregnant   Vaping Use    Vaping status: Never Used   Substance and Sexual Activity    Alcohol use: No     Comment: never drank alcohol    Drug use: No    Sexual activity: Not Currently   Other Topics Concern    Not on file   Social History Narrative    Not on file     Social Determinants of Health     Financial Resource Strain: Low Risk  (2023)    Overall Financial Resource Strain (CARDIA)     Difficulty of Paying Living Expenses: Not hard at all   Food Insecurity: Not on file   Transportation Needs: No Transportation Needs (2023)    PRAPARE - Transportation     Lack of Transportation (Medical): No     Lack of Transportation (Non-Medical): No   Physical Activity: Not on file   Stress: Not on file   Social Connections: Not on file   Intimate Partner Violence: Not on file   Housing Stability: Not on file        Physical Exam:      Vitals:    24 1455   BP: 110/80   BP Location: Left arm   Patient Position: Sitting   Cuff Size: Standard   Pulse: 84   Resp: 20   SpO2: 98%   Weight: 83.9 kg (185 lb)   Height: 5' 5\" (1.651 m)       Physical Exam  Vitals and nursing note reviewed.   Constitutional:       General: She is not in acute distress.     Appearance: She is well-developed.   HENT:      Head: Normocephalic and atraumatic.   Eyes:      Conjunctiva/sclera: Conjunctivae normal.      Comments: Right ptosis noted   Cardiovascular:      Rate and Rhythm: Normal rate and regular rhythm.      Heart sounds: No murmur heard.  Pulmonary:      Effort: " Pulmonary effort is normal. No respiratory distress.      Breath sounds: Normal breath sounds.   Abdominal:      Palpations: Abdomen is soft.      Tenderness: There is no abdominal tenderness.   Musculoskeletal:         General: No swelling.      Cervical back: Neck supple.   Skin:     General: Skin is warm and dry.      Capillary Refill: Capillary refill takes less than 2 seconds.   Neurological:      Mental Status: She is alert.   Psychiatric:         Mood and Affect: Mood normal.            Data:     Pre-operative work-up             Laboratory Results: I have personally reviewed the pertinent laboratory results/reports.    EKG/ Imaging: I have personally reviewed pertinent reports and I have personally reviewed pertinent films in PACS.        Mariam Lafleur MD  [facility] [address] [address]  Phone#  [phone]  Fax#  [phone]

## 2024-11-04 DIAGNOSIS — I10 ESSENTIAL HYPERTENSION: ICD-10-CM

## 2024-11-05 RX ORDER — METOPROLOL TARTRATE 25 MG/1
25 TABLET, FILM COATED ORAL EVERY 12 HOURS
Qty: 180 TABLET | Refills: 1 | Status: SHIPPED | OUTPATIENT
Start: 2024-11-05

## 2024-11-15 ENCOUNTER — TELEPHONE (OUTPATIENT)
Age: 84
End: 2024-11-15

## 2024-11-15 DIAGNOSIS — Z13.6 SCREENING FOR CARDIOVASCULAR CONDITION: ICD-10-CM

## 2024-11-15 DIAGNOSIS — E03.9 HYPOTHYROIDISM, UNSPECIFIED TYPE: ICD-10-CM

## 2024-11-15 DIAGNOSIS — R73.01 IFG (IMPAIRED FASTING GLUCOSE): Primary | ICD-10-CM

## 2024-11-15 NOTE — TELEPHONE ENCOUNTER
Pt 's  requested routine labs to be put in the system to be done before patient's  next appt on 11/22/24. Thank you.

## 2024-11-18 ENCOUNTER — APPOINTMENT (OUTPATIENT)
Dept: LAB | Age: 84
End: 2024-11-18
Payer: COMMERCIAL

## 2024-11-18 ENCOUNTER — RA CDI HCC (OUTPATIENT)
Dept: OTHER | Facility: HOSPITAL | Age: 84
End: 2024-11-18

## 2024-11-18 DIAGNOSIS — R73.01 IFG (IMPAIRED FASTING GLUCOSE): ICD-10-CM

## 2024-11-18 DIAGNOSIS — E03.9 HYPOTHYROIDISM, UNSPECIFIED TYPE: ICD-10-CM

## 2024-11-18 DIAGNOSIS — Z13.6 SCREENING FOR CARDIOVASCULAR CONDITION: ICD-10-CM

## 2024-11-18 LAB
ALBUMIN SERPL BCG-MCNC: 4 G/DL (ref 3.5–5)
ALP SERPL-CCNC: 81 U/L (ref 34–104)
ALT SERPL W P-5'-P-CCNC: 16 U/L (ref 7–52)
ANION GAP SERPL CALCULATED.3IONS-SCNC: 6 MMOL/L (ref 4–13)
AST SERPL W P-5'-P-CCNC: 24 U/L (ref 13–39)
BILIRUB SERPL-MCNC: 0.57 MG/DL (ref 0.2–1)
BUN SERPL-MCNC: 16 MG/DL (ref 5–25)
CALCIUM SERPL-MCNC: 9 MG/DL (ref 8.4–10.2)
CHLORIDE SERPL-SCNC: 104 MMOL/L (ref 96–108)
CHOLEST SERPL-MCNC: 172 MG/DL (ref ?–200)
CO2 SERPL-SCNC: 29 MMOL/L (ref 21–32)
CREAT SERPL-MCNC: 0.68 MG/DL (ref 0.6–1.3)
EST. AVERAGE GLUCOSE BLD GHB EST-MCNC: 117 MG/DL
GFR SERPL CREATININE-BSD FRML MDRD: 79 ML/MIN/1.73SQ M
GLUCOSE P FAST SERPL-MCNC: 90 MG/DL (ref 65–99)
HBA1C MFR BLD: 5.7 %
HDLC SERPL-MCNC: 65 MG/DL
LDLC SERPL CALC-MCNC: 93 MG/DL (ref 0–100)
POTASSIUM SERPL-SCNC: 4.3 MMOL/L (ref 3.5–5.3)
PROT SERPL-MCNC: 6.4 G/DL (ref 6.4–8.4)
SODIUM SERPL-SCNC: 139 MMOL/L (ref 135–147)
TRIGL SERPL-MCNC: 72 MG/DL (ref ?–150)
TSH SERPL DL<=0.05 MIU/L-ACNC: 1.05 UIU/ML (ref 0.45–4.5)

## 2024-11-18 PROCEDURE — 36415 COLL VENOUS BLD VENIPUNCTURE: CPT

## 2024-11-18 PROCEDURE — 83036 HEMOGLOBIN GLYCOSYLATED A1C: CPT

## 2024-11-18 PROCEDURE — 80053 COMPREHEN METABOLIC PANEL: CPT

## 2024-11-18 PROCEDURE — 80061 LIPID PANEL: CPT

## 2024-11-18 PROCEDURE — 84443 ASSAY THYROID STIM HORMONE: CPT

## 2024-11-21 RX ORDER — NEOMYCIN SULFATE, POLYMYXIN B SULFATE, AND DEXAMETHASONE 3.5; 10000; 1 MG/G; [USP'U]/G; MG/G
OINTMENT OPHTHALMIC 3 TIMES DAILY
COMMUNITY
Start: 2024-09-30

## 2024-11-21 RX ORDER — ACETAMINOPHEN AND CODEINE PHOSPHATE 300; 30 MG/1; MG/1
1-2 TABLET ORAL EVERY 6 HOURS PRN
COMMUNITY
Start: 2024-09-30

## 2024-11-22 ENCOUNTER — OFFICE VISIT (OUTPATIENT)
Dept: INTERNAL MEDICINE CLINIC | Facility: CLINIC | Age: 84
End: 2024-11-22
Payer: COMMERCIAL

## 2024-11-22 VITALS
DIASTOLIC BLOOD PRESSURE: 82 MMHG | OXYGEN SATURATION: 96 % | SYSTOLIC BLOOD PRESSURE: 130 MMHG | BODY MASS INDEX: 30.55 KG/M2 | HEART RATE: 70 BPM | WEIGHT: 183.6 LBS

## 2024-11-22 DIAGNOSIS — E66.811 CLASS 1 OBESITY DUE TO EXCESS CALORIES WITHOUT SERIOUS COMORBIDITY WITH BODY MASS INDEX (BMI) OF 30.0 TO 30.9 IN ADULT: ICD-10-CM

## 2024-11-22 DIAGNOSIS — E66.09 CLASS 1 OBESITY DUE TO EXCESS CALORIES WITHOUT SERIOUS COMORBIDITY WITH BODY MASS INDEX (BMI) OF 30.0 TO 30.9 IN ADULT: ICD-10-CM

## 2024-11-22 DIAGNOSIS — E03.9 HYPOTHYROIDISM, UNSPECIFIED TYPE: ICD-10-CM

## 2024-11-22 DIAGNOSIS — R73.03 PREDIABETES: ICD-10-CM

## 2024-11-22 DIAGNOSIS — Z00.00 MEDICARE ANNUAL WELLNESS VISIT, SUBSEQUENT: ICD-10-CM

## 2024-11-22 DIAGNOSIS — Z13.6 SCREENING FOR CARDIOVASCULAR CONDITION: ICD-10-CM

## 2024-11-22 DIAGNOSIS — Z23 ENCOUNTER FOR IMMUNIZATION: Primary | ICD-10-CM

## 2024-11-22 DIAGNOSIS — L98.9 SKIN LESION OF LEFT ARM: ICD-10-CM

## 2024-11-22 DIAGNOSIS — G89.29 CHRONIC LOW BACK PAIN, UNSPECIFIED BACK PAIN LATERALITY, UNSPECIFIED WHETHER SCIATICA PRESENT: ICD-10-CM

## 2024-11-22 DIAGNOSIS — I10 ESSENTIAL HYPERTENSION: ICD-10-CM

## 2024-11-22 DIAGNOSIS — I48.0 PAROXYSMAL ATRIAL FIBRILLATION (HCC): ICD-10-CM

## 2024-11-22 DIAGNOSIS — M54.50 CHRONIC LOW BACK PAIN, UNSPECIFIED BACK PAIN LATERALITY, UNSPECIFIED WHETHER SCIATICA PRESENT: ICD-10-CM

## 2024-11-22 DIAGNOSIS — I10 BENIGN ESSENTIAL HYPERTENSION: ICD-10-CM

## 2024-11-22 PROCEDURE — 90662 IIV NO PRSV INCREASED AG IM: CPT

## 2024-11-22 PROCEDURE — 99214 OFFICE O/P EST MOD 30 MIN: CPT | Performed by: INTERNAL MEDICINE

## 2024-11-22 PROCEDURE — G0008 ADMIN INFLUENZA VIRUS VAC: HCPCS

## 2024-11-22 PROCEDURE — G0439 PPPS, SUBSEQ VISIT: HCPCS | Performed by: INTERNAL MEDICINE

## 2024-11-22 RX ORDER — SEMAGLUTIDE 0.25 MG/.5ML
INJECTION, SOLUTION SUBCUTANEOUS
Qty: 2 ML | Refills: 0 | Status: SHIPPED | OUTPATIENT
Start: 2024-11-22

## 2024-11-22 NOTE — PROGRESS NOTES
Name: Peg Garrett      : 1940      MRN: [id number]  Encounter Provider: Daniel Loo DO  Encounter Date: 2024   Encounter department: [facility]    Assessment & Plan  Encounter for immunization    Orders:    influenza vaccine, high-dose, PF 0.5 mL (Fluzone High Dose)    Medicare annual wellness visit, subsequent  Assessment and plan 1.  Medicare subsequent annual wellness examination overall the patient is clinically stable and doing well, we encouraged the patient to follow a healthy and balanced diet.  We recommend that the patient exercise routinely approximately 30 minutes 5 times per week .  We have reviewed the patient's vaccines and have made recommendations for updates if necessary up-to-date o pneumococcal vaccine the patient considering the shingles vaccine and possible update on COVID-vaccine/RSV to be completed through pharmacy    .  We will be ordering screening laboratories which are age appropriate.  Return to the office in    6 months call if any problems.     Patient did receive her flu shot at today's visit  Skin lesion of left arm  Suspected actinic keratoses will have patient see dermatology  Orders:    Ambulatory Referral to Dermatology; Future    Class 1 obesity due to excess calories without serious comorbidity with body mass index (BMI) of 30.0 to 30.9 in adult  I have counselled the pt to follow a healthy and balanced diet ,and recommend routine exercise.  Patient did request consideration of Wegovy to help with weight management we did review the risk benefits and side effects of the medication patient does not have a history of pancreatitis/thyroid cancer I did explain to the patient we would need to slowly titrate the medication up and that I would like her to work with the pharmacist Ashlee when she receives the medication she should make an appointment with taylor Rosado for instructions how to use the medication and also slow titration  of medication and monitoring    Orders:    Semaglutide-Weight Management (Wegovy) 0.25 MG/0.5ML; Inject 0.25 mg under the skin weekly    Chronic low back pain, unspecified back pain laterality, unspecified whether sciatica present  Range of motion exercises start physical therapy  Orders:    Ambulatory Referral to Physical Therapy; Future    Hypothyroidism, unspecified type  Hypothyroidism controlled the patient is currently euthyroid I will be ordering a TSH prior to the next office visit and the patient will continue with current medical regiment; we will continue to monitor the patient's progress.  Orders:    TSH, 3rd generation; Future    Essential hypertension  Hypertension - controlled, I have counseled patient following healthy balance diet, I would like the patient reduce sodium, exercise routinely, I would like the patient continued the med current medical regiment and we will continue to monitor.       Prediabetes  Pre diabetes -I have counseled the patient to follow a healthy balanced diet, I have counseled patient reduce carbohydrates and sweets in the diet, I would like the patient exercise routinely.  I will be checking hemoglobin A1c and comprehensive metabolic panel.  Have counseled patient about the prevention of diabetes, and the risk of progression to type 2 diabetes.  Orders:    Comprehensive metabolic panel; Future    Hemoglobin A1C; Future    Screening for cardiovascular condition    Orders:    Lipid Panel with Direct LDL reflex; Future    Paroxysmal atrial fibrillation (HCC)  Clinically stable and doing well continue the current medical regiment will continue monitor.  Continue metoprolol 25 mg 1 tablet every 12 hours aspirin 81 mg once daily working with cardiology and the patient appears to be in normal sinus rhythm and stable       Benign essential hypertension  Hypertension - controlled, I have counseled patient following healthy balance diet, I would like the patient reduce sodium,  exercise routinely, I would like the patient continued the med current medical regiment and we will continue to monitor.     RTO in 6 months call if any problems    Depression Screening and Follow-up Plan: Patient was screened for depression during today's encounter. They screened negative with a PHQ-2 score of 0.    Urinary Incontinence Plan of Care: counseling topics discussed: practice Kegel (pelvic floor strengthening) exercises.       Preventive health issues were discussed with patient, and age appropriate screening tests were ordered as noted in patient's After Visit Summary. Personalized health advice and appropriate referrals for health education or preventive services given if needed, as noted in patient's After Visit Summary.    History of Present Illness     HPI 84-year old female coming in for a follow up office visit regarding skin lesion left arm, obesity class I chronic lower back pain, hypothyroidism, essential hypertension, prediabetes, paroxysmal atrial fibrillation and benign essential hypertension; the patient reports me compliant taking medications without untoward side effects the.  The patient is here to review his medical condition, update me on the medical condition and the patient reports me no hospitalizations and no ER visits.  No injuries no illnesses patient does report to me chronic lower back pain no radicular symptoms also lesion on the left forearm which has been chronic.  She is here to review her laboratories in detail.  No falls she does live at home with her  who is accompanying her today.  Patient would like to receive her flu shot today.  Pt reports at times fatigue , wake, hard to stand up and wake 1 month, pain in her lower back.  No radicular symptoms no weakness of the legs no chest pain no palpitation no racing heart  Patient Care Team:  Daniel Loo DO as PCP - General  DO Jose Sun MD Camille Eyvazzadeh, MD    Review of Systems    Constitutional:  Negative for activity change, appetite change and unexpected weight change.   HENT:  Negative for congestion and postnasal drip.    Eyes:  Negative for visual disturbance.   Respiratory:  Negative for cough and shortness of breath.    Cardiovascular:  Negative for chest pain.   Gastrointestinal:  Negative for abdominal pain, diarrhea, nausea and vomiting.   Neurological:  Negative for dizziness, light-headedness and headaches.   Hematological:  Negative for adenopathy.   Right forearm skin lesion, right lower back pain  Medical History Reviewed by provider this encounter:       Annual Wellness Visit Questionnaire   Peg is here for her Subsequent Wellness visit.     Health Risk Assessment:   Patient rates overall health as good. Patient feels that their physical health rating is same. Patient is satisfied with their life. Eyesight was rated as slightly worse. Hearing was rated as slightly worse. Patient feels that their emotional and mental health rating is same. Patients states they are sometimes angry. Patient states they are often unusually tired/fatigued. Pain experienced in the last 7 days has been some. Patient's pain rating has been 3/10. Right shoulder pain, stenosis    Depression Screening:   PHQ-2 Score: 0      Fall Risk Screening:   In the past year, patient has experienced: no history of falling in past year      Urinary Incontinence Screening:   Patient has leaked urine accidently in the last six months. Slight, urge inc infrequent    Home Safety:  Patient does not have trouble with stairs inside or outside of their home. Patient has working smoke alarms and has no working carbon monoxide detector. Home safety hazards include: none.     Nutrition:   Current diet is Limited junk food and Frequent junk food. sugar    Medications:   Patient is able to manage medications.     Activities of Daily Living (ADLs)/Instrumental Activities of Daily Living (IADLs):   Walk and transfer into and  out of bed and chair?: Yes  Dress and groom yourself?: Yes    Bathe or shower yourself?: Yes    Feed yourself? Yes  Do your laundry/housekeeping?: Yes  Manage your money, pay your bills and track your expenses?: Yes  Make your own meals?: Yes    Do your own shopping?: Yes    PREVENTIVE SCREENINGS      Cardiovascular Screening:    General: Screening Current      Diabetes Screening:     General: Screening Current      Colorectal Cancer Screening:     General: Screening Not Indicated      Breast Cancer Screening:     General: Screening Current      Cervical Cancer Screening:    General: Screening Not Indicated      Lung Cancer Screening:     General: Screening Not Indicated    Screening, Brief Intervention, and Referral to Treatment (SBIRT)    Screening  Typical number of drinks in a day: 0  Typical number of drinks in a week: 0  Interpretation: Low risk drinking behavior.    Social Drivers of Health     Financial Resource Strain: Low Risk  (11/21/2023)    Overall Financial Resource Strain (CARDIA)     Difficulty of Paying Living Expenses: Not hard at all   Transportation Needs: No Transportation Needs (11/21/2023)    PRAPARE - Transportation     Lack of Transportation (Medical): No     Lack of Transportation (Non-Medical): No     No results found.    Objective   /82   Pulse 70   Wt 83.3 kg (183 lb 9.6 oz)   SpO2 96%   BMI 30.55 kg/m²   Right forearm 1 cm thickened skin lesion white crusty consistent with actinic keratoses  Physical Exam  Vitals and nursing note reviewed.   Constitutional:       General: She is not in acute distress.     Appearance: Normal appearance. She is well-developed. She is obese. She is not ill-appearing, toxic-appearing or diaphoretic.   HENT:      Head: Normocephalic and atraumatic.      Right Ear: External ear normal.      Left Ear: External ear normal.      Nose: Nose normal.   Eyes:      Pupils: Pupils are equal, round, and reactive to light.   Cardiovascular:      Rate and  Rhythm: Normal rate and regular rhythm.      Heart sounds: Normal heart sounds. No murmur heard.  Pulmonary:      Effort: Pulmonary effort is normal.      Breath sounds: Normal breath sounds.   Abdominal:      General: There is no distension.      Palpations: Abdomen is soft.      Tenderness: There is no abdominal tenderness. There is no guarding.   Neurological:      Mental Status: She is alert.     Negative straight leg raising motor strength 5/5 t

## 2024-11-22 NOTE — PATIENT INSTRUCTIONS
Medicare Preventive Visit Patient Instructions  Thank you for completing your Welcome to Medicare Visit or Medicare Annual Wellness Visit today. Your next wellness visit will be due in one year (11/23/2025).  The screening/preventive services that you may require over the next 5-10 years are detailed below. Some tests may not apply to you based off risk factors and/or age. Screening tests ordered at today's visit but not completed yet may show as past due. Also, please note that scanned in results may not display below.  Preventive Screenings:  Service Recommendations Previous Testing/Comments   Colorectal Cancer Screening  * Colonoscopy    * Fecal Occult Blood Test (FOBT)/Fecal Immunochemical Test (FIT)  * Fecal DNA/Cologuard Test  * Flexible Sigmoidoscopy Age: 45-75 years old   Colonoscopy: every 10 years (may be performed more frequently if at higher risk)  OR  FOBT/FIT: every 1 year  OR  Cologuard: every 3 years  OR  Sigmoidoscopy: every 5 years  Screening may be recommended earlier than age 45 if at higher risk for colorectal cancer. Also, an individualized decision between you and your healthcare provider will decide whether screening between the ages of 76-85 would be appropriate. Colonoscopy: 05/10/2018  FOBT/FIT: Not on file  Cologuard: Not on file  Sigmoidoscopy: Not on file    Screening Not Indicated     Breast Cancer Screening Age: 40+ years old  Frequency: every 1-2 years  Not required if history of left and right mastectomy Mammogram: 01/25/2024    Screening Current   Cervical Cancer Screening Between the ages of 21-29, pap smear recommended once every 3 years.   Between the ages of 30-65, can perform pap smear with HPV co-testing every 5 years.   Recommendations may differ for women with a history of total hysterectomy, cervical cancer, or abnormal pap smears in past. Pap Smear: Not on file    Screening Not Indicated   Hepatitis C Screening Once for adults born between 1945 and 1965  More frequently  in patients at high risk for Hepatitis C Hep C Antibody: Not on file        Diabetes Screening 1-2 times per year if you're at risk for diabetes or have pre-diabetes Fasting glucose: 90 mg/dL (11/18/2024)  A1C: 5.7 % (11/18/2024)  Screening Current   Cholesterol Screening Once every 5 years if you don't have a lipid disorder. May order more often based on risk factors. Lipid panel: 11/18/2024    Screening Current     Other Preventive Screenings Covered by Medicare:  Abdominal Aortic Aneurysm (AAA) Screening: covered once if your at risk. You're considered to be at risk if you have a family history of AAA.  Lung Cancer Screening: covers low dose CT scan once per year if you meet all of the following conditions: (1) Age 55-77; (2) No signs or symptoms of lung cancer; (3) Current smoker or have quit smoking within the last 15 years; (4) You have a tobacco smoking history of at least 20 pack years (packs per day multiplied by number of years you smoked); (5) You get a written order from a healthcare provider.  Glaucoma Screening: covered annually if you're considered high risk: (1) You have diabetes OR (2) Family history of glaucoma OR (3)  aged 50 and older OR (4)  American aged 65 and older  Osteoporosis Screening: covered every 2 years if you meet one of the following conditions: (1) You're estrogen deficient and at risk for osteoporosis based off medical history and other findings; (2) Have a vertebral abnormality; (3) On glucocorticoid therapy for more than 3 months; (4) Have primary hyperparathyroidism; (5) On osteoporosis medications and need to assess response to drug therapy.   Last bone density test (DXA Scan): 11/06/2020.  HIV Screening: covered annually if you're between the age of 15-65. Also covered annually if you are younger than 15 and older than 65 with risk factors for HIV infection. For pregnant patients, it is covered up to 3 times per  pregnancy.    Immunizations:  Immunization Recommendations   Influenza Vaccine Annual influenza vaccination during flu season is recommended for all persons aged >= 6 months who do not have contraindications   Pneumococcal Vaccine   * Pneumococcal conjugate vaccine = PCV13 (Prevnar 13), PCV15 (Vaxneuvance), PCV20 (Prevnar 20)  * Pneumococcal polysaccharide vaccine = PPSV23 (Pneumovax) Adults 19-63 yo with certain risk factors or if 65+ yo  If never received any pneumonia vaccine: recommend Prevnar 20 (PCV20)  Give PCV20 if previously received 1 dose of PCV13 or PPSV23   Hepatitis B Vaccine 3 dose series if at intermediate or high risk (ex: diabetes, end stage renal disease, liver disease)   Respiratory syncytial virus (RSV) Vaccine - COVERED BY MEDICARE PART D  * RSVPreF3 (Arexvy) CDC recommends that adults 60 years of age and older may receive a single dose of RSV vaccine using shared clinical decision-making (SCDM)   Tetanus (Td) Vaccine - COST NOT COVERED BY MEDICARE PART B Following completion of primary series, a booster dose should be given every 10 years to maintain immunity against tetanus. Td may also be given as tetanus wound prophylaxis.   Tdap Vaccine - COST NOT COVERED BY MEDICARE PART B Recommended at least once for all adults. For pregnant patients, recommended with each pregnancy.   Shingles Vaccine (Shingrix) - COST NOT COVERED BY MEDICARE PART B  2 shot series recommended in those 19 years and older who have or will have weakened immune systems or those 50 years and older     Health Maintenance Due:      Topic Date Due   • Colorectal Cancer Screening  05/10/2023     Immunizations Due:      Topic Date Due   • Pneumococcal Vaccine: 65+ Years (3 of 3 - PPSV23 or PCV20) 08/18/2020   • Influenza Vaccine (1) 09/01/2024   • COVID-19 Vaccine (3 - 2024-25 season) 09/01/2024     Advance Directives   What are advance directives?  Advance directives are legal documents that state your wishes and plans for  medical care. These plans are made ahead of time in case you lose your ability to make decisions for yourself. Advance directives can apply to any medical decision, such as the treatments you want, and if you want to donate organs.   What are the types of advance directives?  There are many types of advance directives, and each state has rules about how to use them. You may choose a combination of any of the following:  Living will:  This is a written record of the treatment you want. You can also choose which treatments you do not want, which to limit, and which to stop at a certain time. This includes surgery, medicine, IV fluid, and tube feedings.   Durable power of  for healthcare (DPAHC):  This is a written record that states who you want to make healthcare choices for you when you are unable to make them for yourself. This person, called a proxy, is usually a family member or a friend. You may choose more than 1 proxy.  Do not resuscitate (DNR) order:  A DNR order is used in case your heart stops beating or you stop breathing. It is a request not to have certain forms of treatment, such as CPR. A DNR order may be included in other types of advance directives.  Medical directive:  This covers the care that you want if you are in a coma, near death, or unable to make decisions for yourself. You can list the treatments you want for each condition. Treatment may include pain medicine, surgery, blood transfusions, dialysis, IV or tube feedings, and a ventilator (breathing machine).  Values history:  This document has questions about your views, beliefs, and how you feel and think about life. This information can help others choose the care that you would choose.  Why are advance directives important?  An advance directive helps you control your care. Although spoken wishes may be used, it is better to have your wishes written down. Spoken wishes can be misunderstood, or not followed. Treatments may be given  even if you do not want them. An advance directive may make it easier for your family to make difficult choices about your care.   Urinary Incontinence   Urinary incontinence (UI)  is when you lose control of your bladder. UI develops because your bladder cannot store or empty urine properly. The 3 most common types of UI are stress incontinence, urge incontinence, or both.  Medicines:   May be given to help strengthen your bladder control. Report any side effects of medication to your healthcare provider.  Do pelvic muscle exercises often:  Your pelvic muscles help you stop urinating. Squeeze these muscles tight for 5 seconds, then relax for 5 seconds. Gradually work up to squeezing for 10 seconds. Do 3 sets of 15 repetitions a day, or as directed. This will help strengthen your pelvic muscles and improve bladder control.  Train your bladder:  Go to the bathroom at set times, such as every 2 hours, even if you do not feel the urge to go. You can also try to hold your urine when you feel the urge to go. For example, hold your urine for 5 minutes when you feel the urge to go. As that becomes easier, hold your urine for 10 minutes.   Self-care:   Keep a UI record.  Write down how often you leak urine and how much you leak. Make a note of what you were doing when you leaked urine.  Drink liquids as directed. You may need to limit the amount of liquid you drink to help control your urine leakage. Do not drink any liquid right before you go to bed. Limit or do not have drinks that contain caffeine or alcohol.   Prevent constipation.  Eat a variety of high-fiber foods. Good examples are high-fiber cereals, beans, vegetables, and whole-grain breads. Walking is the best way to trigger your intestines to have a bowel movement.  Exercise regularly and maintain a healthy weight.  Weight loss and exercise will decrease pressure on your bladder and help you control your leakage.   Use a catheter as directed  to help empty your  bladder. A catheter is a tiny, plastic tube that is put into your bladder to drain your urine.   Go to behavior therapy as directed.  Behavior therapy may be used to help you learn to control your urge to urinate.    Weight Management   Why it is important to manage your weight:  Being overweight increases your risk of health conditions such as heart disease, high blood pressure, type 2 diabetes, and certain types of cancer. It can also increase your risk for osteoarthritis, sleep apnea, and other respiratory problems. Aim for a slow, steady weight loss. Even a small amount of weight loss can lower your risk of health problems.  How to lose weight safely:  A safe and healthy way to lose weight is to eat fewer calories and get regular exercise. You can lose up about 1 pound a week by decreasing the number of calories you eat by 500 calories each day.   Healthy meal plan for weight management:  A healthy meal plan includes a variety of foods, contains fewer calories, and helps you stay healthy. A healthy meal plan includes the following:  Eat whole-grain foods more often.  A healthy meal plan should contain fiber. Fiber is the part of grains, fruits, and vegetables that is not broken down by your body. Whole-grain foods are healthy and provide extra fiber in your diet. Some examples of whole-grain foods are whole-wheat breads and pastas, oatmeal, brown rice, and bulgur.  Eat a variety of vegetables every day.  Include dark, leafy greens such as spinach, kale, mary jane greens, and mustard greens. Eat yellow and orange vegetables such as carrots, sweet potatoes, and winter squash.   Eat a variety of fruits every day.  Choose fresh or canned fruit (canned in its own juice or light syrup) instead of juice. Fruit juice has very little or no fiber.  Eat low-fat dairy foods.  Drink fat-free (skim) milk or 1% milk. Eat fat-free yogurt and low-fat cottage cheese. Try low-fat cheeses such as mozzarella and other reduced-fat  cheeses.  Choose meat and other protein foods that are low in fat.  Choose beans or other legumes such as split peas or lentils. Choose fish, skinless poultry (chicken or turkey), or lean cuts of red meat (beef or pork). Before you cook meat or poultry, cut off any visible fat.   Use less fat and oil.  Try baking foods instead of frying them. Add less fat, such as margarine, sour cream, regular salad dressing and mayonnaise to foods. Eat fewer high-fat foods. Some examples of high-fat foods include french fries, doughnuts, ice cream, and cakes.  Eat fewer sweets.  Limit foods and drinks that are high in sugar. This includes candy, cookies, regular soda, and sweetened drinks.  Exercise:  Exercise at least 30 minutes per day on most days of the week. Some examples of exercise include walking, biking, dancing, and swimming. You can also fit in more physical activity by taking the stairs instead of the elevator or parking farther away from stores. Ask your healthcare provider about the best exercise plan for you.      © Copyright [employer] 2018 Information is for End User's use only and may not be sold, redistributed or otherwise used for commercial purposes. All illustrations and images included in CareNotes® are the copyrighted property of [employer] or [employer]      Medicare Preventive Visit Patient Instructions  Thank you for completing your Welcome to Medicare Visit or Medicare Annual Wellness Visit today. Your next wellness visit will be due in one year (11/23/2025).  The screening/preventive services that you may require over the next 5-10 years are detailed below. Some tests may not apply to you based off risk factors and/or age. Screening tests ordered at today's visit but not completed yet may show as past due. Also, please note that scanned in results may not display below.  Preventive Screenings:  Service Recommendations Previous Testing/Comments   Colorectal Cancer Screening  *  Colonoscopy    * Fecal Occult Blood Test (FOBT)/Fecal Immunochemical Test (FIT)  * Fecal DNA/Cologuard Test  * Flexible Sigmoidoscopy Age: 45-75 years old   Colonoscopy: every 10 years (may be performed more frequently if at higher risk)  OR  FOBT/FIT: every 1 year  OR  Cologuard: every 3 years  OR  Sigmoidoscopy: every 5 years  Screening may be recommended earlier than age 45 if at higher risk for colorectal cancer. Also, an individualized decision between you and your healthcare provider will decide whether screening between the ages of 76-85 would be appropriate. Colonoscopy: 05/10/2018  FOBT/FIT: Not on file  Cologuard: Not on file  Sigmoidoscopy: Not on file    Screening Not Indicated     Breast Cancer Screening Age: 40+ years old  Frequency: every 1-2 years  Not required if history of left and right mastectomy Mammogram: 01/25/2024    Screening Current   Cervical Cancer Screening Between the ages of 21-29, pap smear recommended once every 3 years.   Between the ages of 30-65, can perform pap smear with HPV co-testing every 5 years.   Recommendations may differ for women with a history of total hysterectomy, cervical cancer, or abnormal pap smears in past. Pap Smear: Not on file    Screening Not Indicated   Hepatitis C Screening Once for adults born between 1945 and 1965  More frequently in patients at high risk for Hepatitis C Hep C Antibody: Not on file        Diabetes Screening 1-2 times per year if you're at risk for diabetes or have pre-diabetes Fasting glucose: 90 mg/dL (11/18/2024)  A1C: 5.7 % (11/18/2024)  Screening Current   Cholesterol Screening Once every 5 years if you don't have a lipid disorder. May order more often based on risk factors. Lipid panel: 11/18/2024    Screening Current     Other Preventive Screenings Covered by Medicare:  Abdominal Aortic Aneurysm (AAA) Screening: covered once if your at risk. You're considered to be at risk if you have a family history of AAA.  Lung Cancer  Screening: covers low dose CT scan once per year if you meet all of the following conditions: (1) Age 55-77; (2) No signs or symptoms of lung cancer; (3) Current smoker or have quit smoking within the last 15 years; (4) You have a tobacco smoking history of at least 20 pack years (packs per day multiplied by number of years you smoked); (5) You get a written order from a healthcare provider.  Glaucoma Screening: covered annually if you're considered high risk: (1) You have diabetes OR (2) Family history of glaucoma OR (3)  aged 50 and older OR (4)  American aged 65 and older  Osteoporosis Screening: covered every 2 years if you meet one of the following conditions: (1) You're estrogen deficient and at risk for osteoporosis based off medical history and other findings; (2) Have a vertebral abnormality; (3) On glucocorticoid therapy for more than 3 months; (4) Have primary hyperparathyroidism; (5) On osteoporosis medications and need to assess response to drug therapy.   Last bone density test (DXA Scan): 11/06/2020.  HIV Screening: covered annually if you're between the age of 15-65. Also covered annually if you are younger than 15 and older than 65 with risk factors for HIV infection. For pregnant patients, it is covered up to 3 times per pregnancy.    Immunizations:  Immunization Recommendations   Influenza Vaccine Annual influenza vaccination during flu season is recommended for all persons aged >= 6 months who do not have contraindications   Pneumococcal Vaccine   * Pneumococcal conjugate vaccine = PCV13 (Prevnar 13), PCV15 (Vaxneuvance), PCV20 (Prevnar 20)  * Pneumococcal polysaccharide vaccine = PPSV23 (Pneumovax) Adults 19-63 yo with certain risk factors or if 65+ yo  If never received any pneumonia vaccine: recommend Prevnar 20 (PCV20)  Give PCV20 if previously received 1 dose of PCV13 or PPSV23   Hepatitis B Vaccine 3 dose series if at intermediate or high risk (ex: diabetes, end  stage renal disease, liver disease)   Respiratory syncytial virus (RSV) Vaccine - COVERED BY MEDICARE PART D  * RSVPreF3 (Arexvy) CDC recommends that adults 60 years of age and older may receive a single dose of RSV vaccine using shared clinical decision-making (SCDM)   Tetanus (Td) Vaccine - COST NOT COVERED BY MEDICARE PART B Following completion of primary series, a booster dose should be given every 10 years to maintain immunity against tetanus. Td may also be given as tetanus wound prophylaxis.   Tdap Vaccine - COST NOT COVERED BY MEDICARE PART B Recommended at least once for all adults. For pregnant patients, recommended with each pregnancy.   Shingles Vaccine (Shingrix) - COST NOT COVERED BY MEDICARE PART B  2 shot series recommended in those 19 years and older who have or will have weakened immune systems or those 50 years and older     Health Maintenance Due:      Topic Date Due   • Colorectal Cancer Screening  05/10/2023     Immunizations Due:      Topic Date Due   • Pneumococcal Vaccine: 65+ Years (3 of 3 - PPSV23 or PCV20) 08/18/2020   • Influenza Vaccine (1) 09/01/2024   • COVID-19 Vaccine (3 - 2024-25 season) 09/01/2024     Advance Directives   What are advance directives?  Advance directives are legal documents that state your wishes and plans for medical care. These plans are made ahead of time in case you lose your ability to make decisions for yourself. Advance directives can apply to any medical decision, such as the treatments you want, and if you want to donate organs.   What are the types of advance directives?  There are many types of advance directives, and each state has rules about how to use them. You may choose a combination of any of the following:  Living will:  This is a written record of the treatment you want. You can also choose which treatments you do not want, which to limit, and which to stop at a certain time. This includes surgery, medicine, IV fluid, and tube feedings.    Durable power of  for healthcare (DPA):  This is a written record that states who you want to make healthcare choices for you when you are unable to make them for yourself. This person, called a proxy, is usually a family member or a friend. You may choose more than 1 proxy.  Do not resuscitate (DNR) order:  A DNR order is used in case your heart stops beating or you stop breathing. It is a request not to have certain forms of treatment, such as CPR. A DNR order may be included in other types of advance directives.  Medical directive:  This covers the care that you want if you are in a coma, near death, or unable to make decisions for yourself. You can list the treatments you want for each condition. Treatment may include pain medicine, surgery, blood transfusions, dialysis, IV or tube feedings, and a ventilator (breathing machine).  Values history:  This document has questions about your views, beliefs, and how you feel and think about life. This information can help others choose the care that you would choose.  Why are advance directives important?  An advance directive helps you control your care. Although spoken wishes may be used, it is better to have your wishes written down. Spoken wishes can be misunderstood, or not followed. Treatments may be given even if you do not want them. An advance directive may make it easier for your family to make difficult choices about your care.   Urinary Incontinence   Urinary incontinence (UI)  is when you lose control of your bladder. UI develops because your bladder cannot store or empty urine properly. The 3 most common types of UI are stress incontinence, urge incontinence, or both.  Medicines:   May be given to help strengthen your bladder control. Report any side effects of medication to your healthcare provider.  Do pelvic muscle exercises often:  Your pelvic muscles help you stop urinating. Squeeze these muscles tight for 5 seconds, then relax for 5  seconds. Gradually work up to squeezing for 10 seconds. Do 3 sets of 15 repetitions a day, or as directed. This will help strengthen your pelvic muscles and improve bladder control.  Train your bladder:  Go to the bathroom at set times, such as every 2 hours, even if you do not feel the urge to go. You can also try to hold your urine when you feel the urge to go. For example, hold your urine for 5 minutes when you feel the urge to go. As that becomes easier, hold your urine for 10 minutes.   Self-care:   Keep a UI record.  Write down how often you leak urine and how much you leak. Make a note of what you were doing when you leaked urine.  Drink liquids as directed. You may need to limit the amount of liquid you drink to help control your urine leakage. Do not drink any liquid right before you go to bed. Limit or do not have drinks that contain caffeine or alcohol.   Prevent constipation.  Eat a variety of high-fiber foods. Good examples are high-fiber cereals, beans, vegetables, and whole-grain breads. Walking is the best way to trigger your intestines to have a bowel movement.  Exercise regularly and maintain a healthy weight.  Weight loss and exercise will decrease pressure on your bladder and help you control your leakage.   Use a catheter as directed  to help empty your bladder. A catheter is a tiny, plastic tube that is put into your bladder to drain your urine.   Go to behavior therapy as directed.  Behavior therapy may be used to help you learn to control your urge to urinate.    Weight Management   Why it is important to manage your weight:  Being overweight increases your risk of health conditions such as heart disease, high blood pressure, type 2 diabetes, and certain types of cancer. It can also increase your risk for osteoarthritis, sleep apnea, and other respiratory problems. Aim for a slow, steady weight loss. Even a small amount of weight loss can lower your risk of health problems.  How to lose  weight safely:  A safe and healthy way to lose weight is to eat fewer calories and get regular exercise. You can lose up about 1 pound a week by decreasing the number of calories you eat by 500 calories each day.   Healthy meal plan for weight management:  A healthy meal plan includes a variety of foods, contains fewer calories, and helps you stay healthy. A healthy meal plan includes the following:  Eat whole-grain foods more often.  A healthy meal plan should contain fiber. Fiber is the part of grains, fruits, and vegetables that is not broken down by your body. Whole-grain foods are healthy and provide extra fiber in your diet. Some examples of whole-grain foods are whole-wheat breads and pastas, oatmeal, brown rice, and bulgur.  Eat a variety of vegetables every day.  Include dark, leafy greens such as spinach, kale, mary jane greens, and mustard greens. Eat yellow and orange vegetables such as carrots, sweet potatoes, and winter squash.   Eat a variety of fruits every day.  Choose fresh or canned fruit (canned in its own juice or light syrup) instead of juice. Fruit juice has very little or no fiber.  Eat low-fat dairy foods.  Drink fat-free (skim) milk or 1% milk. Eat fat-free yogurt and low-fat cottage cheese. Try low-fat cheeses such as mozzarella and other reduced-fat cheeses.  Choose meat and other protein foods that are low in fat.  Choose beans or other legumes such as split peas or lentils. Choose fish, skinless poultry (chicken or turkey), or lean cuts of red meat (beef or pork). Before you cook meat or poultry, cut off any visible fat.   Use less fat and oil.  Try baking foods instead of frying them. Add less fat, such as margarine, sour cream, regular salad dressing and mayonnaise to foods. Eat fewer high-fat foods. Some examples of high-fat foods include french fries, doughnuts, ice cream, and cakes.  Eat fewer sweets.  Limit foods and drinks that are high in sugar. This includes candy, cookies,  regular soda, and sweetened drinks.  Exercise:  Exercise at least 30 minutes per day on most days of the week. Some examples of exercise include walking, biking, dancing, and swimming. You can also fit in more physical activity by taking the stairs instead of the elevator or parking farther away from stores. Ask your healthcare provider about the best exercise plan for you.      © Copyright [employer] 2018 Information is for End User's use only and may not be sold, redistributed or otherwise used for commercial purposes. All illustrations and images included in CareNotes® are the copyrighted property of [employer] or [employer]

## 2024-11-22 NOTE — PROGRESS NOTES
Name: Peg Garrett      : 1940      MRN: [id number]  Encounter Provider: Daniel Loo DO  Encounter Date: 2024   Encounter department: [facility]    Assessment & Plan       Preventive health issues were discussed with patient, and age appropriate screening tests were ordered as noted in patient's After Visit Summary. Personalized health advice and appropriate referrals for health education or preventive services given if needed, as noted in patient's After Visit Summary.    History of Present Illness     HPI   Patient Care Team:  Daniel Loo DO as PCP - General  DO Jose Sun MD Camille Eyvazzadeh, MD    Review of Systems  Medical History Reviewed by provider this encounter:       Annual Wellness Visit Questionnaire   Peg is here for her Subsequent Wellness visit.     Health Risk Assessment:   Patient rates overall health as very good. Patient feels that their physical health rating is same. Patient is very satisfied with their life. Eyesight was rated as slightly worse. Hearing was rated as slightly worse. Patient feels that their emotional and mental health rating is same. Patients states they are sometimes angry. Patient states they are often unusually tired/fatigued. Pain experienced in the last 7 days has been some. Patient's pain rating has been 5/10. Patient states that she has experienced no weight loss or gain in last 6 months.     Depression Screening:   PHQ-2 Score: 0      Fall Risk Screening:   In the past year, patient has experienced: no history of falling in past year      Urinary Incontinence Screening:   Patient has not leaked urine accidently in the last six months.     Home Safety:  Patient does not have trouble with stairs inside or outside of their home. Patient has working smoke alarms and has no working carbon monoxide detector. Home safety hazards include: none.     Nutrition:   Current diet is Regular.      Medications:   Patient is currently taking over-the-counter supplements. OTC medications include: omega xl , Multi vitamin. Patient is able to manage medications.     Activities of Daily Living (ADLs)/Instrumental Activities of Daily Living (IADLs):   Walk and transfer into and out of bed and chair?: Yes  Dress and groom yourself?: Yes    Bathe or shower yourself?: Yes    Feed yourself? Yes  Do your laundry/housekeeping?: Yes  Manage your money, pay your bills and track your expenses?: Yes  Make your own meals?: Yes    Do your own shopping?: Yes    Advance Care Planning:   Living will: Yes    Durable POA for healthcare: Yes    Advanced directive: Yes      PREVENTIVE SCREENINGS      Cardiovascular Screening:    General: Screening Current      Diabetes Screening:     General: Screening Current      Colorectal Cancer Screening:     General: Screening Not Indicated      Breast Cancer Screening:     General: Screening Current      Cervical Cancer Screening:    General: Screening Not Indicated      Lung Cancer Screening:     General: Screening Not Indicated    Screening, Brief Intervention, and Referral to Treatment (SBIRT)    Screening      Single Item Drug Screening:  How often have you used an illegal drug (including marijuana) or a prescription medication for non-medical reasons in the past year? never    Single Item Drug Screen Score: 0  Interpretation: Negative screen for possible drug use disorder    Social Drivers of Health     Financial Resource Strain: Low Risk  (11/21/2023)    Overall Financial Resource Strain (CARDIA)     Difficulty of Paying Living Expenses: Not hard at all   Transportation Needs: No Transportation Needs (11/21/2023)    PRAPARE - Transportation     Lack of Transportation (Medical): No     Lack of Transportation (Non-Medical): No     No results found.    Objective   /82   Pulse 70   Wt 83.3 kg (183 lb 9.6 oz)   SpO2 96%   BMI 30.55 kg/m²     Physical Exam     No

## 2024-11-24 PROBLEM — Z00.00 MEDICARE ANNUAL WELLNESS VISIT, SUBSEQUENT: Status: ACTIVE | Noted: 2024-11-24

## 2024-11-24 NOTE — ASSESSMENT & PLAN NOTE
Assessment and plan 1.  Medicare subsequent annual wellness examination overall the patient is clinically stable and doing well, we encouraged the patient to follow a healthy and balanced diet.  We recommend that the patient exercise routinely approximately 30 minutes 5 times per week .  We have reviewed the patient's vaccines and have made recommendations for updates if necessary up-to-date o pneumococcal vaccine the patient considering the shingles vaccine and possible update on COVID-vaccine/RSV to be completed through pharmacy    .  We will be ordering screening laboratories which are age appropriate.  Return to the office in    6 months call if any problems.     Patient did receive her flu shot at today's visit

## 2024-11-24 NOTE — ASSESSMENT & PLAN NOTE
I have counselled the pt to follow a healthy and balanced diet ,and recommend routine exercise.  Patient did request consideration of Wegovy to help with weight management we did review the risk benefits and side effects of the medication patient does not have a history of pancreatitis/thyroid cancer I did explain to the patient we would need to slowly titrate the medication up and that I would like her to work with the pharmacist Ashlee when she receives the medication she should make an appointment with taylor Rosado for instructions how to use the medication and also slow titration of medication and monitoring    Orders:    Semaglutide-Weight Management (Wegovy) 0.25 MG/0.5ML; Inject 0.25 mg under the skin weekly

## 2024-11-24 NOTE — ASSESSMENT & PLAN NOTE
Hypertension - controlled, I have counseled patient following healthy balance diet, I would like the patient reduce sodium, exercise routinely, I would like the patient continued the med current medical regiment and we will continue to monitor.     RTO in 6 months call if any problems

## 2024-11-24 NOTE — ASSESSMENT & PLAN NOTE
Clinically stable and doing well continue the current medical regiment will continue monitor.  Continue metoprolol 25 mg 1 tablet every 12 hours aspirin 81 mg once daily working with cardiology and the patient appears to be in normal sinus rhythm and stable

## 2024-11-27 ENCOUNTER — TELEPHONE (OUTPATIENT)
Dept: INTERNAL MEDICINE CLINIC | Facility: CLINIC | Age: 84
End: 2024-11-27

## 2024-11-27 NOTE — TELEPHONE ENCOUNTER
Patient needs a prior authorization for:    Wegovy 0.25 mg/0.5 ml [employer]      KEY # [id number]    [facility] [phone]

## 2024-11-29 NOTE — TELEPHONE ENCOUNTER
PA Wegovy 0.25 mg/0.5 ml SUBMITTED     to [employer]     via    [x]CMM-KEY: [id number]   []Surescripts-Case ID #    []Availity-Auth ID #  NDC #    []Faxed to plan   []Other website    []Phone call Case ID #      []PA sent as URGENT    All office notes, labs and other pertaining documents and studies sent. Clinical questions answered. Awaiting determination from insurance company.     Turnaround time for your insurance to make a decision on your Prior Authorization can take 7-21 business days.

## 2024-12-02 NOTE — TELEPHONE ENCOUNTER
PA Wegovy 0.25 mg/0.5 ml DENIED    Reason:(Screenshot if applicable)        Message sent to office clinical pool Yes    Denial letter scanned into Media Yes    Appeal started No (Provider will need to decide if appeal is warranted and send clinical documentation to Prior Authorization Team for initiation.)    **Please follow up with your patient regarding denial and next steps**

## 2024-12-24 PROBLEM — Z00.00 MEDICARE ANNUAL WELLNESS VISIT, SUBSEQUENT: Status: RESOLVED | Noted: 2024-11-24 | Resolved: 2024-12-24

## 2025-01-13 DIAGNOSIS — E03.9 HYPOTHYROIDISM, UNSPECIFIED TYPE: ICD-10-CM

## 2025-01-14 RX ORDER — LEVOTHYROXINE SODIUM 88 UG/1
88 TABLET ORAL DAILY
Qty: 90 TABLET | Refills: 1 | Status: SHIPPED | OUTPATIENT
Start: 2025-01-14

## 2025-01-23 ENCOUNTER — OFFICE VISIT (OUTPATIENT)
Age: 85
End: 2025-01-23

## 2025-01-23 VITALS — WEIGHT: 183 LBS | BODY MASS INDEX: 30.45 KG/M2 | TEMPERATURE: 98 F

## 2025-01-23 DIAGNOSIS — L82.1 SEBORRHEIC KERATOSES: ICD-10-CM

## 2025-01-23 DIAGNOSIS — L57.0 ACTINIC KERATOSES: ICD-10-CM

## 2025-01-23 DIAGNOSIS — D48.5 NEOPLASM OF UNCERTAIN BEHAVIOR OF SKIN: ICD-10-CM

## 2025-01-23 DIAGNOSIS — D22.70 MULTIPLE BENIGN MELANOCYTIC NEVI OF UPPER EXTREMITY, LOWER EXTREMITY, AND TRUNK: ICD-10-CM

## 2025-01-23 DIAGNOSIS — D18.01 CHERRY ANGIOMA: ICD-10-CM

## 2025-01-23 DIAGNOSIS — Z85.89 HISTORY OF SQUAMOUS CELL CARCINOMA: Primary | ICD-10-CM

## 2025-01-23 DIAGNOSIS — D22.60 MULTIPLE BENIGN MELANOCYTIC NEVI OF UPPER EXTREMITY, LOWER EXTREMITY, AND TRUNK: ICD-10-CM

## 2025-01-23 DIAGNOSIS — D22.5 MULTIPLE BENIGN MELANOCYTIC NEVI OF UPPER EXTREMITY, LOWER EXTREMITY, AND TRUNK: ICD-10-CM

## 2025-01-23 DIAGNOSIS — L81.4 LENTIGINES: ICD-10-CM

## 2025-01-23 PROCEDURE — 88342 IMHCHEM/IMCYTCHM 1ST ANTB: CPT | Performed by: STUDENT IN AN ORGANIZED HEALTH CARE EDUCATION/TRAINING PROGRAM

## 2025-01-23 PROCEDURE — 88305 TISSUE EXAM BY PATHOLOGIST: CPT | Performed by: STUDENT IN AN ORGANIZED HEALTH CARE EDUCATION/TRAINING PROGRAM

## 2025-01-23 PROCEDURE — 88341 IMHCHEM/IMCYTCHM EA ADD ANTB: CPT | Performed by: STUDENT IN AN ORGANIZED HEALTH CARE EDUCATION/TRAINING PROGRAM

## 2025-01-23 NOTE — PROGRESS NOTES
"[facility] Note     Patient Name: Peg Garrett  Encounter Date: 1/23/25     Have you been cared for by a [facility] Dermatologist in the last 3 years and, if so, which description applies to you?    Yes.  I have been here within the last 3 years, and my medical history has NOT changed since that time.  I am FEMALE/of child-bearing potential.    REVIEW OF SYSTEMS:  Have you recently had or currently have any of the following? No changes in my recent health.   PAST MEDICAL HISTORY:  Have you personally ever had or currently have any of the following?  If \"YES,\" then please provide more detail. No changes in my medical history.   HISTORY OF IMMUNOSUPPRESSION: Do you have a history of any of the following:  Systemic Immunosuppression such as Diabetes, Biologic or Immunotherapy, Chemotherapy, Organ Transplantation, Bone Marrow Transplantation or Prednisone?  No     Answering \"YES\" requires the addition of the dotphrase \"IMMUNOSUPPRESSED\" as the first diagnosis of the patient's visit.   FAMILY HISTORY:  Any \"first degree relatives\" (parent, brother, sister, or child) with the following?    No changes in my family's known health.   PATIENT EXPERIENCE:    Do you want the Dermatologist to perform a COMPLETE skin exam today including a clinical examination under the \"bra and underwear\" areas?  Yes  If necessary, do we have your permission to call and leave a detailed message on your Preferred Phone number that includes your specific medical information?  Yes      Allergies   Allergen Reactions    Dust Mite Extract Allergic Rhinitis    Fluticasone-Salmeterol Hives     Category: Allergy;     Molds & Smuts Allergic Rhinitis    Morphine Nausea Only    Penicillins Hives     Category: Allergy;     Pollen Extract Allergic Rhinitis, Cough, Dermatitis and Itching    Latex Rash      Current Outpatient Medications:     Acetaminophen 650 MG/20.3ML SOLN, Take 650 mg by mouth every 4 (four) hours as needed , Disp: , " Rfl:     acetaminophen-codeine (TYLENOL with CODEINE #3) 300-30 MG per tablet, Take 1-2 tablets by mouth every 6 (six) hours as needed, Disp: , Rfl:     Ascorbic Acid (VITAMIN C) 500 MG CAPS, Take by mouth, Disp: , Rfl:     aspirin 81 MG tablet, Take 1 tablet by mouth daily, Disp: , Rfl:     Calcium Carb-Cholecalciferol (CALTRATE 600+D) 600-800 MG-UNIT TABS, Take by mouth, Disp: , Rfl:     Cholecalciferol (VITAMIN D-3) 1000 units CAPS, Take by mouth, Disp: , Rfl:     Glucosamine 750 MG TABS, Take by mouth, Disp: , Rfl:     levothyroxine 88 mcg tablet, TAKE ONE TABLET BY MOUTH EVERY DAY, Disp: 90 tablet, Rfl: 1    Loratadine 10 MG CAPS, Take by mouth daily, Disp: , Rfl:     metoprolol tartrate (LOPRESSOR) 25 mg tablet, TAKE ONE TABLET BY MOUTH EVERY 12 HOURS, Disp: 180 tablet, Rfl: 1    Multiple Vitamins-Minerals (CENTRUM SILVER ULTRA WOMENS) TABS, Take by mouth, Disp: , Rfl:     neomycin-polymyxin-dexamethasone (MAXITROL) 0.35%-10,000 units/g-0.1%, 3 (three) times a day, Disp: , Rfl:     NON FORMULARY, Omega XL 4 tabs daily (Patient taking differently: Apply 2 tablets to the mouth or throat daily Omega XL 4 tabs daily), Disp: , Rfl:     Semaglutide-Weight Management (Wegovy) 0.25 MG/0.5ML, Inject 0.25 mg under the skin weekly, Disp: 2 mL, Rfl: 0    Xiidra 5 % op solution, Administer 1 drop to both eyes 2 (two) times a day, Disp: , Rfl:         Whom besides the patient is providing clinical information about today's encounter?   NO ADDITIONAL HISTORIAN (patient alone provided history).  is present.    Physical Exam and Assessment/Plan by Diagnosis:    HISTORY OF SQUAMOUS CELL CARCINOMA IN SITU  Physical Exam:  Anatomic Location Affected:  Left nasal tip, Right upper anterior shin  NER              Additional History of Present Condition:  Patient was treated with Efudex cream to treat the nose and shin in 2019.  Assessment and Plan:  Based on a thorough discussion of this condition and the management approach  "to it (including a comprehensive discussion of the known risks, side effects and potential benefits of treatment), the patient (family) agrees to implement the following specific plan:  Continue to have annual skin checks  Use SPF 30+.      CHERRY ANGIOMAS  Physical Exam:  Anatomic Location Affected:  Trunk and extremities  Morphological Description:  Scattered cherry red papules  Denies pain, itch, bleeding. No treatments tried. Present for years. Present constantly; no modifying factors which make it worse or better.     Assessment and Plan:  Based on a thorough discussion of this condition and the management approach to it (including a comprehensive discussion of the known risks, side effects and potential benefits of treatment), the patient (family) agrees to implement the following specific plan:  Reassure benign        SEBORRHEIC KERATOSIS; NON-INFLAMED  Physical Exam:  Anatomic Location Affected:  Trunk and extremities  Morphological Description:  Waxy, smooth to warty textured, yellow to brownish-grey to dark brown to blackish, discrete, \"stuck-on\" appearing papules.  Present for years. Denies pain, itch, bleeding.      Additional History of Present Condition:  Present constantly; no modifying factors which make it worse or better. No prior treatment.       Assessment and Plan:  Based on a thorough discussion of this condition and the management approach to it (including a comprehensive discussion of the known risks, side effects and potential benefits of treatment), the patient (family) agrees to implement the following specific plan:  Reassure benign  Use sun protection.  Apply SPF 30 or higher at least three times a day.  Wear sun protecting clothing and hats.        SOLAR LENTIGINES   OTHER SKIN CHANGES DUE TO CHRONIC EXPOSURE TO NONIONIZING RADIATION  Physical Exam:  Anatomic Location Affected:  Sun exposed areas of back, chest, arms, legs  Morphological Description:  Multiple scattered brown to tan " "evenly pigmented macules   Denies pain, itch, bleeding. No treatments tried. Present for months - years. Reports getting newer lesions with sun exposure.      Assessment and Plan:  Based on a thorough discussion of this condition and the management approach to it (including a comprehensive discussion of the known risks, side effects and potential benefits of treatment), the patient (family) agrees to implement the following specific plan:  Reassure benign  Use sun protection.  Apply SPF 30 or higher at least three times a day.  Wear sun protecting clothing and hats.         MULTIPLE MELANOCYTIC NEVI (\"Moles\")  Physical Exam:  Anatomic Location Affected: Trunk and extremities  Morphological Description:  Scattered, round to ovoid, symmetrical-appearing, even bordered, skin colored to dark brown macules/papules  Denies pain, itch, bleeding. No treatments tried. Present for years. Present constantly; no modifying factors which make it worse or better. Denies actively changing or growing moles.      Assessment and Plan:  Based on a thorough discussion of this condition and the management approach to it (including a comprehensive discussion of the known risks, side effects and potential benefits of treatment), the patient (family) agrees to implement the following specific plan:  Reassure benign  Monitor for changes  Use sun protection.  Apply SPF 30 or higher at least three times a day.  Wear sun protecting clothing and hats.    Worrisome signs of skin malignancy discussed, questions answered. Regular self-skin check discussed. Advised to call or return to office if patient notices any spots of concern, rapidly growing/changing lesions, bleeding lesions, non-healing lesions. Advised regular SPF use.        ACTINIC KERATOSES  Physical Exam:  Anatomic Location Affected:  forehead, chest  Morphological Description:  Thin pink papule(s) with gritty scale     Assessment and Plan:  Based on a thorough discussion of this " condition and the management approach to it (including a comprehensive discussion of the known risks, side effects and potential benefits of treatment), the patient (family) agrees to implement the following specific plan:  Treated with cryotherapy today; written and verbal consent obtained     PROCEDURE:  DESTRUCTION OF PRE-MALIGNANT LESIONS  After a thorough discussion of treatment options and risk/benefits/alternatives (including but not limited to local pain, scarring, dyspigmentation, blistering, and possible superinfection), verbal and written consent were obtained and the aforementioned lesions were treated on with cryotherapy using liquid nitrogen x 2 cycles for 5-10 seconds. The patient tolerated the procedure well, and after-care instructions were provided.     TOTAL NUMBER of 1 pre-malignant lesion were treated today on the ANATOMIC LOCATION:  forehead, chest.     Patient instructions:  Your pre-cancerous lesions (called actinic keratosis) were treated with liquid nitrogen today. The treated areas will get more red, crusted over the next few days. There might be some blistering. Apply vaseline to the treated area for the next week to help it heal fully. Do not pick at the area. Return in 3-4 weeks for another round of liquid nitrogen treatment if lesion(s)  fails to fully resolve.       INFLAMED SEBORRHEIC KERATOSIS    Physical Exam:  Anatomic Location Affected & Morphological Description:  Well demarcated brown stuck on papule/s with erythema and excoriation.       Additional History of Present Condition:  Present for years, recently has become irritated.    Assessment and Plan:  Based on a thorough discussion of this condition and the management approach to it (including a comprehensive discussion of the known risks, side effects and potential benefits of treatment), the patient (family) agrees to implement the following specific plan:  Cryotherapy  After care discussed    PROCEDURE:  DESTRUCTION OF  "BENIGN LESIONS  After a thorough discussion of treatment options and risk/benefits/alternatives (including but not limited to local pain, scarring, dyspigmentation, blistering, and possible superinfection), verbal and written consent were obtained and the aforementioned lesions were treated on with cryotherapy using liquid nitrogen x 1 cycle for 5-10 seconds.    TOTAL NUMBER of 1 lesion were treated today on the ANATOMIC LOCATION: chest    The patient tolerated the procedure well, and after-care instructions were provided.  NEOPLASM OF UNCERTAIN BEHAVIOR OF SKIN  Physical Exam:  (Anatomic Location); (Size and Morphological Description); (Differential Diagnosis):  A: left forearm; 0.8 cm pink hyperkeratotic papule; HAK vs. SCC  Pertinent Positives:  Pertinent Negatives:      Additional History of Present Condition:  patient cannot remember how long this spot has been present.    Assessment and Plan:  I have discussed with the patient that a sample of skin via a \"skin biopsy” would be potentially helpful to further make a specific diagnosis under the microscope.  Based on a thorough discussion of this condition and the management approach to it (including a comprehensive discussion of the known risks, side effects and potential benefits of treatment), the patient (family) agrees to implement the following specific plan:    Procedure:  Skin Biopsy.  After a thorough discussion of treatment options and risk/benefits/alternatives (including but not limited to local pain, scarring, dyspigmentation, blistering, possible superinfection, and inability to confirm a diagnosis via histopathology), verbal and written consent were obtained and portion of the rash was biopsied for tissue sample.  See below for consent that was obtained from patient and subsequent Procedure Note.     PROCEDURE TANGENTIAL (SHAVE) BIOPSY NOTE:    Performing Physician:  Dr. Mann  Anatomic Location; Clinical Description with size (cm); Pre-Op " "Diagnosis:   A: left forearm; 0.8 cm pink hyperkeratotic papule; HAK vs. SCC  Post-op diagnosis: Same     Local anesthesia: 1% lidocaine    Topical anesthesia: None    Hemostasis: Aluminum chloride       After obtaining informed consent  at which time there was a discussion about the purpose of biopsy  and low risks of infection and bleeding.  The area was prepped and draped in the usual fashion. Anesthesia was obtained with 1% lidocaine with epinephrine. A shave biopsy to an appropriate sampling depth was obtained by Shave (Dermablade or 15 blade) The resulting wound was covered with surgical ointment and bandaged appropriately.     The patient tolerated the procedure well without complications and was without signs of functional compromise.      Specimen has been sent for review by Dermatopathology.    Standard post-procedure care has been explained and has been included in written form within the patient's copy of Informed Consent.    INFORMED CONSENT DISCUSSION AND POST-OPERATIVE INSTRUCTIONS FOR PATIENT    I.  RATIONALE FOR PROCEDURE  I understand that a skin biopsy allows the Dermatologist to test a lesion or rash under the microscope to obtain a diagnosis.  It usually involves numbing the area with numbing medication and removing a small piece of skin; sometimes the area will be closed with sutures. In this specific procedure, sutures are not usually needed.  If any sutures are placed, then they are usually need to be removed in 2 weeks or less.    I understand that my Dermatologist recommends that a skin \"shave\" biopsy be performed today.  A local anesthetic, similar to the kind that a dentist uses when filling a cavity, will be injected with a very small needle into the skin area to be sampled.  The injected skin and tissue underneath \"will go to sleep” and become numb so no pain should be felt afterwards.  An instrument shaped like a tiny \"razor blade\" (shave biopsy instrument) will be used to cut a small " "piece of tissue and skin from the area so that a sample of tissue can be taken and examined more closely under the microscope.  A slight amount of bleeding will occur, but it will be stopped with direct pressure and a pressure bandage and any other appropriate methods.  I understands that a scar will form where the wound was created.  Surgical ointment will be applied to help protect the wound.  Sutures are not usually needed.    II.  RISKS AND POTENTIAL COMPLICATIONS   I understand the risks and potential complications of a skin biopsy include but are not limited to the following:  Bleeding  Infection  Pain  Scar/keloid  Skin discoloration  Incomplete Removal  Recurrence  Nerve Damage/Numbness/Loss of Function  Allergic Reaction to Anesthesia  Biopsies are diagnostic procedures and based on findings additional treatment or evaluation may be required  Loss or destruction of specimen resulting in no additional findings    My Dermatologist has explained to me the nature of the condition, the nature of the procedure, and the benefits to be reasonably expected compared with alternative approaches.  My Dermatologist has discussed the likelihood of major risks or complications of this procedure including the specific risks listed above, such as bleeding, infection, and scarring/keloid.  I understand that a scar is expected after this procedure.  I understand that my physician cannot predict if the scar will form a \"keloid,\" which extends beyond the borders of the wound that is created.  A keloid is a thick, painful, and bumpy scar.  A keloid can be difficult to treat, as it does not always respond well to therapy, which includes injecting cortisone directly into the keloid every few weeks.  While this usually reduces the pain and size of the scar, it does not eliminate it.      I understand that photographs may be taken before and after the procedure.  These will be maintained as part of the medical providers confidential " "records and may not be made available to me.  I further authorize the medical provider to use the photographs for teaching purposes or to illustrate scientific papers, books, or lectures if in his/her judgment, medical research, education, or science may benefit from its use.    I have had an opportunity to fully inquire about the risks and benefits of this procedure and its alternatives.   I have been given ample time and opportunity to ask questions and to seek a second opinion if I wished to do so.  I acknowledge that there have specifically been no guarantees as to the cosmetic results from the procedure.  I am aware that with any procedure there is always the possibility of an unexpected complication.    III. POST-PROCEDURAL CARE (WHAT YOU WILL NEED TO DO \"AFTER THE BIOPSY\" TO OPTIMIZE HEALING)    Keep the area clean and dry.  Try NOT to remove the bandage or get it wet for the first 24 hours.    Gently clean the area and apply surgical ointment (such as Vaseline petrolatum ointment, which is available \"over the counter\" and not a prescription) to the biopsy site for up to 2 weeks straight.  This acts to protect the wound from the outside world.      Sutures are not usually placed in this procedure.  If any sutures were placed, return for suture removal as instructed (generally 1 week for the face, 2 weeks for the body).      Take Acetaminophen (Tylenol) for discomfort, if no contraindications.  Ibuprofen or aspirin could make bleeding worse.    Call our office immediately for signs of infection: fever, chills, increased redness, warmth, tenderness, discomfort/pain, or pus or foul smell coming from the wound.    WHAT TO DO IF THERE IS ANY BLEEDING?  If a small amount of bleeding is noticed, place a clean cloth over the area and apply firm pressure for ten minutes.  Check the wound after 10 minutes of direct pressure.  If bleeding persists, try one more time for an additional 10 minutes of direct pressure on the " area.  If the bleeding becomes heavier or does not stop after the second attempt, or if you have any other questions about this procedure, then please call your [facility]'s Dermatologist by calling [phone] (SKIN).     I hereby acknowledge that I have reviewed and verified the site with my Dermatologist and have requested and authorized my Dermatologist to proceed with the procedure.      Scribe Attestation      I,:  Mackenzie Arita MA am acting as a scribe while in the presence of the attending physician.:       I,:  Ryan Mann MD personally performed the services described in this documentation    as scribed in my presence.:

## 2025-01-28 ENCOUNTER — RESULTS FOLLOW-UP (OUTPATIENT)
Age: 85
End: 2025-01-28

## 2025-01-28 PROCEDURE — 88341 IMHCHEM/IMCYTCHM EA ADD ANTB: CPT | Performed by: STUDENT IN AN ORGANIZED HEALTH CARE EDUCATION/TRAINING PROGRAM

## 2025-01-28 PROCEDURE — 88305 TISSUE EXAM BY PATHOLOGIST: CPT | Performed by: STUDENT IN AN ORGANIZED HEALTH CARE EDUCATION/TRAINING PROGRAM

## 2025-01-28 PROCEDURE — 88342 IMHCHEM/IMCYTCHM 1ST ANTB: CPT | Performed by: STUDENT IN AN ORGANIZED HEALTH CARE EDUCATION/TRAINING PROGRAM

## 2025-01-28 NOTE — RESULT ENCOUNTER NOTE
[address] [facility]. Yulisa Barajas M.D.  [phone]          COLON DISCHARGE INSTRUCTIONS       2020    Madyson Koroma  :  1953  Carlos Medical Record Number:  [id number]      COLONOSCOPY FINDINGS:  Your colonoscopy showed: ulcerative proctosigmoiditis. DISCOMFORT:  Redness at IV site- apply warm compress to area; if redness or soreness persist- contact your physician  There may be a slight amount of blood passed from the rectum  Gaseous discomfort- walking, belching will help relieve any discomfort  You may not operate a vehicle for 12 hours  You may not engage in an occupation involving machinery or appliances for rest of today  You may not drink alcoholic beverages for at least 12 hours  Avoid making any critical decisions for at least 24 hour  DIET:   High fiber diet. - however -  remember your colon is empty and a heavy meal will produce gas. Avoid these foods:  vegetables, fried / greasy foods, carbonated drinks for today     ACTIVITY:  You may resume your normal daily activities it is recommended that you spend the remainder of the day resting -  avoid any strenuous activity. CALL M.D. ANY SIGN OF:   Increasing pain, nausea, vomiting  Abdominal distension (swelling)  New increased bleeding (oral or rectal)  Fever (chills)  Pain in chest area  Bloody discharge from nose or mouth   Shortness of breath    Follow-up Instructions:   Call Dr. Kenn Marie if any questions or problems. Telephone # [phone]  Biopsy results will be available in  5 to 7 days  Should have a repeat colonoscopy in 1 years. DERMATOPATHOLOGY RESULT NOTE    Results reviewed by ordering physician.  Called patient to personally discuss results. Discussed results with patient.       Instructions for Clinical Derm Team:   (remember to route Result Note to appropriate staff):    None    Result & Plan by Specimen:    Specimen A: benign  Plan: monitor. Advised to reach out to us for further evaluation should it recur        A. Skin, left forearm, shave biopsy:    Proliferative ACTINIC KERATOSIS, inflamed (see note).    Note: If the lesion were to progress or persist, additional sampling should be sought.    Electronically signed by Shilpa Rivas MD on 1/28/2025 at 0956 EST

## 2025-02-21 ENCOUNTER — OFFICE VISIT (OUTPATIENT)
Dept: CARDIOLOGY CLINIC | Facility: CLINIC | Age: 85
End: 2025-02-21
Payer: COMMERCIAL

## 2025-02-21 VITALS
WEIGHT: 183 LBS | HEART RATE: 73 BPM | BODY MASS INDEX: 30.45 KG/M2 | DIASTOLIC BLOOD PRESSURE: 70 MMHG | OXYGEN SATURATION: 98 % | SYSTOLIC BLOOD PRESSURE: 128 MMHG

## 2025-02-21 DIAGNOSIS — I10 BENIGN ESSENTIAL HYPERTENSION: ICD-10-CM

## 2025-02-21 DIAGNOSIS — I48.0 PAROXYSMAL ATRIAL FIBRILLATION (HCC): Primary | ICD-10-CM

## 2025-02-21 PROCEDURE — 99213 OFFICE O/P EST LOW 20 MIN: CPT | Performed by: INTERNAL MEDICINE

## 2025-02-21 PROCEDURE — 93000 ELECTROCARDIOGRAM COMPLETE: CPT | Performed by: INTERNAL MEDICINE

## 2025-02-21 NOTE — PROGRESS NOTES
"                                           Cardiology Follow Up    Peg Garrett  1940  [id number]  [facility] CARDIAC CATH LAB  [address] [address]  [phone]    1. Paroxysmal atrial fibrillation (HCC)        2. Benign essential hypertension            Interval History: Cardiology follow-up.  Patient continues to be stable from a cardiac point of view.  No significant palpitations.  Denies any syncope or presyncope.  Denies any focal neurological deficits.  Patient had sodium chest discomfort abdominal from sleep.  Results spontaneously, she thinks is related to gas.  This happened about a week ago, no recurrences compliant with low-sodium diet, blood pressure has been well-controlled.  No bleeding issues on chronic aspirin therapy.  Lipids last checked on cholesterol 172, HDL 65, LDL 93.    Patient Active Problem List   Diagnosis    Atrial fibrillation (HCC)    Benign essential hypertension    Hypothyroidism    Preoperative clearance    Age-related cataract of both eyes    Preop examination    Age-related cataract    Essential hypertension    Meniere's disease    Oral candida    Oral ulcer    Needs flu shot    Snoring    Hematoma and contusion    Hematoma of left lower leg    Vitamin D deficiency    IFG (impaired fasting glucose)    PTSD (post-traumatic stress disorder)    Hip pain    Intentional tremor    Class 1 obesity due to excess calories with body mass index (BMI) of 30.0 to 30.9 in adult    Acute pain of right shoulder    Rotator cuff dysfunction, right    Rotator cuff arthropathy of right shoulder     Past Medical History:   Diagnosis Date    Allergic 1955    Arthritis     \"Everywhere\"OA    Atrial fibrillation (HCC)     Cancer (HCC) 4/2019    basil cell on nose and leg    Headache(784.0) 1960's    no longer have migraines    HL (hearing loss)     Hypokalemia     last assessed: 4/25/2015    Meniere's disease     Dx 40 yrs ago per pt.    Pneumonia " approx.     Squamous cell skin cancer     (SCCIS)    Visual impairment 2019    taking drops     Social History     Socioeconomic History    Marital status: /Civil Union     Spouse name: Not on file    Number of children: Not on file    Years of education: Not on file    Highest education level: Not on file   Occupational History    Not on file   Tobacco Use    Smoking status: Former     Current packs/day: 0.00     Types: Cigarettes     Quit date:      Years since quittin.1    Smokeless tobacco: Never    Tobacco comments:     Never smoked when pregnant   Vaping Use    Vaping status: Never Used   Substance and Sexual Activity    Alcohol use: No     Comment: never drank alcohol    Drug use: No    Sexual activity: Not Currently   Other Topics Concern    Not on file   Social History Narrative    Not on file     Social Drivers of Health     Financial Resource Strain: Low Risk  (2023)    Overall Financial Resource Strain (CARDIA)     Difficulty of Paying Living Expenses: Not hard at all   Food Insecurity: No Food Insecurity (2024)    Hunger Vital Sign     Worried About Running Out of Food in the Last Year: Never true     Ran Out of Food in the Last Year: Never true   Transportation Needs: No Transportation Needs (2024)    PRAPARE - Transportation     Lack of Transportation (Medical): No     Lack of Transportation (Non-Medical): No   Physical Activity: Not on file   Stress: Not on file   Social Connections: Not on file   Intimate Partner Violence: Not on file   Housing Stability: Unknown (2024)    Housing Stability Vital Sign     Unable to Pay for Housing in the Last Year: No     Number of Times Moved in the Last Year: Not on file     Homeless in the Last Year: No      Family History   Problem Relation Age of Onset    Dementia Mother             Colon cancer Father 85            No Known Problems Sister     No Known Problems Daughter     No Known Problems  Daughter     No Known Problems Maternal Grandmother     No Known Problems Maternal Grandfather     No Known Problems Paternal Grandmother     No Known Problems Paternal Grandfather     No Known Problems Maternal Aunt     No Known Problems Paternal Aunt     No Known Problems Paternal Aunt     No Known Problems Paternal Aunt     Brain cancer Half-Sister 40         at 48     Past Surgical History:   Procedure Laterality Date    EYE SURGERY      cataracts removed    HYSTERECTOMY      partial  age 29       Current Outpatient Medications:     Acetaminophen 650 MG/20.3ML SOLN, Take 650 mg by mouth every 4 (four) hours as needed , Disp: , Rfl:     Ascorbic Acid (VITAMIN C) 500 MG CAPS, Take by mouth, Disp: , Rfl:     aspirin 81 MG tablet, Take 1 tablet by mouth daily, Disp: , Rfl:     Biotin 300 MCG TABS, Take by mouth, Disp: , Rfl:     Calcium Carb-Cholecalciferol (CALTRATE 600+D) 600-800 MG-UNIT TABS, Take by mouth, Disp: , Rfl:     Cholecalciferol (VITAMIN D-3) 1000 units CAPS, Take by mouth, Disp: , Rfl:     Glucosamine 750 MG TABS, Take by mouth, Disp: , Rfl:     levothyroxine 88 mcg tablet, TAKE ONE TABLET BY MOUTH EVERY DAY, Disp: 90 tablet, Rfl: 1    Loratadine 10 MG CAPS, Take by mouth daily, Disp: , Rfl:     metoprolol tartrate (LOPRESSOR) 25 mg tablet, TAKE ONE TABLET BY MOUTH EVERY 12 HOURS, Disp: 180 tablet, Rfl: 1    Multiple Vitamins-Minerals (CENTRUM SILVER ULTRA WOMENS) TABS, Take by mouth, Disp: , Rfl:     Xiidra 5 % op solution, Administer 1 drop to both eyes 2 (two) times a day, Disp: , Rfl:     acetaminophen-codeine (TYLENOL with CODEINE #3) 300-30 MG per tablet, Take 1-2 tablets by mouth every 6 (six) hours as needed, Disp: , Rfl:     neomycin-polymyxin-dexamethasone (MAXITROL) 0.35%-10,000 units/g-0.1%, 3 (three) times a day, Disp: , Rfl:     NON FORMULARY, Omega XL 4 tabs daily (Patient taking differently: Apply 2 tablets to the mouth or throat daily Omega XL 4 tabs daily), Disp: , Rfl:      Semaglutide-Weight Management (Wegovy) 0.25 MG/0.5ML, Inject 0.25 mg under the skin weekly, Disp: 2 mL, Rfl: 0  Allergies   Allergen Reactions    Dust Mite Extract Allergic Rhinitis    Fluticasone-Salmeterol Hives     Category: Allergy;     Molds & Smuts Allergic Rhinitis    Morphine Nausea Only    Penicillins Hives     Category: Allergy;     Pollen Extract Allergic Rhinitis, Cough, Dermatitis and Itching    Latex Rash       Labs:  Office Visit on 01/23/2025   Component Date Value    Case Report 01/23/2025                      Value:Surgical Pathology Report                         Case: [id number]                                  Authorizing Provider:  Ryan Mann MD     Collected:           01/23/2025 [id number]              Ordering Location:     [facility]      Received:            01/23/2025 [address] [place] [address]                                                                    Pathologist:           Shilpa Rivas MD                                                           Specimen:    Skin, Other, A: left forearm                                                               Addendum 01/23/2025                      Value:SOX10 and p16 immunostains were reviewed and support the diagnosis. The final diagnosis remains unchanged.      Final Diagnosis 01/23/2025                      Value:A. Skin, left forearm, shave biopsy:    Proliferative ACTINIC KERATOSIS, inflamed (see note).    Note: If the lesion were to progress or persist, additional sampling should be sought.         Additional Information 01/23/2025                      Value:All reported additional testing was performed with appropriately reactive controls.  These tests were developed and their performance characteristics determined by [facility] or appropriate performing facility, though some tests may be performed on tissues which have not been validated for performance characteristics  "(such as staining performed on alcohol exposed cell blocks and decalcified tissues).  Results should be interpreted with caution and in the context of the patients’ clinical condition. These tests may not be cleared or approved by the U.S. Food and Drug Administration, though the FDA has determined that such clearance or approval is not necessary. These tests are used for clinical purposes and they should not be regarded as investigational or for research. This laboratory has been approved by [facility], designated as a high-complexity laboratory and is qualified to perform these tests.  .      Gross Description 01/23/2025                      Value:A. The specimen is received in formalin, labeled with the patient's name and hospital number, and is designated \" left forearm\".  The specimen consists of a tan superficial shaving of skin measuring 1 x 0.9 x 0.1 cm.  The skin surface exhibits a pale-tan to white keratotic papule measuring 0.7 x 0.5 x 0.2 cm.  The margin is inked green.  The skin surface is inked red.  The specimen is trisected.  Entirely submitted. One cassette.  Between sponges.    Note: The estimated total formalin fixation time based upon information provided by the submitting clinician and the standard processing schedule is under 72 hours.    [facility]      Clinical Information 01/23/2025                      Value:ATTENTION:  DERMPATH GROUP    SPECIMEN A; Skin; Anatomic Location: A: left forearm; Procedure/Protocol: Skin Specimen (submit in FORMALIN):Tangential Biopsy (includes shave, scoop, saucerization, curette) (CPT 98209; each additional tangential biopsy is CPT 19335)   84 y.o. year old  Female with a Morphological Description: 0.8 cm pink hyperkeratotic papule  Differential Diagnosis and/or Specific Clinical Question: HAK vs. SCC   Appointment on 11/18/2024   Component Date Value    Sodium 11/18/2024 139     Potassium 11/18/2024 4.3     Chloride 11/18/2024 104     CO2 11/18/2024 29     ANION GAP " 11/18/2024 6     BUN 11/18/2024 16     Creatinine 11/18/2024 0.68     Glucose, Fasting 11/18/2024 90     Calcium 11/18/2024 9.0     AST 11/18/2024 24     ALT 11/18/2024 16     Alkaline Phosphatase 11/18/2024 81     Total Protein 11/18/2024 6.4     Albumin 11/18/2024 4.0     Total Bilirubin 11/18/2024 0.57     eGFR 11/18/2024 79     Hemoglobin A1C 11/18/2024 5.7 (H)     EAG 11/18/2024 117     Cholesterol 11/18/2024 172     Triglycerides 11/18/2024 72     HDL, Direct 11/18/2024 65     LDL Calculated 11/18/2024 93     TSH 3RD GENERATON 11/18/2024 1.046      Imaging: No results found.    Review of Systems:  Review of Systems   Constitutional:  Negative for fatigue.   HENT:  Negative for nosebleeds.    Respiratory:  Negative for apnea, shortness of breath, wheezing and stridor.    Cardiovascular:  Positive for chest pain and palpitations. Negative for leg swelling.   Gastrointestinal:  Positive for abdominal pain. Negative for anal bleeding and blood in stool.   Genitourinary:  Negative for hematuria.   Neurological:  Negative for dizziness, syncope, facial asymmetry, speech difficulty and weakness.   Hematological:  Does not bruise/bleed easily.       Physical Exam:  Physical Exam  Vitals reviewed.   Constitutional:       General: She is not in acute distress.     Appearance: Normal appearance. She is not ill-appearing, toxic-appearing or diaphoretic.   Neck:      Vascular: No carotid bruit.   Cardiovascular:      Rate and Rhythm: Normal rate and regular rhythm.      Pulses: Normal pulses.      Heart sounds: Normal heart sounds. No murmur heard.     No friction rub. No gallop.   Pulmonary:      Effort: Pulmonary effort is normal. No respiratory distress.      Breath sounds: Normal breath sounds. No stridor. No wheezing, rhonchi or rales.   Musculoskeletal:      Right lower leg: No edema.      Left lower leg: No edema.   Skin:     General: Skin is warm and dry.   Neurological:      Mental Status: She is alert.    Psychiatric:         Mood and Affect: Mood normal.         Discussion/Summary:   Paroxysmal atrial fibrillation, 1st noted in 2014 in the setting of admission with pneumonia.  She has remained clinically and electrocardiographically in sinus rhythm over the years.  Currently only on aspirin  And beta-blocker, stress test  2016 suggested no ischemia, she did 6 minutes of Evan protocol, echocardiographic portion the suggested no ischemia left systolic function is normal.   2 week monitor 2022 revealed normal sinus rhythm, short bursts of SVT were noted and symptomatic PVCs as well, there was no atrial fibrillation.  Continue current medication,  Holter monitor 2024, normal sinus rhythm.  No tachyarrhythmias.  Will order stress test and echocardiogram.  Patient thinks that she can walk on treadmill.  As well as a Holter.          This note was completed in part utilizing [employer] direct voice recognition software.   Grammatical errors, random word insertion, spelling mistakes, and incomplete sentences may be an occasional consequence of the system secondary to software limitations, ambient noise and hardware issues. At the time of dictation, efforts were made to edit, clarify and /or correct errors.  Please read the chart carefully and recognize, using context, where substitutions have occurred.  If you have any questions or concerns about the context, text or information contained within the body of this dictation, please contact myself, the provider, for further clarification.

## 2025-03-03 ENCOUNTER — HOSPITAL ENCOUNTER (OUTPATIENT)
Dept: NON INVASIVE DIAGNOSTICS | Facility: CLINIC | Age: 85
Discharge: HOME | End: 2025-03-03
Payer: COMMERCIAL

## 2025-03-03 DIAGNOSIS — I10 BENIGN ESSENTIAL HYPERTENSION: ICD-10-CM

## 2025-03-03 DIAGNOSIS — I48.0 PAROXYSMAL ATRIAL FIBRILLATION (HCC): ICD-10-CM

## 2025-03-03 PROCEDURE — 93226 XTRNL ECG REC<48 HR SCAN A/R: CPT

## 2025-03-03 PROCEDURE — 93225 XTRNL ECG REC<48 HRS REC: CPT

## 2025-03-19 ENCOUNTER — HOSPITAL ENCOUNTER (OUTPATIENT)
Dept: NON INVASIVE DIAGNOSTICS | Facility: CLINIC | Age: 85
Discharge: HOME | End: 2025-03-19
Payer: COMMERCIAL

## 2025-03-19 VITALS
SYSTOLIC BLOOD PRESSURE: 128 MMHG | WEIGHT: 183 LBS | DIASTOLIC BLOOD PRESSURE: 70 MMHG | BODY MASS INDEX: 30.49 KG/M2 | HEART RATE: 62 BPM | HEIGHT: 65 IN

## 2025-03-19 DIAGNOSIS — I10 BENIGN ESSENTIAL HYPERTENSION: ICD-10-CM

## 2025-03-19 DIAGNOSIS — I48.0 PAROXYSMAL ATRIAL FIBRILLATION (HCC): ICD-10-CM

## 2025-03-19 LAB
AORTIC ROOT: 2.7 CM
ASCENDING AORTA: 3 CM
AV LVOT PEAK GRADIENT: 2 MMHG
AV PEAK GRADIENT: 5 MMHG
BSA FOR ECHO PROCEDURE: 1.9 M2
CHEST PAIN STATEMENT: NORMAL
DOP CALC LVOT AREA: 3.46 CM2
DOP CALC LVOT DIAMETER: 2.1 CM
DOP CALC MV VTI: 38.58 CM
E WAVE DECELERATION TIME: 199 MS
E/A RATIO: 2.36
FRACTIONAL SHORTENING: 34 (ref 28–44)
INTERVENTRICULAR SEPTUM IN DIASTOLE (PARASTERNAL SHORT AXIS VIEW): 0.9 CM
INTERVENTRICULAR SEPTUM: 0.9 CM (ref 0.6–1.1)
LAAS-AP2: 21 CM2
LAAS-AP4: 18.3 CM2
LEFT ATRIUM SIZE: 4.1 CM
LEFT ATRIUM VOLUME (MOD BIPLANE): 53 ML
LEFT ATRIUM VOLUME INDEX (MOD BIPLANE): 27.9 ML/M2
LEFT INTERNAL DIMENSION IN SYSTOLE: 3.3 CM (ref 2.1–4)
LEFT VENTRICULAR INTERNAL DIMENSION IN DIASTOLE: 5 CM (ref 3.5–6)
LEFT VENTRICULAR POSTERIOR WALL IN END DIASTOLE: 0.9 CM
LEFT VENTRICULAR STROKE VOLUME: 71 ML
LV EF US.2D.A4C+ESTIMATED: 65 %
LVSV (TEICH): 71 ML
MAX DIASTOLIC BP: 76 MMHG
MAX HR PERCENT: 89 %
MAX HR: 120 BPM
MAX PREDICTED HEART RATE: 134 BPM
MV E'TISSUE VEL-SEP: 10 CM/S
MV MEAN GRADIENT: 1 MMHG
MV PEAK A VEL: 0.45 M/S
MV PEAK E VEL: 106 CM/S
MV PEAK GRADIENT: 6 MMHG
MV STENOSIS PRESSURE HALF TIME: 58 MS
MV VALVE AREA P 1/2 METHOD: 3.79
PROTOCOL NAME: NORMAL
RATE PRESSURE PRODUCT: NORMAL
REASON FOR TERMINATION: NORMAL
RIGHT ATRIUM AREA SYSTOLE A4C: 16.5 CM2
RIGHT VENTRICLE ID DIMENSION: 3.3 CM
SL CV LEFT ATRIUM LENGTH A2C: 5.9 CM
SL CV LV EF: 65
SL CV PED ECHO LEFT VENTRICLE DIASTOLIC VOLUME (MOD BIPLANE) 2D: 116 ML
SL CV PED ECHO LEFT VENTRICLE SYSTOLIC VOLUME (MOD BIPLANE) 2D: 45 ML
SL CV STRESS RECOVERY BP: NORMAL MMHG
SL CV STRESS RECOVERY HR: 76 BPM
SL CV STRESS RECOVERY O2 SAT: 99 %
SL CV STRESS STAGE REACHED: 2
STRESS ANGINA INDEX: 0
STRESS BASELINE BP: NORMAL MMHG
STRESS BASELINE HR: 65 BPM
STRESS O2 SAT REST: 99 %
STRESS PEAK HR: 120 BPM
STRESS POST ESTIMATED WORKLOAD: 7 METS
STRESS POST EXERCISE DUR MIN: 4 MIN
STRESS POST EXERCISE DUR MIN: 4 MIN
STRESS POST EXERCISE DUR SEC: 0 SEC
STRESS POST EXERCISE DUR SEC: 0 SEC
STRESS POST O2 SAT PEAK: 99 %
STRESS POST PEAK BP: 168 MMHG
STRESS POST PEAK HR: 120 BPM
STRESS POST PEAK SYSTOLIC BP: 168 MMHG
TARGET HR FORMULA: NORMAL
TEST INDICATION: NORMAL
TRICUSPID ANNULAR PLANE SYSTOLIC EXCURSION: 2.7 CM

## 2025-03-19 PROCEDURE — 93017 CV STRESS TEST TRACING ONLY: CPT

## 2025-03-19 PROCEDURE — 93306 TTE W/DOPPLER COMPLETE: CPT

## 2025-03-19 PROCEDURE — 93016 CV STRESS TEST SUPVJ ONLY: CPT | Performed by: INTERNAL MEDICINE

## 2025-03-19 PROCEDURE — 93306 TTE W/DOPPLER COMPLETE: CPT | Performed by: INTERNAL MEDICINE

## 2025-03-19 PROCEDURE — 93018 CV STRESS TEST I&R ONLY: CPT | Performed by: INTERNAL MEDICINE

## 2025-05-16 ENCOUNTER — HOSPITAL ENCOUNTER (OUTPATIENT)
Dept: RADIOLOGY | Age: 85
Discharge: HOME | End: 2025-05-16
Payer: COMMERCIAL

## 2025-05-16 VITALS — HEIGHT: 65 IN | WEIGHT: 183 LBS | BODY MASS INDEX: 30.49 KG/M2

## 2025-05-16 DIAGNOSIS — Z12.31 VISIT FOR SCREENING MAMMOGRAM: ICD-10-CM

## 2025-05-16 PROCEDURE — 77063 BREAST TOMOSYNTHESIS BI: CPT

## 2025-05-16 PROCEDURE — 77067 SCR MAMMO BI INCL CAD: CPT

## 2025-05-18 ENCOUNTER — RESULTS FOLLOW-UP (OUTPATIENT)
Dept: INTERNAL MEDICINE CLINIC | Facility: CLINIC | Age: 85
End: 2025-05-18

## 2025-05-27 ENCOUNTER — APPOINTMENT (OUTPATIENT)
Dept: LAB | Age: 85
End: 2025-05-27
Attending: INTERNAL MEDICINE
Payer: COMMERCIAL

## 2025-05-27 DIAGNOSIS — R73.03 PREDIABETES: ICD-10-CM

## 2025-05-27 DIAGNOSIS — Z13.6 SCREENING FOR CARDIOVASCULAR CONDITION: ICD-10-CM

## 2025-05-27 DIAGNOSIS — E03.9 HYPOTHYROIDISM, UNSPECIFIED TYPE: ICD-10-CM

## 2025-05-27 LAB
ALBUMIN SERPL BCG-MCNC: 4.3 G/DL (ref 3.5–5)
ALP SERPL-CCNC: 86 U/L (ref 34–104)
ALT SERPL W P-5'-P-CCNC: 20 U/L (ref 7–52)
ANION GAP SERPL CALCULATED.3IONS-SCNC: 8 MMOL/L (ref 4–13)
AST SERPL W P-5'-P-CCNC: 28 U/L (ref 13–39)
BILIRUB SERPL-MCNC: 0.67 MG/DL (ref 0.2–1)
BUN SERPL-MCNC: 14 MG/DL (ref 5–25)
CALCIUM SERPL-MCNC: 9.6 MG/DL (ref 8.4–10.2)
CHLORIDE SERPL-SCNC: 103 MMOL/L (ref 96–108)
CHOLEST SERPL-MCNC: 174 MG/DL (ref ?–200)
CO2 SERPL-SCNC: 30 MMOL/L (ref 21–32)
CREAT SERPL-MCNC: 0.64 MG/DL (ref 0.6–1.3)
GFR SERPL CREATININE-BSD FRML MDRD: 81 ML/MIN/1.73SQ M
GLUCOSE P FAST SERPL-MCNC: 97 MG/DL (ref 65–99)
HDLC SERPL-MCNC: 66 MG/DL
LDLC SERPL CALC-MCNC: 84 MG/DL (ref 0–100)
POTASSIUM SERPL-SCNC: 4.3 MMOL/L (ref 3.5–5.3)
PROT SERPL-MCNC: 7.1 G/DL (ref 6.4–8.4)
SODIUM SERPL-SCNC: 141 MMOL/L (ref 135–147)
TRIGL SERPL-MCNC: 122 MG/DL (ref ?–150)
TSH SERPL DL<=0.05 MIU/L-ACNC: 2.1 UIU/ML (ref 0.45–4.5)

## 2025-05-27 PROCEDURE — 80061 LIPID PANEL: CPT

## 2025-05-27 PROCEDURE — 36415 COLL VENOUS BLD VENIPUNCTURE: CPT

## 2025-05-27 PROCEDURE — 80053 COMPREHEN METABOLIC PANEL: CPT

## 2025-05-27 PROCEDURE — 84443 ASSAY THYROID STIM HORMONE: CPT

## 2025-05-27 PROCEDURE — 83036 HEMOGLOBIN GLYCOSYLATED A1C: CPT

## 2025-05-28 LAB
EST. AVERAGE GLUCOSE BLD GHB EST-MCNC: 123 MG/DL
HBA1C MFR BLD: 5.9 %

## 2025-05-29 ENCOUNTER — RA CDI HCC (OUTPATIENT)
Dept: OTHER | Facility: HOSPITAL | Age: 85
End: 2025-05-29

## 2025-05-30 RX ORDER — CARBOXYMETHYLCELLULOSE SODIUM, GLYCERIN AND POLYSORBATE 80 5; 10; 5 MG/ML; MG/ML; MG/ML
SOLUTION/ DROPS OPHTHALMIC
COMMUNITY
Start: 2025-04-22

## 2025-06-03 ENCOUNTER — OFFICE VISIT (OUTPATIENT)
Dept: INTERNAL MEDICINE CLINIC | Facility: CLINIC | Age: 85
End: 2025-06-03
Payer: COMMERCIAL

## 2025-06-03 VITALS
OXYGEN SATURATION: 97 % | DIASTOLIC BLOOD PRESSURE: 72 MMHG | BODY MASS INDEX: 30.49 KG/M2 | WEIGHT: 183 LBS | SYSTOLIC BLOOD PRESSURE: 126 MMHG | HEIGHT: 65 IN | HEART RATE: 68 BPM

## 2025-06-03 DIAGNOSIS — R73.01 IFG (IMPAIRED FASTING GLUCOSE): ICD-10-CM

## 2025-06-03 DIAGNOSIS — Z13.6 SCREENING FOR CARDIOVASCULAR CONDITION: ICD-10-CM

## 2025-06-03 DIAGNOSIS — E66.811 CLASS 1 OBESITY DUE TO EXCESS CALORIES WITHOUT SERIOUS COMORBIDITY WITH BODY MASS INDEX (BMI) OF 30.0 TO 30.9 IN ADULT: ICD-10-CM

## 2025-06-03 DIAGNOSIS — M75.01 ADHESIVE CAPSULITIS OF RIGHT SHOULDER: ICD-10-CM

## 2025-06-03 DIAGNOSIS — E03.9 HYPOTHYROIDISM, UNSPECIFIED TYPE: Primary | ICD-10-CM

## 2025-06-03 DIAGNOSIS — I10 BENIGN ESSENTIAL HYPERTENSION: ICD-10-CM

## 2025-06-03 DIAGNOSIS — E66.09 CLASS 1 OBESITY DUE TO EXCESS CALORIES WITHOUT SERIOUS COMORBIDITY WITH BODY MASS INDEX (BMI) OF 30.0 TO 30.9 IN ADULT: ICD-10-CM

## 2025-06-03 DIAGNOSIS — M12.811 ROTATOR CUFF ARTHROPATHY OF RIGHT SHOULDER: ICD-10-CM

## 2025-06-03 PROCEDURE — G2211 COMPLEX E/M VISIT ADD ON: HCPCS | Performed by: INTERNAL MEDICINE

## 2025-06-03 PROCEDURE — 99214 OFFICE O/P EST MOD 30 MIN: CPT | Performed by: INTERNAL MEDICINE

## 2025-06-03 RX ORDER — LIDOCAINE HYDROCHLORIDE 20 MG/ML
JELLY TOPICAL 2 TIMES DAILY PRN
Qty: 15 ML | Refills: 0 | Status: SHIPPED | OUTPATIENT
Start: 2025-06-03

## 2025-06-03 NOTE — ASSESSMENT & PLAN NOTE
Obesity -I have counseled patient following healthy and balanced diet, I would like the patient to lose weight, I would like the patient exercise routinely; we will continue monitor the patient's progress.

## 2025-06-03 NOTE — ASSESSMENT & PLAN NOTE
Will have patient see orthopedics Dr. Dozier for steroid injection may use topical lidocaine twice daily as needed, Tylenol 500 mg 2 tablets at bedtime I suspect the patient also has a frozen shoulder  Orders:  •  Ambulatory Referral to Orthopedic Surgery; Future  •  lidocaine (XYLOCAINE) 2 % topical gel; Apply topically 2 (two) times a day as needed for mild pain

## 2025-06-03 NOTE — ASSESSMENT & PLAN NOTE
Hypertension - controlled, I have counseled patient following healthy balance diet, I would like the patient reduce sodium, exercise routinely, I would like the patient continued the med current medical regiment and we will continue to monitor.  Continue Lopressor

## 2025-06-03 NOTE — PROGRESS NOTES
Name: Peg Garrett      : 1940      MRN: [id number]  Encounter Provider: Daniel Loo DO  Encounter Date: 6/3/2025   [facility]  :  Assessment & Plan  Hypothyroidism, unspecified type  Hypothyroidism controlled the patient is currently euthyroid I will be ordering a TSH prior to the next office visit and the patient will continue with current medical regiment; we will continue to monitor the patient's progress.  Orders:  •  TSH, 3rd generation; Future    IFG (impaired fasting glucose)  Pre diabetes -I have counseled the patient to follow a healthy balanced diet, I have counseled patient reduce carbohydrates and sweets in the diet, I would like the patient exercise routinely.  I will be checking hemoglobin A1c and comprehensive metabolic panel.  Have counseled patient about the prevention of diabetes, and the risk of progression to type 2 diabetes.  Orders:  •  Comprehensive metabolic panel; Future  •  Hemoglobin A1C; Future    Class 1 obesity due to excess calories without serious comorbidity with body mass index (BMI) of 30.0 to 30.9 in adult  Obesity -I have counseled patient following healthy and balanced diet, I would like the patient to lose weight, I would like the patient exercise routinely; we will continue monitor the patient's progress.         Benign essential hypertension  Hypertension - controlled, I have counseled patient following healthy balance diet, I would like the patient reduce sodium, exercise routinely, I would like the patient continued the med current medical regiment and we will continue to monitor.  Continue Lopressor       Rotator cuff arthropathy of right shoulder  Will have patient see orthopedics Dr. Dozier for steroid injection may use topical lidocaine twice daily as needed, Tylenol 500 mg 2 tablets at bedtime I suspect the patient also has a frozen shoulder  Orders:  •  Ambulatory Referral to Orthopedic Surgery; Future  •   lidocaine (XYLOCAINE) 2 % topical gel; Apply topically 2 (two) times a day as needed for mild pain    Adhesive capsulitis of right shoulder  Start physical therapy after steroid injection from orthopedics pendulum exercises  Orders:  •  Ambulatory Referral to Physical Therapy; Future    Screening for cardiovascular condition  I have counselled the pt to follow a healthy and balanced diet ,and recommend routine exercise.  Orders:  •  Lipid Panel with Direct LDL reflex; Future    Return to office  6 months  call if any problems       History of Present Illness   HPI 84-year old female coming in for a follow up office visit regarding hypothyroidism, impaired fasting glucose, class I obesity, hypertension, rotator cuff arthropathy/frozen shoulder; the patient reports me compliant taking medications without untoward side effects the.  The patient is here to review his medical condition, update me on the medical condition and the patient reports me no hospitalizations and no ER visits.  No injuries no illnesses reports to me she has been having difficulties with the right shoulder pain started after fall ,  worse with reaching out left arm black and blue sometime  pain and numbness going down the arm into the finger she has limited range of motion of the right shoulder.  No recent reinjury.  Right follow-up and balanced diet remains active here to review laboratories in detail.  Review of Systems   Constitutional:  Negative for activity change, appetite change and unexpected weight change.   HENT:  Negative for congestion and postnasal drip.    Eyes:  Negative for visual disturbance.   Respiratory:  Negative for cough and shortness of breath.    Cardiovascular:  Negative for chest pain.   Gastrointestinal:  Negative for abdominal pain, diarrhea, nausea and vomiting.   Musculoskeletal:         Right shoulder pain/limited range of motion   Neurological:  Negative for dizziness, light-headedness and headaches.  "  Hematological:  Negative for adenopathy.       Objective   /72 (BP Location: Left arm, Patient Position: Sitting, Cuff Size: Standard)   Pulse 68   Ht 5' 5\" (1.651 m)   Wt 83 kg (183 lb)   SpO2 97%   BMI 30.45 kg/m²   Examination right shoulder limited internal/external rotation abduction pain with internal/external rotation  Physical Exam  Vitals and nursing note reviewed.   Constitutional:       General: She is not in acute distress.     Appearance: Normal appearance. She is well-developed. She is obese. She is not ill-appearing, toxic-appearing or diaphoretic.   HENT:      Head: Normocephalic and atraumatic.      Right Ear: External ear normal.      Left Ear: External ear normal.      Nose: Nose normal.     Eyes:      Pupils: Pupils are equal, round, and reactive to light.       Cardiovascular:      Rate and Rhythm: Normal rate and regular rhythm.      Heart sounds: Normal heart sounds. No murmur heard.  Pulmonary:      Effort: Pulmonary effort is normal.      Breath sounds: Normal breath sounds.   Abdominal:      General: There is no distension.      Palpations: Abdomen is soft.      Tenderness: There is no abdominal tenderness. There is no guarding.     Neurological:      Mental Status: She is alert.     Negative edema, distal pulses 2/2  Administrative Statements   I have spent a total time of 30 minutes in caring for this patient on the day of the visit/encounter including Diagnostic results, Instructions for management, Risk factor reductions, Impressions, Counseling / Coordination of care, Documenting in the medical record, Reviewing/placing orders in the medical record (including tests, medications, and/or procedures), and Obtaining or reviewing history  .  "

## 2025-06-05 ENCOUNTER — OFFICE VISIT (OUTPATIENT)
Dept: OBGYN CLINIC | Facility: OTHER | Age: 85
End: 2025-06-05
Payer: COMMERCIAL

## 2025-06-05 VITALS — HEIGHT: 65 IN | BODY MASS INDEX: 31.16 KG/M2 | WEIGHT: 187 LBS

## 2025-06-05 DIAGNOSIS — M12.811 ROTATOR CUFF ARTHROPATHY OF RIGHT SHOULDER: Primary | ICD-10-CM

## 2025-06-05 DIAGNOSIS — M48.02 CERVICAL SPINAL STENOSIS: ICD-10-CM

## 2025-06-05 PROCEDURE — 99214 OFFICE O/P EST MOD 30 MIN: CPT | Performed by: ORTHOPAEDIC SURGERY

## 2025-06-05 PROCEDURE — 20610 DRAIN/INJ JOINT/BURSA W/O US: CPT | Performed by: ORTHOPAEDIC SURGERY

## 2025-06-05 RX ORDER — BUPIVACAINE HYDROCHLORIDE 2.5 MG/ML
2 INJECTION, SOLUTION INFILTRATION; PERINEURAL
Status: COMPLETED | OUTPATIENT
Start: 2025-06-05 | End: 2025-06-05

## 2025-06-05 RX ORDER — BETAMETHASONE SODIUM PHOSPHATE AND BETAMETHASONE ACETATE 3; 3 MG/ML; MG/ML
6 INJECTION, SUSPENSION INTRA-ARTICULAR; INTRALESIONAL; INTRAMUSCULAR; SOFT TISSUE
Status: COMPLETED | OUTPATIENT
Start: 2025-06-05 | End: 2025-06-05

## 2025-06-05 RX ADMIN — BETAMETHASONE SODIUM PHOSPHATE AND BETAMETHASONE ACETATE 6 MG: 3; 3 INJECTION, SUSPENSION INTRA-ARTICULAR; INTRALESIONAL; INTRAMUSCULAR; SOFT TISSUE at 13:15

## 2025-06-05 RX ADMIN — BUPIVACAINE HYDROCHLORIDE 2 ML: 2.5 INJECTION, SOLUTION INFILTRATION; PERINEURAL at 13:15

## 2025-06-05 NOTE — ASSESSMENT & PLAN NOTE
Patient also reports a hx of cervical stenosis. We can consider referral to Dr. Swift who helped her with this problem in the past.

## 2025-06-05 NOTE — ASSESSMENT & PLAN NOTE
The patient has an examination consistent with right shoulder rotator cuff tear arthropathy.  I have discussed with the patient the pathophysiology of this diagnosis and reviewed how the examination correlates with this diagnosis.  Surgical vs conservative treatment options were discussed at length and after discussing these treatment options, the patient elected for a CS injection today.  Injection can be repeated in 4-6 mos if needed.  Explained the definitive treatment would be a reverse total shoulder arthoplasty.      Patient also reports a hx of cervical stenosis.  If injection fails to provide relief we can consider referral to Dr. Switf.   Orders:    Large joint arthrocentesis: R glenohumeral    Ambulatory Referral to Physical Therapy; Future

## 2025-06-05 NOTE — PROGRESS NOTES
"  Assessment & Plan  Rotator cuff arthropathy of right shoulder  The patient has an examination consistent with right shoulder rotator cuff tear arthropathy.  I have discussed with the patient the pathophysiology of this diagnosis and reviewed how the examination correlates with this diagnosis.  Surgical vs conservative treatment options were discussed at length and after discussing these treatment options, the patient elected for a CS injection today.  Injection can be repeated in 4-6 mos if needed.  Explained the definitive treatment would be a reverse total shoulder arthoplasty.      Patient also reports a hx of cervical stenosis.  If injection fails to provide relief we can consider referral to Dr. Swift.   Orders:    Large joint arthrocentesis: R glenohumeral    Ambulatory Referral to Physical Therapy; Future    Cervical spinal stenosis  Patient also reports a hx of cervical stenosis. We can consider referral to Dr. Swift who helped her with this problem in the past.          Subjective:   Patient ID: Peg Garrett is a 84 y.o. female      HPI  Patient presents today for follow up regarding right shoulder pain.  Patient has known rotator cuff tear arthropathy.  She was not provided with a CS injection at her last visit as symptoms are tolerable.  Today she reports global shoulder pain with radicular symptoms radiating to the hand and wrist as well proximally to the cervical spine.  Patient reports a hx of cervical stenosis and is wondering if this is contributing to her pain symptoms.       The following portions of the patient's history were reviewed and updated as appropriate: allergies, current medications, past family history, past medical history, past social history, past surgical history and problem list.      Objective:  Ht 5' 5\" (1.651 m)   Wt 84.8 kg (187 lb)   BMI 31.12 kg/m²       Right Shoulder Exam     Tenderness   The patient is experiencing no tenderness.    Range of Motion   External " rotation:  0   Forward flexion:  150   Internal rotation 0 degrees:  Lumbar     Muscle Strength   Abduction: 3/5   External rotation: 4/5     Other   Erythema: absent  Sensation: normal  Pulse: present    Comments:    Decreased cervical ROM in all planes.             Physical Exam  Vitals reviewed.   Constitutional:       Appearance: She is well-developed.     Eyes:      Pupils: Pupils are equal, round, and reactive to light.     Pulmonary:      Effort: Pulmonary effort is normal.      Breath sounds: Normal breath sounds.   Abdominal:      General: Abdomen is flat. There is no distension.     Skin:     General: Skin is warm and dry.     Neurological:      Mental Status: She is alert and oriented to person, place, and time.     Psychiatric:         Behavior: Behavior normal.         Thought Content: Thought content normal.         Judgment: Judgment normal.         Large joint arthrocentesis: R glenohumeral    Performed by: Arsen Devine MD  Authorized by: Arsen Devine MD    Tracy Protocol:  procedure performed by consultantConsent: Verbal consent obtained  Consent given by: parent  Patient understanding: patient states understanding of the procedure being performed  Site marked: the operative site was marked  Patient identity confirmed: verbally with patient  Supporting Documentation  Indications: pain     Is this a Visco injection? NoProcedure Details  Location: shoulder - R glenohumeral  Preparation: Patient was prepped and draped in the usual sterile fashion  Needle size: 22 G  Ultrasound guidance: no  Approach: lateral  Medications administered: 2 mL bupivacaine 0.25 %; 6 mg betamethasone acetate-betamethasone sodium phosphate 6 (3-3) mg/mL    Patient tolerance: patient tolerated the procedure well with no immediate complications  Dressing:  Sterile dressing applied         I have personally reviewed pertinent films in PACS and my interpretation is as follows.    MRI R shoulder shows a  chronic, retracted supraspinatus and infraspinatus tendon tear with moderate atrophy of both the supraspinatus and infraspinatus.       Records Reviewed: prior office notes     Scribe Attestation      I,:  Rosy Sawant MA am acting as a scribe while in the presence of the attending physician.:       I,:  Arsen Devine MD personally performed the services described in this documentation    as scribed in my presence.:

## 2025-06-11 ENCOUNTER — TELEPHONE (OUTPATIENT)
Age: 85
End: 2025-06-11

## 2025-06-11 NOTE — TELEPHONE ENCOUNTER
Spoke to pharmacy pt medication needs prior auth. Spoke to patient made aware sent message to prior auth team.

## 2025-06-11 NOTE — TELEPHONE ENCOUNTER
Patient called questioning her Lidocaine gel order.  [facility] pharmacy told her they tried to reach our office but provider was not responding.  Spoke to office and they will contact pharmacy.  Please advise patient of status of this when resolved.  Thank you.

## 2025-06-12 ENCOUNTER — TELEPHONE (OUTPATIENT)
Dept: INTERNAL MEDICINE CLINIC | Facility: CLINIC | Age: 85
End: 2025-06-12

## 2025-06-12 NOTE — TELEPHONE ENCOUNTER
PA for     lidocaine (XYLOCAINE) 2 % topical gel   DENIED    Reason:(Screenshot if applicable)      Message sent to office clinical pool Yes    Denial letter scanned into Media Yes    We can gladly do an appeal but the process can take about 30-60 days to provide determination. Please have the office staff schedule a Peer to Peer at phone [phone]. If an appeal is truly warranted please have Provider send clinical documentation to the PA department to support the appeal.     **Please follow up with your patient regarding denial and next steps**

## 2025-06-12 NOTE — TELEPHONE ENCOUNTER
PA for     lidocaine (XYLOCAINE) 2 % topical gel   SUBMITTED to [employer]    via    [x]CMM-KEY: [id number]  []Surescripts-Case ID #    []Availity-Auth ID #  NDC #    []Faxed to plan   []Other website    []Phone call Case ID #      [x]PA sent as URGENT    All office notes, labs and other pertaining documents and studies sent. Clinical questions answered. Awaiting determination from insurance company.     Turnaround time for your insurance to make a decision on your Prior Authorization can take 7-21 business days.

## 2025-06-12 NOTE — TELEPHONE ENCOUNTER
Patient needs a prior authorization for:      Lidocaine (XYLOCAINE) 2 % topical gel     Not the patches. Please start PA process.

## 2025-06-24 ENCOUNTER — EVALUATION (OUTPATIENT)
Dept: PHYSICAL THERAPY | Facility: CLINIC | Age: 85
End: 2025-06-24
Attending: INTERNAL MEDICINE
Payer: COMMERCIAL

## 2025-06-24 DIAGNOSIS — M12.811 ROTATOR CUFF ARTHROPATHY, RIGHT: Primary | ICD-10-CM

## 2025-06-24 DIAGNOSIS — M75.01 ADHESIVE CAPSULITIS OF RIGHT SHOULDER: ICD-10-CM

## 2025-06-24 PROCEDURE — 97535 SELF CARE MNGMENT TRAINING: CPT | Performed by: PHYSICAL THERAPIST

## 2025-06-24 PROCEDURE — 97110 THERAPEUTIC EXERCISES: CPT | Performed by: PHYSICAL THERAPIST

## 2025-06-24 PROCEDURE — 97161 PT EVAL LOW COMPLEX 20 MIN: CPT | Performed by: PHYSICAL THERAPIST

## 2025-06-24 NOTE — HOME EXERCISE EDUCATION
Program_ID:[id number]   Access Code: [email or web address]  URL: [email or web address]  Date: 06-  Prepared By: Rui Lee    Program Notes      Exercises      - Seated Shoulder Flexion Towel Slide at Table Top - 1 x daily - 7 x weekly - 3 sets - 10 reps      - Shoulder Flexion Overhead with Dowel - 1 x daily - 7 x weekly - 1 sets - 5 reps

## 2025-06-24 NOTE — PROGRESS NOTES
PT Evaluation     Today's date: 2025  Patient name: Peg Garrett  : 1940  MRN: [id number]  Referring provider: Daniel Loo DO  Dx:   Encounter Diagnosis     ICD-10-CM    1. Rotator cuff arthropathy, right  M12.811       2. Adhesive capsulitis of right shoulder  M75.01 Ambulatory Referral to Physical Therapy                     Assessment  Impairments: abnormal or restricted ROM, abnormal movement, activity intolerance, impaired physical strength, lacks appropriate home exercise program, pain with function, poor posture , participation limitations and activity limitations    Assessment details: Pt presents with s/s consistent with a R RTC tear.  Pt will benefit from PT to address impairments and restore function.    Goals  ST-4 weeks.  1.  Pt will decrease pain > 25%.  2.  Pt will increase AROM sh flex > 80 deg.    LT-8 weeks.  1.  Pt will decrease pain > 50%.  2.  Pt will be able to brush her hair with < 3/10 pain.    Plan    Planned therapy interventions: manual therapy, neuromuscular re-education, self care, strengthening, stretching, therapeutic activities, therapeutic exercise, functional ROM exercises, flexibility and home exercise program    Frequency: 2x week  Duration in weeks: 8        Subjective Evaluation    History of Present Illness  Mechanism of injury: Pt is a 84 yof c/o R anterior shoulder pain that began in Oct 2023 after falling onto her shoulder.  Pt reports pain is intermittent and improved.  Pt reports greatest impairment is fixing her hair, eating and reports having no strength into flex.  Patient Goals  Patient goals for therapy: decreased edema, decreased pain, increased motion, increased strength and independence with ADLs/IADLs    Pain  Current pain ratin  At best pain ratin  At worst pain ratin  Quality: dull ache and sharp  Relieving factors: rest  Aggravating factors: lifting and overhead activity (elevation into flexion)      Diagnostic  "Tests  X-ray: abnormal (10/28/23 mild GH & AC OA.)  Treatments  Previous treatment: injection treatment (6/5/25 improved)    Objective     Active Range of Motion     Right Shoulder   Flexion: 30 degrees with pain  Abduction: 110 degrees with pain  External rotation 0°: 5 degrees with pain  Internal rotation BTB: L3 with pain    Passive Range of Motion     Right Shoulder   Flexion: 90 degrees with pain  Abduction: 120 degrees with pain  External rotation 0°: 12 degrees with pain    Strength/Myotome Testing     Right Shoulder     Planes of Motion   Flexion: 3-   Abduction: 3+   External rotation at 0°: 2+     Additional Strength Details  Rep R sh ext pt OP: 3x5 : 20 increased flex ROM, no change in pain.           Precautions: LATEX ALLERGY, A-fib, HTN, Meniere's disease, PTSD, Skin CA, c/s stenosis.    Dx: R RTC tear.    Manuals 6/24            R  PROM                                                    Neuro Re-Ed                                                                                                        Ther Ex             AA/AROM con/ ecc sh flex 1x5 1x5           Flex table slides 1x10 2x10           ER ISO  5\"x10           Sh flex ISO  5\"x10           AAROM ER  10\"x10           Tb rows  Y/ 3x10                                     Ther Activity             Functional reaching with elbow bent  1x5                        Gait Training                                       Modalities                                            "

## 2025-06-26 ENCOUNTER — APPOINTMENT (OUTPATIENT)
Dept: PHYSICAL THERAPY | Facility: CLINIC | Age: 85
End: 2025-06-26
Attending: INTERNAL MEDICINE
Payer: COMMERCIAL

## 2025-07-01 ENCOUNTER — OFFICE VISIT (OUTPATIENT)
Dept: PHYSICAL THERAPY | Facility: CLINIC | Age: 85
End: 2025-07-01
Attending: INTERNAL MEDICINE
Payer: COMMERCIAL

## 2025-07-01 DIAGNOSIS — M75.01 ADHESIVE CAPSULITIS OF RIGHT SHOULDER: Primary | ICD-10-CM

## 2025-07-01 DIAGNOSIS — M12.811 ROTATOR CUFF ARTHROPATHY, RIGHT: ICD-10-CM

## 2025-07-01 PROCEDURE — 97140 MANUAL THERAPY 1/> REGIONS: CPT | Performed by: PHYSICAL THERAPIST

## 2025-07-01 PROCEDURE — 97110 THERAPEUTIC EXERCISES: CPT | Performed by: PHYSICAL THERAPIST

## 2025-07-01 NOTE — HOME EXERCISE EDUCATION
Program_ID:[id number]   Access Code: [email or web address]  URL: [email or web address]  Date: 07-  Prepared By: Rui Lee    Program Notes      Exercises      - Seated Shoulder Flexion Towel Slide at Table Top - 1 x daily - 7 x weekly - 3 sets - 10 reps      - Shoulder Flexion Overhead with Dowel - 1 x daily - 7 x weekly - 1 sets - 5 reps      - Standing Shoulder External Rotation AAROM with Dowel - 1 x daily - 7 x weekly - 1 sets - 5 reps - 10 sec hold

## 2025-07-01 NOTE — PROGRESS NOTES
"Daily Note     Today's date: 2025  Patient name: Peg Garrett  : 1940  MRN: [id number]  Referring provider: Daniel Loo DO  Dx:   Encounter Diagnosis     ICD-10-CM    1. Adhesive capsulitis of right shoulder  M75.01       2. Rotator cuff arthropathy, right  M12.811                      Subjective: Pt reports a slight achiness in her R shoulder during and shortly after her HEP, no pain currently.    Objective: See treatment diary below    Assessment: Pt demonstrated AROM as follows: flex: 90 deg, ABD: 110 deg, ER: 0, FIR: T7.  Reviewed HEP added ER AAROM.    Plan: Continue per plan of care.      Precautions: LATEX ALLERGY, A-fib, HTN, Meniere's disease, PTSD, Skin CA, c/s stenosis.    Dx: R RTC tear.    Manuals            R sh PROM  5 min                                                  Neuro Re-Ed                                                                                                        Ther Ex             AA/AROM con/ ecc sh flex 1x5 1x10           Flex table slides 1x10 3x10           ER ISO             Sh flex ISO             AAROM ER  10\"x10           Tb rows                                       Ther Activity             Functional reaching with elbow bent  Cones/ 1x5                        Gait Training                                       Modalities                                            "

## 2025-07-03 ENCOUNTER — OFFICE VISIT (OUTPATIENT)
Dept: PHYSICAL THERAPY | Facility: CLINIC | Age: 85
End: 2025-07-03
Attending: INTERNAL MEDICINE
Payer: COMMERCIAL

## 2025-07-03 DIAGNOSIS — M75.01 ADHESIVE CAPSULITIS OF RIGHT SHOULDER: Primary | ICD-10-CM

## 2025-07-03 DIAGNOSIS — M12.811 ROTATOR CUFF ARTHROPATHY, RIGHT: ICD-10-CM

## 2025-07-03 PROCEDURE — 97110 THERAPEUTIC EXERCISES: CPT | Performed by: PHYSICAL THERAPIST

## 2025-07-03 PROCEDURE — 97530 THERAPEUTIC ACTIVITIES: CPT | Performed by: PHYSICAL THERAPIST

## 2025-07-03 NOTE — PROGRESS NOTES
"Daily Note     Today's date: 7/3/2025  Patient name: Peg Garrett  : 1940  MRN: [id number]  Referring provider: Daniel Loo DO  Dx:   Encounter Diagnosis     ICD-10-CM    1. Adhesive capsulitis of right shoulder  M75.01       2. Rotator cuff arthropathy, right  M12.811                      Subjective: Pt reports significantly improved reaching tolerance, but still has weakness.    Objective: See treatment diary below    Assessment: Pt demonstrated improved AROM as follows: flex: 125 deg, ABD: 125 deg, ER: 0, FIR: T7.  Added ER iso.    Plan: Continue per plan of care.      Precautions: LATEX ALLERGY, A-fib, HTN, Meniere's disease, PTSD, Skin CA, c/s stenosis.    Dx: R RTC tear.    Manuals 6/24 7/1 7/3          R sh PROM  5 min 5 min                                                 Neuro Re-Ed                                                                                                        Ther Ex             AA/AROM con/ ecc sh flex 1x5 1x10 1x10          Flex table slides 1x10 3x10 3x10          ER ISO   5\"x10          Sh flex ISO             AAROM ER  10\"x10 10\"x10          Tb rows                                       Ther Activity             Functional reaching with elbow bent  Cones/ 1x5 Cones1#/  1x8                       Gait Training                                       Modalities                                            "

## 2025-07-08 ENCOUNTER — OFFICE VISIT (OUTPATIENT)
Dept: PHYSICAL THERAPY | Facility: CLINIC | Age: 85
End: 2025-07-08
Attending: INTERNAL MEDICINE
Payer: COMMERCIAL

## 2025-07-08 DIAGNOSIS — M75.01 ADHESIVE CAPSULITIS OF RIGHT SHOULDER: Primary | ICD-10-CM

## 2025-07-08 DIAGNOSIS — M12.811 ROTATOR CUFF ARTHROPATHY, RIGHT: ICD-10-CM

## 2025-07-08 PROCEDURE — 97530 THERAPEUTIC ACTIVITIES: CPT | Performed by: PHYSICAL THERAPIST

## 2025-07-08 PROCEDURE — 97110 THERAPEUTIC EXERCISES: CPT | Performed by: PHYSICAL THERAPIST

## 2025-07-08 NOTE — PROGRESS NOTES
"Daily Note     Today's date: 2025  Patient name: Peg Garrett  : 1940  MRN: [id number]  Referring provider: Daniel Loo DO  Dx:   Encounter Diagnosis     ICD-10-CM    1. Adhesive capsulitis of right shoulder  M75.01       2. Rotator cuff arthropathy, right  M12.811                      Subjective: Pt reports significantly improved reaching tolerance, but still has weakness.    Objective: See treatment diary below    Assessment: Pt demonstrated improved AROM as follows: flex: 125 deg, ABD: 125 deg, ER:50, FIR: T7.      Plan: Continue per plan of care.      Precautions: LATEX ALLERGY, A-fib, HTN, Meniere's disease, PTSD, Skin CA, c/s stenosis.    Dx: R RTC tear.    Manuals 6/24 7/1 7/3 7/8         R sh PROM  5 min 5 min 5 min                                                Neuro Re-Ed                                                                                                        Ther Ex             AA/AROM con/ ecc sh flex 1x5 1x10 1x10 1x10         Flex table slides 1x10 3x10 3x10 Incline 45 deg/ 3x10         ER ISO   5\"x10 5\"x10         Sh flex ISO             AAROM ER  10\"x10 10\"x10 10\"x12         Tb rows             Full AROM flex, ABD                          Ther Activity             Functional reaching with elbow bent  Cones/ 1x5 Cones1#/  1x8 Cones 1#/ 2x8                      Gait Training                                       Modalities                                            "

## 2025-07-10 ENCOUNTER — OFFICE VISIT (OUTPATIENT)
Dept: PHYSICAL THERAPY | Facility: CLINIC | Age: 85
End: 2025-07-10
Attending: INTERNAL MEDICINE
Payer: COMMERCIAL

## 2025-07-10 DIAGNOSIS — M12.811 ROTATOR CUFF ARTHROPATHY, RIGHT: ICD-10-CM

## 2025-07-10 DIAGNOSIS — M75.01 ADHESIVE CAPSULITIS OF RIGHT SHOULDER: Primary | ICD-10-CM

## 2025-07-10 PROCEDURE — 97110 THERAPEUTIC EXERCISES: CPT | Performed by: PHYSICAL THERAPIST

## 2025-07-10 PROCEDURE — 97530 THERAPEUTIC ACTIVITIES: CPT | Performed by: PHYSICAL THERAPIST

## 2025-07-10 NOTE — PROGRESS NOTES
"Daily Note     Today's date: 7/10/2025  Patient name: Peg Garrett  : 1940  MRN: [id number]  Referring provider: Daniel Loo DO  Dx:   Encounter Diagnosis     ICD-10-CM    1. Adhesive capsulitis of right shoulder  M75.01       2. Rotator cuff arthropathy, right  M12.811                      Subjective: Pt reports significantly improved reaching tolerance, but still has weakness.    Objective: See treatment diary below    Assessment: Pt demonstrated improved AROM as follows: flex: 130 deg, ABD: 125 deg, ER: 0, FIR: T7.  Pt is unable to hold ER actively.    Plan: Continue per plan of care.      Precautions: LATEX ALLERGY, A-fib, HTN, Meniere's disease, PTSD, Skin CA, c/s stenosis.    Dx: R RTC tear.    Manuals 6/24 7/1 7/3 7/8 7/10        R sh PROM  5 min 5 min 5 min 5 min                                               Neuro Re-Ed                                                                                                        Ther Ex             AA/AROM con/ ecc sh flex 1x5 1x10 1x10 1x10 1x10        Flex table slides 1x10 3x10 3x10 Incline 45 deg/ 3x10 45 deg/ 3x10        ER ISO   5\"x10 5\"x10         Sh flex ISO             AAROM ER  10\"x10 10\"x10 10\"x12 10\"x15        Tb rows             Full AROM flex, ABD             AA/AROM ER     1x10        AROM ER     1x10        Ther Activity             Functional reaching with elbow bent  Cones/ 1x5 Cones1#/  1x8 Cones 1#/ 2x8 1#/ 2x10                     Gait Training                                       Modalities                                            "

## 2025-07-14 ENCOUNTER — OFFICE VISIT (OUTPATIENT)
Dept: PHYSICAL THERAPY | Facility: CLINIC | Age: 85
End: 2025-07-14
Attending: INTERNAL MEDICINE
Payer: COMMERCIAL

## 2025-07-14 DIAGNOSIS — E03.9 HYPOTHYROIDISM, UNSPECIFIED TYPE: ICD-10-CM

## 2025-07-14 DIAGNOSIS — M12.811 ROTATOR CUFF ARTHROPATHY, RIGHT: Primary | ICD-10-CM

## 2025-07-14 PROCEDURE — 97530 THERAPEUTIC ACTIVITIES: CPT | Performed by: PHYSICAL THERAPIST

## 2025-07-14 PROCEDURE — 97110 THERAPEUTIC EXERCISES: CPT | Performed by: PHYSICAL THERAPIST

## 2025-07-14 NOTE — PROGRESS NOTES
"Daily Note     Today's date: 2025  Patient name: Peg Garrett  : 1940  MRN: [id number]  Referring provider: Daniel Loo DO  Dx:   Encounter Diagnosis     ICD-10-CM    1. Rotator cuff arthropathy, right  M12.811                      Subjective: Pt reports intermittent R sh achiness at rest or after lifting.    Objective: See treatment diary below    Assessment: Pt demonstrated improved AROM as follows: flex: 130 deg, ABD: 125 deg, ER: 3, FIR: T7.      Plan: Continue per plan of care.      Precautions: LATEX ALLERGY, A-fib, HTN, Meniere's disease, PTSD, Skin CA, c/s stenosis.    Dx: R RTC tear.    Manuals 6/24 7/1 7/3 7/8 7/10 7/14       R sh PROM  5 min 5 min 5 min 5 min 5 min                                              Neuro Re-Ed                                                                                                        Ther Ex             AA/AROM con/ ecc sh flex 1x5 1x10 1x10 1x10 1x10 1#/ 1x10       Flex table slides 1x10 3x10 3x10 Incline 45 deg/ 3x10 45 deg/ 3x10 45 deg/ 3x10       ER ISO   5\"x10 5\"x10  5\"x10       Sh flex ISO             AAROM ER  10\"x10 10\"x10 10\"x12 10\"x15 10\"x15       Tb rows             Full AROM flex, ABD             AA/AROM ER     1x10 1#/ 1x10       AROM ER     1x10        Ther Activity             Functional reaching with elbow bent  Cones/ 1x5 Cones1#/  1x8 Cones 1#/ 2x8 1#/ 2x10 1#/ 3x8                    Gait Training                                       Modalities                                            "

## 2025-07-15 RX ORDER — LEVOTHYROXINE SODIUM 88 UG/1
88 TABLET ORAL DAILY
Qty: 90 TABLET | Refills: 1 | Status: SHIPPED | OUTPATIENT
Start: 2025-07-15

## 2025-07-16 ENCOUNTER — OFFICE VISIT (OUTPATIENT)
Dept: PHYSICAL THERAPY | Facility: CLINIC | Age: 85
End: 2025-07-16
Attending: INTERNAL MEDICINE
Payer: COMMERCIAL

## 2025-07-16 DIAGNOSIS — M75.01 ADHESIVE CAPSULITIS OF RIGHT SHOULDER: ICD-10-CM

## 2025-07-16 DIAGNOSIS — M12.811 ROTATOR CUFF ARTHROPATHY, RIGHT: Primary | ICD-10-CM

## 2025-07-16 PROCEDURE — 97140 MANUAL THERAPY 1/> REGIONS: CPT | Performed by: PHYSICAL THERAPIST

## 2025-07-16 PROCEDURE — 97110 THERAPEUTIC EXERCISES: CPT | Performed by: PHYSICAL THERAPIST

## 2025-07-16 NOTE — HOME EXERCISE EDUCATION
Program_ID:[id number]   Access Code: [email or web address]  URL: [email or web address]  Date: 07-  Prepared By: Rui Lee    Program Notes      Exercises      - Seated Shoulder Flexion Towel Slide at Table Top - 1 x daily - 7 x weekly - 3 sets - 10 reps      - Shoulder Flexion Overhead with Dowel - 1 x daily - 7 x weekly - 2 sets - 10 reps      - Standing Shoulder External Rotation AAROM with Dowel - 1 x daily - 7 x weekly - 2 sets - 10 reps

## 2025-07-16 NOTE — PROGRESS NOTES
"Daily Note     Today's date: 2025  Patient name: Peg Garrett  : 1940  MRN: [id number]  Referring provider: Daniel Loo DO  Dx:   Encounter Diagnosis     ICD-10-CM    1. Rotator cuff arthropathy, right  M12.811       2. Adhesive capsulitis of right shoulder  M75.01                      Subjective: Pt reports increased R shoulder pain after lifting overhead.    Objective: See treatment diary below    Assessment: Pt demonstrated improved AROM as follows: flex: 130 deg, ABD: 125 deg, ER: 3, FIR: T7.  Decreased intensity, until pain returns to baseline.      Plan: Continue per plan of care.      Precautions: LATEX ALLERGY, A-fib, HTN, Meniere's disease, PTSD, Skin CA, c/s stenosis.    Dx: R RTC tear.    Manuals 6/24 7/1 7/3 7/8 7/10 7/14 7/16      R sh PROM  5 min 5 min 5 min 5 min 5 min 10 min                                             Neuro Re-Ed                                                                                                        Ther Ex             AA/AROM con/ ecc sh flex 1x5 1x10 1x10 1x10 1x10 1#/ 1x10 2x10      Flex table slides 1x10 3x10 3x10 Incline 45 deg/ 3x10 45 deg/ 3x10 45 deg/ 3x10       Table slides       1x10      wallslides       1x10      ER ISO   5\"x10 5\"x10  5\"x10       Sh flex ISO             AAROM ER  10\"x10 10\"x10 10\"x12 10\"x15 10\"x15       Tb rows             Full AROM flex, ABD             AA/AROM ER     1x10 1#/ 1x10 1x10      AROM ER     1x10        Ther Activity             Functional reaching with elbow bent  Cones/ 1x5 Cones1#/  1x8 Cones 1#/ 2x8 1#/ 2x10 1#/ 3x8 hold                   Gait Training                                       Modalities                                            "

## 2025-07-21 ENCOUNTER — OFFICE VISIT (OUTPATIENT)
Dept: PHYSICAL THERAPY | Facility: CLINIC | Age: 85
End: 2025-07-21
Attending: INTERNAL MEDICINE
Payer: COMMERCIAL

## 2025-07-21 DIAGNOSIS — M12.811 ROTATOR CUFF ARTHROPATHY, RIGHT: Primary | ICD-10-CM

## 2025-07-21 DIAGNOSIS — M75.01 ADHESIVE CAPSULITIS OF RIGHT SHOULDER: ICD-10-CM

## 2025-07-21 PROCEDURE — 97110 THERAPEUTIC EXERCISES: CPT | Performed by: PHYSICAL THERAPIST

## 2025-07-21 NOTE — PROGRESS NOTES
"Daily Note     Today's date: 2025  Patient name: Peg Garrett  : 1940  MRN: [id number]  Referring provider: Daniel Loo DO  Dx:   Encounter Diagnosis     ICD-10-CM    1. Rotator cuff arthropathy, right  M12.811       2. Adhesive capsulitis of right shoulder  M75.01                      Subjective: Pt reports a return to baseline pain.  Unable to brush her hair.      Objective: See treatment diary below    Assessment: Pt demonstrated improved AROM as follows: flex: 130 deg, ABD: 125 deg, ER: 3, FIR: T7.      Plan: Continue per plan of care. Progress resisted OH lifting as tolerated.     Precautions: LATEX ALLERGY, A-fib, HTN, Meniere's disease, PTSD, Skin CA, c/s stenosis.    Dx: R RTC tear.    Manuals 6/24 7/1 7/3 7/8 7/10 7/14 7/16 7/21     R sh PROM  5 min 5 min 5 min 5 min 5 min 10 min 10 min                                            Neuro Re-Ed                                                                                                        Ther Ex             AA/AROM con/ ecc sh flex 1x5 1x10 1x10 1x10 1x10 1#/ 1x10 2x10 1#/ 2x10     Flex table slides 1x10 3x10 3x10 Incline 45 deg/ 3x10 45 deg/ 3x10 45 deg/ 3x10       Table slides       1x10      wallslides       1x10 2x10     ER ISO   5\"x10 5\"x10  5\"x10  5\"x10     Sh flex ISO             AAROM ER  10\"x10 10\"x10 10\"x12 10\"x15 10\"x15       Tb rows             Full AROM flex, ABD             AA/AROM ER     1x10 1#/ 1x10 1x10 2x10     AROM ER     1x10        Ther Activity             Functional reaching with elbow bent  Cones/ 1x5 Cones1#/  1x8 Cones 1#/ 2x8 1#/ 2x10 1#/ 3x8 hold 1#/ 2x6                  Gait Training                                       Modalities                                            "

## 2025-07-29 ENCOUNTER — EVALUATION (OUTPATIENT)
Dept: PHYSICAL THERAPY | Facility: CLINIC | Age: 85
End: 2025-07-29
Attending: INTERNAL MEDICINE
Payer: COMMERCIAL

## 2025-07-29 DIAGNOSIS — M75.01 ADHESIVE CAPSULITIS OF RIGHT SHOULDER: ICD-10-CM

## 2025-07-29 DIAGNOSIS — M12.811 ROTATOR CUFF ARTHROPATHY, RIGHT: Primary | ICD-10-CM

## 2025-07-29 PROCEDURE — 97110 THERAPEUTIC EXERCISES: CPT | Performed by: PHYSICAL THERAPIST

## 2025-07-31 ENCOUNTER — APPOINTMENT (OUTPATIENT)
Dept: PHYSICAL THERAPY | Facility: CLINIC | Age: 85
End: 2025-07-31
Attending: INTERNAL MEDICINE
Payer: COMMERCIAL